# Patient Record
Sex: MALE | Race: WHITE | Employment: OTHER | URBAN - METROPOLITAN AREA
[De-identification: names, ages, dates, MRNs, and addresses within clinical notes are randomized per-mention and may not be internally consistent; named-entity substitution may affect disease eponyms.]

---

## 2017-03-23 PROBLEM — I47.29 NSVT (NONSUSTAINED VENTRICULAR TACHYCARDIA): Status: ACTIVE | Noted: 2017-03-23

## 2017-03-23 PROBLEM — Z86.718 HX OF DEEP VENOUS THROMBOSIS: Status: ACTIVE | Noted: 2017-03-23

## 2017-06-21 ENCOUNTER — TELEPHONE (OUTPATIENT)
Dept: INTERNAL MEDICINE | Age: 67
End: 2017-06-21

## 2021-04-10 ENCOUNTER — HOSPITAL ENCOUNTER (INPATIENT)
Age: 71
LOS: 9 days | Discharge: HOME | DRG: 190 | End: 2021-04-19
Attending: EMERGENCY MEDICINE | Admitting: HOSPITALIST
Payer: OTHER GOVERNMENT

## 2021-04-10 ENCOUNTER — APPOINTMENT (OUTPATIENT)
Dept: CT IMAGING | Age: 71
DRG: 190 | End: 2021-04-10
Payer: OTHER GOVERNMENT

## 2021-04-10 ENCOUNTER — APPOINTMENT (OUTPATIENT)
Dept: GENERAL RADIOLOGY | Age: 71
DRG: 190 | End: 2021-04-10
Payer: OTHER GOVERNMENT

## 2021-04-10 DIAGNOSIS — R06.02 SHORTNESS OF BREATH: Primary | ICD-10-CM

## 2021-04-10 PROBLEM — Z86.711 HISTORY OF PULMONARY EMBOLUS (PE): Status: ACTIVE | Noted: 2021-04-10

## 2021-04-10 PROBLEM — I50.9 DECOMPENSATED HEART FAILURE (HCC): Status: ACTIVE | Noted: 2021-04-10

## 2021-04-10 PROBLEM — J44.1 COPD EXACERBATION (HCC): Status: ACTIVE | Noted: 2021-04-10

## 2021-04-10 PROBLEM — R79.1 SUBTHERAPEUTIC INTERNATIONAL NORMALIZED RATIO (INR): Status: ACTIVE | Noted: 2021-04-10

## 2021-04-10 LAB
A/G RATIO: 1.1 (ref 1.1–2.2)
ALBUMIN SERPL-MCNC: 4 G/DL (ref 3.4–5)
ALP BLD-CCNC: 97 U/L (ref 40–129)
ALT SERPL-CCNC: 13 U/L (ref 10–40)
ANION GAP SERPL CALCULATED.3IONS-SCNC: 8 MMOL/L (ref 3–16)
APTT: 38.7 SEC (ref 24.2–36.2)
AST SERPL-CCNC: 13 U/L (ref 15–37)
BASOPHILS ABSOLUTE: 0.1 K/UL (ref 0–0.2)
BASOPHILS RELATIVE PERCENT: 0.7 %
BILIRUB SERPL-MCNC: 0.7 MG/DL (ref 0–1)
BUN BLDV-MCNC: 15 MG/DL (ref 7–20)
CALCIUM SERPL-MCNC: 9.7 MG/DL (ref 8.3–10.6)
CHLORIDE BLD-SCNC: 100 MMOL/L (ref 99–110)
CO2: 30 MMOL/L (ref 21–32)
CREAT SERPL-MCNC: 1.2 MG/DL (ref 0.8–1.3)
D DIMER: <200 NG/ML DDU (ref 0–229)
EKG ATRIAL RATE: 94 BPM
EKG DIAGNOSIS: NORMAL
EKG P AXIS: 85 DEGREES
EKG P-R INTERVAL: 130 MS
EKG Q-T INTERVAL: 408 MS
EKG QRS DURATION: 76 MS
EKG QTC CALCULATION (BAZETT): 510 MS
EKG R AXIS: 80 DEGREES
EKG T AXIS: 78 DEGREES
EKG VENTRICULAR RATE: 94 BPM
EOSINOPHILS ABSOLUTE: 0.4 K/UL (ref 0–0.6)
EOSINOPHILS RELATIVE PERCENT: 3.6 %
GFR AFRICAN AMERICAN: >60
GFR NON-AFRICAN AMERICAN: 60
GLOBULIN: 3.5 G/DL
GLUCOSE BLD-MCNC: 116 MG/DL (ref 70–99)
HCT VFR BLD CALC: 45.8 % (ref 40.5–52.5)
HEMOGLOBIN: 15.5 G/DL (ref 13.5–17.5)
INR BLD: 1.41 (ref 0.86–1.14)
LYMPHOCYTES ABSOLUTE: 1.4 K/UL (ref 1–5.1)
LYMPHOCYTES RELATIVE PERCENT: 14.1 %
MCH RBC QN AUTO: 32.9 PG (ref 26–34)
MCHC RBC AUTO-ENTMCNC: 33.8 G/DL (ref 31–36)
MCV RBC AUTO: 97.2 FL (ref 80–100)
MONOCYTES ABSOLUTE: 0.7 K/UL (ref 0–1.3)
MONOCYTES RELATIVE PERCENT: 7.4 %
NEUTROPHILS ABSOLUTE: 7.3 K/UL (ref 1.7–7.7)
NEUTROPHILS RELATIVE PERCENT: 74.2 %
PDW BLD-RTO: 13.2 % (ref 12.4–15.4)
PLATELET # BLD: 221 K/UL (ref 135–450)
PMV BLD AUTO: 8.8 FL (ref 5–10.5)
POTASSIUM REFLEX MAGNESIUM: 4.4 MMOL/L (ref 3.5–5.1)
PRO-BNP: 1062 PG/ML (ref 0–124)
PROTHROMBIN TIME: 16.4 SEC (ref 10–13.2)
RBC # BLD: 4.71 M/UL (ref 4.2–5.9)
SODIUM BLD-SCNC: 138 MMOL/L (ref 136–145)
TOTAL PROTEIN: 7.5 G/DL (ref 6.4–8.2)
TROPONIN: 0.02 NG/ML
TROPONIN: <0.01 NG/ML
WBC # BLD: 9.9 K/UL (ref 4–11)

## 2021-04-10 PROCEDURE — 96375 TX/PRO/DX INJ NEW DRUG ADDON: CPT

## 2021-04-10 PROCEDURE — 94761 N-INVAS EAR/PLS OXIMETRY MLT: CPT

## 2021-04-10 PROCEDURE — 85610 PROTHROMBIN TIME: CPT

## 2021-04-10 PROCEDURE — 6370000000 HC RX 637 (ALT 250 FOR IP): Performed by: HOSPITALIST

## 2021-04-10 PROCEDURE — 84443 ASSAY THYROID STIM HORMONE: CPT

## 2021-04-10 PROCEDURE — 94640 AIRWAY INHALATION TREATMENT: CPT

## 2021-04-10 PROCEDURE — 85025 COMPLETE CBC W/AUTO DIFF WBC: CPT

## 2021-04-10 PROCEDURE — 1200000000 HC SEMI PRIVATE

## 2021-04-10 PROCEDURE — 85379 FIBRIN DEGRADATION QUANT: CPT

## 2021-04-10 PROCEDURE — 96374 THER/PROPH/DIAG INJ IV PUSH: CPT

## 2021-04-10 PROCEDURE — 93005 ELECTROCARDIOGRAM TRACING: CPT | Performed by: EMERGENCY MEDICINE

## 2021-04-10 PROCEDURE — 94150 VITAL CAPACITY TEST: CPT

## 2021-04-10 PROCEDURE — 2700000000 HC OXYGEN THERAPY PER DAY

## 2021-04-10 PROCEDURE — 6370000000 HC RX 637 (ALT 250 FOR IP): Performed by: STUDENT IN AN ORGANIZED HEALTH CARE EDUCATION/TRAINING PROGRAM

## 2021-04-10 PROCEDURE — 85730 THROMBOPLASTIN TIME PARTIAL: CPT

## 2021-04-10 PROCEDURE — 84484 ASSAY OF TROPONIN QUANT: CPT

## 2021-04-10 PROCEDURE — 6360000002 HC RX W HCPCS: Performed by: STUDENT IN AN ORGANIZED HEALTH CARE EDUCATION/TRAINING PROGRAM

## 2021-04-10 PROCEDURE — 71260 CT THORAX DX C+: CPT

## 2021-04-10 PROCEDURE — 6360000004 HC RX CONTRAST MEDICATION: Performed by: HOSPITALIST

## 2021-04-10 PROCEDURE — 6360000002 HC RX W HCPCS: Performed by: HOSPITALIST

## 2021-04-10 PROCEDURE — 36415 COLL VENOUS BLD VENIPUNCTURE: CPT

## 2021-04-10 PROCEDURE — 93010 ELECTROCARDIOGRAM REPORT: CPT | Performed by: INTERNAL MEDICINE

## 2021-04-10 PROCEDURE — 71045 X-RAY EXAM CHEST 1 VIEW: CPT

## 2021-04-10 PROCEDURE — 2580000003 HC RX 258: Performed by: HOSPITALIST

## 2021-04-10 PROCEDURE — 99285 EMERGENCY DEPT VISIT HI MDM: CPT

## 2021-04-10 PROCEDURE — 84439 ASSAY OF FREE THYROXINE: CPT

## 2021-04-10 PROCEDURE — 80053 COMPREHEN METABOLIC PANEL: CPT

## 2021-04-10 PROCEDURE — 83880 ASSAY OF NATRIURETIC PEPTIDE: CPT

## 2021-04-10 RX ORDER — IPRATROPIUM BROMIDE AND ALBUTEROL SULFATE 2.5; .5 MG/3ML; MG/3ML
1 SOLUTION RESPIRATORY (INHALATION)
Status: DISCONTINUED | OUTPATIENT
Start: 2021-04-10 | End: 2021-04-10

## 2021-04-10 RX ORDER — MAGNESIUM SULFATE IN WATER 40 MG/ML
2000 INJECTION, SOLUTION INTRAVENOUS PRN
Status: DISCONTINUED | OUTPATIENT
Start: 2021-04-10 | End: 2021-04-19 | Stop reason: HOSPADM

## 2021-04-10 RX ORDER — POTASSIUM CHLORIDE 7.45 MG/ML
10 INJECTION INTRAVENOUS PRN
Status: DISCONTINUED | OUTPATIENT
Start: 2021-04-10 | End: 2021-04-19 | Stop reason: HOSPADM

## 2021-04-10 RX ORDER — METHYLPREDNISOLONE SODIUM SUCCINATE 125 MG/2ML
60 INJECTION, POWDER, LYOPHILIZED, FOR SOLUTION INTRAMUSCULAR; INTRAVENOUS DAILY
Status: DISCONTINUED | OUTPATIENT
Start: 2021-04-10 | End: 2021-04-10

## 2021-04-10 RX ORDER — FUROSEMIDE 10 MG/ML
40 INJECTION INTRAMUSCULAR; INTRAVENOUS ONCE
Status: COMPLETED | OUTPATIENT
Start: 2021-04-10 | End: 2021-04-10

## 2021-04-10 RX ORDER — FUROSEMIDE 10 MG/ML
40 INJECTION INTRAMUSCULAR; INTRAVENOUS 2 TIMES DAILY
Status: DISCONTINUED | OUTPATIENT
Start: 2021-04-11 | End: 2021-04-10

## 2021-04-10 RX ORDER — ATORVASTATIN CALCIUM 80 MG/1
80 TABLET, FILM COATED ORAL NIGHTLY
Status: DISCONTINUED | OUTPATIENT
Start: 2021-04-10 | End: 2021-04-19 | Stop reason: HOSPADM

## 2021-04-10 RX ORDER — ONDANSETRON 2 MG/ML
4 INJECTION INTRAMUSCULAR; INTRAVENOUS EVERY 6 HOURS PRN
Status: DISCONTINUED | OUTPATIENT
Start: 2021-04-10 | End: 2021-04-10

## 2021-04-10 RX ORDER — METHYLPREDNISOLONE SODIUM SUCCINATE 125 MG/2ML
60 INJECTION, POWDER, LYOPHILIZED, FOR SOLUTION INTRAMUSCULAR; INTRAVENOUS EVERY 12 HOURS
Status: DISCONTINUED | OUTPATIENT
Start: 2021-04-10 | End: 2021-04-16

## 2021-04-10 RX ORDER — POTASSIUM CHLORIDE 20 MEQ/1
40 TABLET, EXTENDED RELEASE ORAL PRN
Status: DISCONTINUED | OUTPATIENT
Start: 2021-04-10 | End: 2021-04-19 | Stop reason: HOSPADM

## 2021-04-10 RX ORDER — ACETAMINOPHEN 325 MG/1
650 TABLET ORAL EVERY 6 HOURS PRN
Status: DISCONTINUED | OUTPATIENT
Start: 2021-04-10 | End: 2021-04-19 | Stop reason: HOSPADM

## 2021-04-10 RX ORDER — ATORVASTATIN CALCIUM 80 MG/1
80 TABLET, FILM COATED ORAL NIGHTLY
COMMUNITY

## 2021-04-10 RX ORDER — ACETAMINOPHEN 650 MG/1
650 SUPPOSITORY RECTAL EVERY 6 HOURS PRN
Status: DISCONTINUED | OUTPATIENT
Start: 2021-04-10 | End: 2021-04-19 | Stop reason: HOSPADM

## 2021-04-10 RX ORDER — IPRATROPIUM BROMIDE AND ALBUTEROL SULFATE 2.5; .5 MG/3ML; MG/3ML
1 SOLUTION RESPIRATORY (INHALATION) EVERY 4 HOURS
Status: DISCONTINUED | OUTPATIENT
Start: 2021-04-10 | End: 2021-04-10

## 2021-04-10 RX ORDER — PROMETHAZINE HYDROCHLORIDE 25 MG/1
12.5 TABLET ORAL EVERY 6 HOURS PRN
Status: DISCONTINUED | OUTPATIENT
Start: 2021-04-10 | End: 2021-04-19 | Stop reason: HOSPADM

## 2021-04-10 RX ORDER — IPRATROPIUM BROMIDE AND ALBUTEROL SULFATE 2.5; .5 MG/3ML; MG/3ML
1 SOLUTION RESPIRATORY (INHALATION) EVERY 4 HOURS PRN
Status: DISCONTINUED | OUTPATIENT
Start: 2021-04-10 | End: 2021-04-19 | Stop reason: HOSPADM

## 2021-04-10 RX ORDER — FUROSEMIDE 10 MG/ML
20 INJECTION INTRAMUSCULAR; INTRAVENOUS 2 TIMES DAILY
Status: DISCONTINUED | OUTPATIENT
Start: 2021-04-11 | End: 2021-04-11

## 2021-04-10 RX ORDER — SODIUM CHLORIDE 0.9 % (FLUSH) 0.9 %
10 SYRINGE (ML) INJECTION PRN
Status: DISCONTINUED | OUTPATIENT
Start: 2021-04-10 | End: 2021-04-19 | Stop reason: HOSPADM

## 2021-04-10 RX ORDER — ASPIRIN 325 MG
325 TABLET ORAL ONCE
Status: COMPLETED | OUTPATIENT
Start: 2021-04-10 | End: 2021-04-10

## 2021-04-10 RX ORDER — SODIUM CHLORIDE 0.9 % (FLUSH) 0.9 %
10 SYRINGE (ML) INJECTION EVERY 12 HOURS SCHEDULED
Status: DISCONTINUED | OUTPATIENT
Start: 2021-04-10 | End: 2021-04-19 | Stop reason: HOSPADM

## 2021-04-10 RX ORDER — PANTOPRAZOLE SODIUM 40 MG/1
40 TABLET, DELAYED RELEASE ORAL
Status: DISCONTINUED | OUTPATIENT
Start: 2021-04-11 | End: 2021-04-19 | Stop reason: HOSPADM

## 2021-04-10 RX ORDER — SENNA PLUS 8.6 MG/1
1 TABLET ORAL DAILY PRN
Status: DISCONTINUED | OUTPATIENT
Start: 2021-04-10 | End: 2021-04-19 | Stop reason: HOSPADM

## 2021-04-10 RX ORDER — LOSARTAN POTASSIUM 25 MG/1
25 TABLET ORAL DAILY
Status: DISCONTINUED | OUTPATIENT
Start: 2021-04-10 | End: 2021-04-12

## 2021-04-10 RX ORDER — IPRATROPIUM BROMIDE AND ALBUTEROL SULFATE 2.5; .5 MG/3ML; MG/3ML
1 SOLUTION RESPIRATORY (INHALATION) EVERY 4 HOURS
Status: DISCONTINUED | OUTPATIENT
Start: 2021-04-11 | End: 2021-04-13

## 2021-04-10 RX ORDER — SODIUM CHLORIDE 9 MG/ML
25 INJECTION, SOLUTION INTRAVENOUS PRN
Status: DISCONTINUED | OUTPATIENT
Start: 2021-04-10 | End: 2021-04-19 | Stop reason: HOSPADM

## 2021-04-10 RX ADMIN — ATORVASTATIN CALCIUM 80 MG: 80 TABLET, FILM COATED ORAL at 22:14

## 2021-04-10 RX ADMIN — IPRATROPIUM BROMIDE AND ALBUTEROL SULFATE 1 AMPULE: .5; 3 SOLUTION RESPIRATORY (INHALATION) at 14:51

## 2021-04-10 RX ADMIN — LOSARTAN POTASSIUM 25 MG: 25 TABLET, FILM COATED ORAL at 19:03

## 2021-04-10 RX ADMIN — IPRATROPIUM BROMIDE AND ALBUTEROL SULFATE 1 AMPULE: .5; 3 SOLUTION RESPIRATORY (INHALATION) at 19:52

## 2021-04-10 RX ADMIN — NITROGLYCERIN 1 INCH: 20 OINTMENT TOPICAL at 22:14

## 2021-04-10 RX ADMIN — METOPROLOL TARTRATE 12.5 MG: 25 TABLET, FILM COATED ORAL at 22:15

## 2021-04-10 RX ADMIN — FUROSEMIDE 40 MG: 10 INJECTION, SOLUTION INTRAMUSCULAR; INTRAVENOUS at 17:07

## 2021-04-10 RX ADMIN — IPRATROPIUM BROMIDE AND ALBUTEROL SULFATE 1 AMPULE: .5; 2.5 SOLUTION RESPIRATORY (INHALATION) at 23:45

## 2021-04-10 RX ADMIN — METHYLPREDNISOLONE SODIUM SUCCINATE 60 MG: 125 INJECTION, POWDER, FOR SOLUTION INTRAMUSCULAR; INTRAVENOUS at 22:12

## 2021-04-10 RX ADMIN — METHYLPREDNISOLONE SODIUM SUCCINATE 60 MG: 125 INJECTION, POWDER, FOR SOLUTION INTRAMUSCULAR; INTRAVENOUS at 15:20

## 2021-04-10 RX ADMIN — ENOXAPARIN SODIUM 90 MG: 100 INJECTION SUBCUTANEOUS at 19:03

## 2021-04-10 RX ADMIN — Medication 10 ML: at 22:14

## 2021-04-10 RX ADMIN — IOPAMIDOL 75 ML: 755 INJECTION, SOLUTION INTRAVENOUS at 17:55

## 2021-04-10 RX ADMIN — ASPIRIN 325 MG: 325 TABLET, FILM COATED ORAL at 15:20

## 2021-04-10 NOTE — ED NOTES
Bed: 14  Expected date:   Expected time:   Means of arrival:   Comments:  [address], RN  04/10/21 1369

## 2021-04-10 NOTE — ED PROVIDER NOTES
Emergency Department Provider Note     Location: [facility]  ED  4/10/2021    [facility] All diagnostic, treatment, and disposition decisions were made by myself in conjunction with the resident. Briefly, this is a 72 y.o. male here for shortness of breath. Patient reports that he got his second dose of Covid vaccine on Sunday. He reports that on Monday he woke up feeling very short of breath. Initially he thought it would just get better and he used his albuterol inhaler more often. Unfortunately symptoms progressed and he felt worse especially on exertion and at nighttime trying to sleep. Today it got so bad he called EMS. Patient reports that he has a history of coronary artery disease but does not have a stent. Reports history of atrial fibrillation and is on anticoagulation. ED Triage Vitals [04/10/21 1358]   BP Temp Temp Source Pulse Resp SpO2 Height Weight   (!) 156/88 98.1 °F (36.7 °C) Oral 96 20 94 % 6' 5\" (1.956 m) 198 lb (89.8 kg)        Patient resting comfortably in no acute distress. Heart is regular rate and rhythm. Lungs with diffuse wheezes throughout with increased work of breathing and crackles at the bases. Abdomen is soft, nondistended, and nontender. No peripheral edema noted. Patient seen and examined. Vital signs stable and within normal limits. Physical exam as documented above. EKG shows concerning ST depressions in leads II, 3, aVF, V4, V5, V6. Depressions in V4, V5, V6 appear new compared to prior EKG dated March 23, 2017. Lab work-up notable for elevated troponin of 0.02, BNP elevated at 1062. Patient given aspirin, DuoNeb treatment, steroids, and Lasix. On reevaluation patient is still symptomatic with wheezes and crackles. Will plan to admit for abnormal EKG, elevated troponin, heart failure exacerbation and COPD exacerbation.     EKG  The Ekg interpreted by me in the absence of a cardiologist shows. EKG shows concerning ST depressions in leads II, 3, aVF, V4, V5, V6. Depressions in V4, V5, V6 appear new compared to prior EKG dated March 23, 2017      Xr Chest Portable    Result Date: 4/10/2021  EXAMINATION: ONE XRAY VIEW OF THE CHEST 4/10/2021 2:05 pm COMPARISON: 03/23/2017 HISTORY: ORDERING SYSTEM PROVIDED HISTORY: shortness of breath TECHNOLOGIST PROVIDED HISTORY: Reason for exam:->shortness of breath Reason for Exam: SOB Acuity: Acute Type of Exam: Initial FINDINGS: There is biapical and bibasilar scarring. The lungs are hyperexpanded. The cardiac silhouette is within normal limits. There is no pneumothorax or pleural effusion. 1.  No acute abnormality.          Results for orders placed or performed during the hospital encounter of 04/10/21   CBC auto differential   Result Value Ref Range    WBC 9.9 4.0 - 11.0 K/uL    RBC 4.71 4.20 - 5.90 M/uL    Hemoglobin 15.5 13.5 - 17.5 g/dL    Hematocrit 45.8 40.5 - 52.5 %    MCV 97.2 80.0 - 100.0 fL    MCH 32.9 26.0 - 34.0 pg    MCHC 33.8 31.0 - 36.0 g/dL    RDW 13.2 12.4 - 15.4 %    Platelets 640 597 - 678 K/uL    MPV 8.8 5.0 - 10.5 fL    Neutrophils % 74.2 %    Lymphocytes % 14.1 %    Monocytes % 7.4 %    Eosinophils % 3.6 %    Basophils % 0.7 %    Neutrophils Absolute 7.3 1.7 - 7.7 K/uL    Lymphocytes Absolute 1.4 1.0 - 5.1 K/uL    Monocytes Absolute 0.7 0.0 - 1.3 K/uL    Eosinophils Absolute 0.4 0.0 - 0.6 K/uL    Basophils Absolute 0.1 0.0 - 0.2 K/uL   Comprehensive Metabolic Panel w/ Reflex to MG   Result Value Ref Range    Sodium 138 136 - 145 mmol/L    Potassium reflex Magnesium 4.4 3.5 - 5.1 mmol/L    Chloride 100 99 - 110 mmol/L    CO2 30 21 - 32 mmol/L    Anion Gap 8 3 - 16    Glucose 116 (H) 70 - 99 mg/dL    BUN 15 7 - 20 mg/dL    CREATININE 1.2 0.8 - 1.3 mg/dL    GFR Non-African American 60 (A) >60    GFR African American >60 >60    Calcium 9.7 8.3 - 10.6 mg/dL    Total Protein 7.5 6.4 - 8.2 g/dL    Albumin 4.0 3.4 - 5.0 g/dL Albumin/Globulin Ratio 1.1 1.1 - 2.2    Total Bilirubin 0.7 0.0 - 1.0 mg/dL    Alkaline Phosphatase 97 40 - 129 U/L    ALT 13 10 - 40 U/L    AST 13 (L) 15 - 37 U/L    Globulin 3.5 g/dL   Troponin   Result Value Ref Range    Troponin 0.02 (H) <0.01 ng/mL   Brain Natriuretic Peptide   Result Value Ref Range    Pro-BNP 1,062 (H) 0 - 124 pg/mL   EKG 12 Lead   Result Value Ref Range    Ventricular Rate 94 BPM    Atrial Rate 94 BPM    P-R Interval 130 ms    QRS Duration 76 ms    Q-T Interval 408 ms    QTc Calculation (Bazett) 510 ms    P Axis 85 degrees    R Axis 80 degrees    T Axis 78 degrees    Diagnosis       Sinus rhythm with occasional Premature ventricular complexes and Fusion complexesST & T wave abnormality, consider inferior ischemiaProlonged QTAbnormal ECGWhen compared with ECG of 23-MAR-2017 12:58,Significant changes have occurred       For further details of [facility] emergency department encounter, please see Dr. Kristie Padilla's documentation. Total critical care time is 0 minutes, which excludes separately billable procedures and updating family. Time spent is specifically for management of the presenting complaint and symptoms initially, direct bedside care, reevaluation, review of records, and consultation. There was a high probability of clinically significant life-threatening deterioration in the patient's condition, which required my urgent intervention. This chart was generated in part by using Dragon Dictation system and may contain errors related to that system including errors in grammar, punctuation, and spelling, as well as words and phrases that may be inappropriate. If there are any questions or concerns please feel free to contact the dictating provider for clarification.           Khari Hernandez MD  04/10/21 1252

## 2021-04-10 NOTE — ED PROVIDER NOTES
Pt seen and evaluated under supervision from Dr. Patti Carr of Breath (pt to ED with c/o SOB since monday. pt got second dose of mederna vaccine on sunday. hx of COPD. no oxygen use at home. given albuterol treatment by EMS. reports some relief. EMS found pt 89% on RA at home)      [address] [employer] [employer]  Ernestina Norris is a 72 y.o. male with a history of A. fib, COPD, CAD,  who presents to the ED complaining of worsening shortness of breath. Patient states that he has been having shortness of breath for the last week. He reports worsening shortness of breath,wheezing and productive cough. He states that he has been using his rescue inhaler inhaler more than usual.  Reports that he did stop smoking 2 weeks ago. He is compliant with COPD medications. He received his second Covid shot on Sunday. Denies any chest pain, fever, chills, or night sweats. He is a patient of the [place] and states he had a stress test 6 months ago with no abnormal results. No other complaints, modifying factors or associated symptoms. I have reviewed the following from the nursing documentation.     Past Medical History:   Diagnosis Date    Abdominal Pain     Anxiety State     BPH (benign prostatic hyperplasia) 10/3/2013    Cancer ([facility] [address] 2014    prostate    Chest pain     Colon polyp 3/27/2012    DVT, lower extremity (HCC)     Left Leg    Hernia 1/24/2014    HYPERTENSION     PE (pulmonary embolism) 2/7/2014    Pt denies    Prostatitis, Hx of     SBO (small bowel obstruction) ([facility] [address] 1/24/2014     Past Surgical History:   Procedure Laterality Date    HERNIA REPAIR      INGUINAL HERNIA REPAIR Right 1/23/14    incarcerated right inguinal hernia repair    OTHER SURGICAL HISTORY  1/16/2014    ROBOTIC ASSISTED LAPAROSCOPIC PROSTATECTOMY    PROSTATE BIOPSY      TONSILLECTOMY       Family History   Problem Relation Age of Onset    Cancer Mother     Cancer Father     Cancer Sister    Gagan Martin Refill    predniSONE (DELTASONE) 20 MG tablet Take 2 tabs (40 mg) daily for 5 days then 1.5 tabs (30 mg) daily for 3 days then 1 tab (20 mg) daily for 3 days then 0.5 tabs (10 mg ) daily for 3 days then STOP 21 tablet 0    albuterol sulfate HFA (VENTOLIN HFA) 108 (90 BASE) MCG/ACT inhaler Inhale 2 puffs into the lungs every 6 hours as needed for Wheezing 1 Inhaler 3    warfarin (COUMADIN) 5 MG tablet Take 2 tablets by mouth daily. 100 tablet 3    Compression Stockings MISC by Does not apply route. 1 each 0    Compression Stockings MISC by Does not apply route. 2 each 0    sildenafil (VIAGRA) 100 MG tablet Take one tablet by mouth one hour prior to sexual activity. 18 tablet 3     No Known Allergies    REVIEW OF SYSTEMS  10 systems reviewed, pertinent positives per HPI otherwise noted to be negative. PHYSICAL EXAM  BP (!) 156/88   Pulse 96   Temp 98.1 °F (36.7 °C) (Oral)   Resp 20   Ht 6' 5\" (1.956 m)   Wt 198 lb (89.8 kg)   SpO2 94%   BMI 23.48 kg/m²    Physical Exam  Constitutional:       Appearance: He is well-developed. HENT:      Head: Normocephalic and atraumatic. Eyes:      Pupils: Pupils are equal, round, and reactive to light. Cardiovascular:      Rate and Rhythm: Normal rate and regular rhythm. Pulmonary:      Effort: Pulmonary effort is normal.      Breath sounds: Wheezing and rhonchi present. Chest:      Chest wall: Tenderness present. Neurological:      General: No focal deficit present. Mental Status: He is alert and oriented to person, place, and time. Psychiatric:         Mood and Affect: Mood normal.         Behavior: Behavior normal.       GENERAL APPEARANCE: Awake and alert. Cooperative. No acute distress. HEAD: Normocephalic. Atraumatic. EYES: PERRL. EOM's grossly intact. ENT: Mucous membranes are moist.   NECK: Supple, trachea midline. HEART: RRR. Normal S1S2, no rubs, gallops, or murmurs noted  LUNGS: Respirations unlabored. CTAB. Good air exchange. No wheezes, rales, or rhonchi. Speaking comfortably in full sentences. ABDOMEN: Soft. Non-distended. Non-tender. No guarding or rebound. Normal bowel sounds. EXTREMITIES: No peripheral edema. MAEE. No acute deformities. SKIN: Warm and dry. No acute rashes. NEUROLOGICAL: Alert and oriented X 3. CN II-XII intact. No gross facial drooping. Strength 5/5, sensation intact. Normal coordination. No pronator drift. Gait normal.   PSYCHIATRIC: Normal mood and affect. LABS  I have reviewed all labs for this visit. Results for orders placed or performed during the hospital encounter of 04/10/21   EKG 12 Lead   Result Value Ref Range    Ventricular Rate 94 BPM    Atrial Rate 94 BPM    P-R Interval 130 ms    QRS Duration 76 ms    Q-T Interval 408 ms    QTc Calculation (Bazett) 510 ms    P Axis 85 degrees    R Axis 80 degrees    T Axis 78 degrees    Diagnosis       Sinus rhythm with occasional Premature ventricular complexes and Fusion complexesST & T wave abnormality, consider inferior ischemiaProlonged QTAbnormal ECGWhen compared with ECG of 23-MAR-2017 12:58,Significant changes have occurred         RADIOLOGY  X-RAYS:  I have reviewed radiologic plain film image(s). ALL OTHER NON-PLAIN FILM IMAGES SUCH AS CT, ULTRASOUND AND MRI HAVE BEEN READ BY THE RADIOLOGIST. CT CHEST PULMONARY EMBOLISM W CONTRAST   Final Result   1. No evidence of pulmonary embolic disease. 2. No acute pulmonary findings. Pulmonary emphysema. 3. Stable mass in the thoracic inlet on the right posterior to the thyroid   gland. 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         XR CHEST PORTABLE   Final Result   1. No acute abnormality. NM Cardiac Stress Test Nuclear Imaging    (Results Pending)   CT CARDIAC CALCIUM SCORING    (Results Pending)              Rechecks: Physical assessment performed.   Patient resting comfortably in bed states breathing has improved with ravinder frey. Interpreted by Resident physician    Rhythm: normal sinus   Rate: normal  Axis: normal  Ectopy: premature ventricular contractions (unifocal)  Conduction: normal  ST Segments: depression in  v4, v5 and v6  T Waves: normal  Q Waves: nonspecific    Clinical Impression: non-specific EKG      ED COURSE/MDM      HEART SCORE:    History: +1 for moderate suspicion  EKG: +2 for significant ST depression   Age: +4 for age >65 years  Risk factors (includes HLD, HTN, DM, tobacco use, obesity, and +FHx): +2 for known CAD or 3+ risk factors  Initial troponin: +1 for troponin 1-3x normal limit    Heart score: 7. This falls under the following category: Score of 7-10, which indicates a high risk of major adverse cardiac event, and supports admission with cardiology consultation, early aggressive treatment, and consideration of catheterization without preceding non-invasive testing    Patient seen and evaluated. Old records reviewed. Labs and imaging reviewed and results discussed with patient. Patient had an elevated troponin of 0.02 and a BNP of 1062. Patient was given DuoNeb and Solu-Medrol in the ED with good symptomatic relief of his wheezing. I spoke with Dr. Isa Lester. We thoroughly discussed the history, physical exam, laboratory and imaging studies, as well as, emergency department course. Based upon that discussion, we've decided to admit Yogesh Galindo for further observation and evaluation of Chinyere Heal chest pain. As I have deemed necessary from their history, physical, and studies, I have considered and evaluated Yogesh Galindo for the following diagnoses:  PERICARDIAL TAMPONADE, PNEUMOTHORAX, PULMONARY EMBOLISM, and THORACIC DISSECTION. New Prescriptions    No medications on file       CLINICAL IMPRESSION  1. Shortness of breath        Blood pressure (!) 156/88, pulse 96, temperature 98.1 °F (36.7 °C), temperature source Oral, resp.  rate 20, height 6' 5\" (1.956 m), weight 198 lb (89.8 kg), SpO2 94 %. DISPOSITION  Raj Aase was admitted in stable condition.       Radha Irving DO  Resident  04/12/21 6437

## 2021-04-11 PROBLEM — I48.0 PAF (PAROXYSMAL ATRIAL FIBRILLATION) (HCC): Status: ACTIVE | Noted: 2021-04-11

## 2021-04-11 LAB
ANION GAP SERPL CALCULATED.3IONS-SCNC: 8 MMOL/L (ref 3–16)
BASOPHILS ABSOLUTE: 0.1 K/UL (ref 0–0.2)
BASOPHILS RELATIVE PERCENT: 0.6 %
BUN BLDV-MCNC: 25 MG/DL (ref 7–20)
CALCIUM SERPL-MCNC: 9.5 MG/DL (ref 8.3–10.6)
CHLORIDE BLD-SCNC: 99 MMOL/L (ref 99–110)
CHOLESTEROL, TOTAL: 150 MG/DL (ref 0–199)
CO2: 28 MMOL/L (ref 21–32)
CREAT SERPL-MCNC: 1.7 MG/DL (ref 0.8–1.3)
EKG ATRIAL RATE: 75 BPM
EKG DIAGNOSIS: NORMAL
EKG P AXIS: 83 DEGREES
EKG P-R INTERVAL: 128 MS
EKG Q-T INTERVAL: 436 MS
EKG QRS DURATION: 86 MS
EKG QTC CALCULATION (BAZETT): 486 MS
EKG R AXIS: 73 DEGREES
EKG T AXIS: 78 DEGREES
EKG VENTRICULAR RATE: 75 BPM
EOSINOPHILS ABSOLUTE: 0 K/UL (ref 0–0.6)
EOSINOPHILS RELATIVE PERCENT: 0 %
GFR AFRICAN AMERICAN: 48
GFR NON-AFRICAN AMERICAN: 40
GLUCOSE BLD-MCNC: 140 MG/DL (ref 70–99)
HCT VFR BLD CALC: 42.9 % (ref 40.5–52.5)
HDLC SERPL-MCNC: 50 MG/DL (ref 40–60)
HEMOGLOBIN: 14.3 G/DL (ref 13.5–17.5)
LDL CHOLESTEROL CALCULATED: 87 MG/DL
LYMPHOCYTES ABSOLUTE: 1 K/UL (ref 1–5.1)
LYMPHOCYTES RELATIVE PERCENT: 7.4 %
MAGNESIUM: 2 MG/DL (ref 1.8–2.4)
MCH RBC QN AUTO: 32.3 PG (ref 26–34)
MCHC RBC AUTO-ENTMCNC: 33.3 G/DL (ref 31–36)
MCV RBC AUTO: 96.9 FL (ref 80–100)
MONOCYTES ABSOLUTE: 0.4 K/UL (ref 0–1.3)
MONOCYTES RELATIVE PERCENT: 2.9 %
NEUTROPHILS ABSOLUTE: 11.7 K/UL (ref 1.7–7.7)
NEUTROPHILS RELATIVE PERCENT: 89.1 %
PDW BLD-RTO: 13.5 % (ref 12.4–15.4)
PLATELET # BLD: 229 K/UL (ref 135–450)
PMV BLD AUTO: 8.8 FL (ref 5–10.5)
POTASSIUM SERPL-SCNC: 4.4 MMOL/L (ref 3.5–5.1)
RBC # BLD: 4.43 M/UL (ref 4.2–5.9)
SODIUM BLD-SCNC: 135 MMOL/L (ref 136–145)
T4 FREE: 1.5 NG/DL (ref 0.9–1.8)
TRIGL SERPL-MCNC: 65 MG/DL (ref 0–150)
TSH SERPL DL<=0.05 MIU/L-ACNC: 1.24 UIU/ML (ref 0.27–4.2)
VLDLC SERPL CALC-MCNC: 13 MG/DL
WBC # BLD: 13.1 K/UL (ref 4–11)

## 2021-04-11 PROCEDURE — 1200000000 HC SEMI PRIVATE

## 2021-04-11 PROCEDURE — 6360000002 HC RX W HCPCS: Performed by: HOSPITALIST

## 2021-04-11 PROCEDURE — 97535 SELF CARE MNGMENT TRAINING: CPT

## 2021-04-11 PROCEDURE — 97166 OT EVAL MOD COMPLEX 45 MIN: CPT

## 2021-04-11 PROCEDURE — 83735 ASSAY OF MAGNESIUM: CPT

## 2021-04-11 PROCEDURE — 85025 COMPLETE CBC W/AUTO DIFF WBC: CPT

## 2021-04-11 PROCEDURE — 6370000000 HC RX 637 (ALT 250 FOR IP): Performed by: HOSPITALIST

## 2021-04-11 PROCEDURE — 36415 COLL VENOUS BLD VENIPUNCTURE: CPT

## 2021-04-11 PROCEDURE — 80048 BASIC METABOLIC PNL TOTAL CA: CPT

## 2021-04-11 PROCEDURE — 2580000003 HC RX 258: Performed by: HOSPITALIST

## 2021-04-11 PROCEDURE — 6370000000 HC RX 637 (ALT 250 FOR IP): Performed by: INTERNAL MEDICINE

## 2021-04-11 PROCEDURE — 97162 PT EVAL MOD COMPLEX 30 MIN: CPT

## 2021-04-11 PROCEDURE — 93005 ELECTROCARDIOGRAM TRACING: CPT | Performed by: HOSPITALIST

## 2021-04-11 PROCEDURE — 93010 ELECTROCARDIOGRAM REPORT: CPT | Performed by: INTERNAL MEDICINE

## 2021-04-11 PROCEDURE — 2700000000 HC OXYGEN THERAPY PER DAY

## 2021-04-11 PROCEDURE — 97530 THERAPEUTIC ACTIVITIES: CPT

## 2021-04-11 PROCEDURE — 99222 1ST HOSP IP/OBS MODERATE 55: CPT | Performed by: INTERNAL MEDICINE

## 2021-04-11 PROCEDURE — 94761 N-INVAS EAR/PLS OXIMETRY MLT: CPT

## 2021-04-11 PROCEDURE — 80061 LIPID PANEL: CPT

## 2021-04-11 PROCEDURE — 94640 AIRWAY INHALATION TREATMENT: CPT

## 2021-04-11 PROCEDURE — 97110 THERAPEUTIC EXERCISES: CPT

## 2021-04-11 RX ORDER — ASPIRIN 81 MG/1
81 TABLET ORAL DAILY
Status: DISCONTINUED | OUTPATIENT
Start: 2021-04-11 | End: 2021-04-19 | Stop reason: HOSPADM

## 2021-04-11 RX ADMIN — LOSARTAN POTASSIUM 25 MG: 25 TABLET, FILM COATED ORAL at 08:51

## 2021-04-11 RX ADMIN — METOPROLOL TARTRATE 12.5 MG: 25 TABLET, FILM COATED ORAL at 08:51

## 2021-04-11 RX ADMIN — METHYLPREDNISOLONE SODIUM SUCCINATE 60 MG: 125 INJECTION, POWDER, FOR SOLUTION INTRAMUSCULAR; INTRAVENOUS at 10:07

## 2021-04-11 RX ADMIN — IPRATROPIUM BROMIDE AND ALBUTEROL SULFATE 1 AMPULE: .5; 2.5 SOLUTION RESPIRATORY (INHALATION) at 08:16

## 2021-04-11 RX ADMIN — NITROGLYCERIN 1 INCH: 20 OINTMENT TOPICAL at 06:14

## 2021-04-11 RX ADMIN — FUROSEMIDE 20 MG: 10 INJECTION, SOLUTION INTRAMUSCULAR; INTRAVENOUS at 01:53

## 2021-04-11 RX ADMIN — RIVAROXABAN 20 MG: 20 TABLET, FILM COATED ORAL at 16:58

## 2021-04-11 RX ADMIN — ASPIRIN 81 MG: 81 TABLET, COATED ORAL at 08:51

## 2021-04-11 RX ADMIN — IPRATROPIUM BROMIDE AND ALBUTEROL SULFATE 1 AMPULE: .5; 2.5 SOLUTION RESPIRATORY (INHALATION) at 11:51

## 2021-04-11 RX ADMIN — ATORVASTATIN CALCIUM 80 MG: 80 TABLET, FILM COATED ORAL at 20:44

## 2021-04-11 RX ADMIN — METOPROLOL TARTRATE 12.5 MG: 25 TABLET, FILM COATED ORAL at 20:45

## 2021-04-11 RX ADMIN — PANTOPRAZOLE SODIUM 40 MG: 40 TABLET, DELAYED RELEASE ORAL at 08:51

## 2021-04-11 RX ADMIN — Medication 10 ML: at 10:07

## 2021-04-11 RX ADMIN — Medication 10 ML: at 20:45

## 2021-04-11 RX ADMIN — IPRATROPIUM BROMIDE AND ALBUTEROL SULFATE 1 AMPULE: .5; 2.5 SOLUTION RESPIRATORY (INHALATION) at 16:24

## 2021-04-11 RX ADMIN — IPRATROPIUM BROMIDE AND ALBUTEROL SULFATE 1 AMPULE: .5; 2.5 SOLUTION RESPIRATORY (INHALATION) at 19:35

## 2021-04-11 RX ADMIN — METHYLPREDNISOLONE SODIUM SUCCINATE 60 MG: 125 INJECTION, POWDER, FOR SOLUTION INTRAMUSCULAR; INTRAVENOUS at 20:44

## 2021-04-11 RX ADMIN — IPRATROPIUM BROMIDE AND ALBUTEROL SULFATE 1 AMPULE: .5; 2.5 SOLUTION RESPIRATORY (INHALATION) at 03:44

## 2021-04-11 ASSESSMENT — PAIN SCALES - GENERAL: PAINLEVEL_OUTOF10: 0

## 2021-04-11 NOTE — PROGRESS NOTES
Physical Therapy    Facility/Department: [facility] A2 CARD TELEMETRY  Initial Assessment    NAME: Raj Aase  : 1950  MRN: [id number]    Date of Service: 2021    Discharge Recommendations:  24 hour supervision or assist, Home with Home health PT   PT Equipment Recommendations  Equipment Needed: No    Assessment   Body structures, Functions, Activity limitations: Decreased functional mobility ; Decreased endurance;Decreased strength  Assessment: Pt is a 73 y/o male presenting with SOB associated with exacerbation of COPD and CHF. Pt is currently functioning below baseline level of I for all activities, requiring supervision for bed mobility and SBA-CGA for t/f and gait up to 10'. Pt is limited by decreased endurance associated with SOB and low BP and will benefit from PT tx to continue to progress strength and mobility as tolerated. Barriers to tx include decreased endurance, activity tolerance, and low BP. Recommending home d/c with initial 24 hour assist with [facility] therapy. Treatment Diagnosis: Decreased activity tolerance associated with SOB  Prognosis: Good  Decision Making: Medium Complexity  PT Education: Goals;PT Role;Energy Conservation;Home Exercise Program;General Safety  Patient Education: Pt was educated regarding PT role in tx, as well as monitoring for dizziness/SOB when changing positions. Pt voiced understanding and will continue to benefit from skilled education. Barriers to Learning: none  REQUIRES PT FOLLOW UP: Yes  Activity Tolerance  Activity Tolerance: Patient Tolerated treatment well;Patient limited by endurance  Activity Tolerance: Pt limited by unsteadiness associated with low BP and hunger. Patient Diagnosis(es): The encounter diagnosis was Shortness of breath.      has a past medical history of Abdominal Pain, Anxiety State, BPH (benign prostatic hyperplasia), Cancer ([address] Chest pain, Colon polyp, DVT, lower extremity ([facility] [address] Hernia, HYPERTENSION, PE (pulmonary embolism), Prostatitis, Hx of, and SBO (small bowel obstruction) ([facility] [address] has a past surgical history that includes hernia repair; Prostate biopsy; Tonsillectomy; other surgical history (1/16/2014); and Inguinal hernia repair (Right, 1/23/14). Restrictions  Restrictions/Precautions  Restrictions/Precautions: General Precautions  Position Activity Restriction  Other position/activity restrictions: up with assist, tele, 3LO2  Vision/Hearing  Vision: Impaired  Vision Exceptions: Wears glasses for reading  Hearing: Within functional limits     Subjective  General  Chart Reviewed: Yes  Patient assessed for rehabilitation services?: Yes  Additional Pertinent Hx: H/o COPD and CHF  Response To Previous Treatment: Not applicable  Family / Caregiver Present: No  Referring Practitioner: Dr. Terrence Hernandez  Referral Date : 04/11/21  Diagnosis: COPD exacerbation  Follows Commands: Within Functional Limits  Subjective  Subjective: Pt presents in supine for PT eval, denies any pain and was pleasant and agreeable to tx. Pt reports feeling very hungry at this time.   Pain Screening  Patient Currently in Pain: Denies  Vital Signs  BP: (!) 82/49  BP Location: Left Arm  Patient Position: Semi fowlers  Level of Consciousness: Alert (0)  Patient Currently in Pain: Denies  Oxygen Therapy  SpO2: 94 %  Pulse Oximeter Device Mode: Intermittent  Pulse Oximeter Device Location: Finger  O2 Device: Nasal cannula(3L)       Orientation  Orientation  Overall Orientation Status: Within Normal Limits  Social/Functional History  Social/Functional History  Lives With: Alone  Type of Home: Apartment  Home Layout: (3rd level)  Home Access: Stairs to enter with rails  Entrance Stairs - Number of Steps: 21 step to apartment, 3 step to enter building  Entrance Stairs - Rails: Both  Bathroom Shower/Tub: Tub/Shower unit  Bathroom Toilet: Standard  Bathroom Accessibility: Walker accessible  Receives Help From: Family(Daughter and son- PRN)  ADL Assistance: Independent  Homemaking Assistance: Independent  Ambulation Assistance: Independent  Transfer Assistance: Independent  Active : Yes  Mode of Transportation: Car  Occupation: Retired  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[employer], material management  Leisure & Hobbies: Reading    Objective     Observation/Palpation  Posture: Fair    AROM RLE (degrees)  RLE AROM: WNL  AROM LLE (degrees)  LLE AROM : WNL  AROM RUE (degrees)  RUE AROM : WNL  AROM LUE (degrees)  LUE AROM : WNL  Strength RLE  Strength RLE: Exception  R Hip Flexion: 5/5  R Hip ABduction: 5/5  R Hip ADduction: 5/5  R Knee Flexion: 4/5  R Knee Extension: 5/5  R Ankle Dorsiflexion: 5/5  R Ankle Plantar flexion: 5/5  Strength LLE  Strength LLE: Exception  L Hip Flexion: 5/5  L Hip ABduction: 5/5  L Hip ADduction: 5/5  L Knee Flexion: 4/5  L Knee Extension: 5/5  L Ankle Dorsiflexion: 5/5  L Ankle Plantar Flexion: 5/5        Bed mobility  Rolling to Right: Modified independent  Supine to Sit: Supervision  Sit to Supine: Unable to assess(Pt seated in recliner at end of eval)  Comment: Pt grossly Melisa for rolling to R with HOB elevated and use of BR. Supervision for supine to sit d/t low BP     Transfers  Sit to Stand: Stand by assistance;Contact guard assistance  Stand to sit: Contact guard assistance;Stand by assistance  Comment: Pt grossly SBA-CGA for sit <> stand d/t unsteadiness in standing associated with low BP. Completed 4x, no AD used     Ambulation  Ambulation?: Yes  Ambulation 1  Surface: level tile  Device: No Device  Assistance: Stand by assistance;Contact guard assistance  Quality of Gait: Unsteady on feet, decreased hip flexion, ipsilateral trunk sway with stepping  Gait Deviations: Increased HERBERT; Slow Yadi  Distance: 10'  Comments: Pt limited by low BP and decreased strength/activity tolerance.   Stairs/Curb  Stairs?: No     Balance  Sitting - Static: Good  Sitting - Dynamic: Good  Standing - Static: Fair  Standing - Dynamic: Fair Comments: Pt required CGA during standing balance d/t unsteadiness in order to check for [place]. Exercises  Hip Flexion: BLE x15  Hip Abduction: BLE x15  Knee Long Arc Quad: BLE x15  Ankle Pumps: BLE x15   Comments: Therapist cued pt for technique and provided feedback on performance during demonstration    Plan   Plan  Times per week: 3-5x/week  Times per day: Daily  Current Treatment Recommendations: Balance Training, Stair training, Functional Mobility Training, Home Exercise Program, Patient/Caregiver Education & Training, Strengthening, Endurance Training  Plan Comment: Progress functional mobility as tolerated  Safety Devices  Type of devices: All fall risk precautions in place, Call light within reach, Left in chair, Chair alarm in place, Nurse notified, Gait belt, Patient at risk for falls    AM-PAC Score  AM-PAC Inpatient Mobility Raw Score : 20 (04/11/21 1016)  AM-PAC Inpatient T-Scale Score : 47.67 (04/11/21 1016)  Mobility Inpatient CMS 0-100% Score: 35.83 (04/11/21 1016)  Mobility Inpatient CMS G-Code Modifier : Radha Aleman (04/11/21 1016)          Goals  Short term goals  Time Frame for Short term goals: 1 week (4/18) (unless otherwise specified)  Short term goal 1: Pt will perform bed mobility independently without SOB  Short term goal 2: Pt will perform t/f independently  Short term goal 3: Pt will ambulate 150' independently without SOB  Short term goal 4: Pt will climb 24 steps with SBA-supervision without exacerbation of SOB  Short term goal 5: 4/15 Pt will be independent in home exercise program  Patient Goals   Patient goals : \"To get better at breathing\"       Therapy Time   Individual Concurrent Group Co-treatment   Time In 0905         Time Out 0938         Minutes 33         Timed Code Treatment Minutes: 23 Minutes(10 min for eval)      If pt is unable to be seen after this session, please let this note serve as discharge summary. Please see case management note for discharge disposition.   Thank

## 2021-04-11 NOTE — H&P
orthopnea  Gastrointestinal: Negative for N/V/D; no hematemesis, hematochezia, or melena; no anorexia  Genitourinary: Negative for dysuria, frequency, hesitancy, and urgency; no incontinence  Hematologic/Lymphatic: Negative for bleeding tendency, excessive bruising, and enlarged LN  Musculoskeletal: Positive for myalgias and arthalgias; able to ambulate without difficulty  Neurologic: Negative for LOC, seizure activity, paresthesias, dysarthria, vertigo, and gait disturbance  Skin: Negative for itching,rash  Psychiatric: Positive for depression,anxiety  Endocrine: Negative for polyuria/polydipsia/polyphagia; no heat/cold intolerance    Past Medical History:   has a past medical history of Abdominal Pain, Anxiety State, BPH (benign prostatic hyperplasia), Cancer ([facility] [address] Chest pain, Colon polyp, DVT, lower extremity ([facility] [address] Hernia, HYPERTENSION, PE (pulmonary embolism), Prostatitis, Hx of, and SBO (small bowel obstruction) ([facility] [address] Past Surgical History:   has a past surgical history that includes hernia repair; Prostate biopsy; Tonsillectomy; other surgical history (1/16/2014); and Inguinal hernia repair (Right, 1/23/14). Medications:  No current facility-administered medications on file prior to encounter. Current Outpatient Medications on File Prior to Encounter   Medication Sig Dispense Refill    albuterol sulfate (PROAIR RESPICLICK) 027 (90 Base) MCG/ACT aerosol powder inhalation Inhale 2 puffs into the lungs every 4 hours as needed for Wheezing or Shortness of Breath      rivaroxaban (XARELTO) 20 MG TABS tablet Take 20 mg by mouth Daily with supper      metoprolol tartrate (LOPRESSOR) 25 MG tablet Take 12.5 mg by mouth 2 times daily Take 12.5 mg BID      atorvastatin (LIPITOR) 80 MG tablet Take 80 mg by mouth nightly      Compression Stockings MISC by Does not apply route. 1 each 0    Compression Stockings MISC by Does not apply route.  2 each 0       Allergies:  No Known Allergies     Social History:   reports that he quit smoking about 4 years ago. He smoked 0.00 packs per day for 44.00 years. He has never used smokeless tobacco. He reports current alcohol use. He reports that he does not use drugs. Family History:  family history includes Cancer in his brother, brother, father, mother, and sister.      Physical Exam:  BP 96/61   Pulse 85   Temp 98.2 °F (36.8 °C) (Oral)   Resp 18   Ht 6' 5\" (1.956 m)   Wt 198 lb 4.8 oz (89.9 kg)   SpO2 91%   BMI 23.51 kg/m²     General appearance: Pleasant elderly male resting comfortably in bed NAD  Eyes: Sclera clear without conjunctival injection; PERRLA; EOMI  ENT: Mucous membranes moist without thrush; normal dentition  Neck: Supple without meningismus; no goiter; no carotid bruit bilaterally  Cardiovascular: Regular rhythm with occasional ectopy; normal S1-S2 with no murmurs; no peripheral edema; no JVD  Respiratory: No tachypnea; prominent inspiratory/expiratory wheezing without rhonchi or rales  Gastrointestinal: Abdomen soft, non-tender, not distended; bowel sounds normal x4 quadrants; no masses/organomegaly appreciated  Musculoskeletal: FROM spine and extremities x4; no gross deformity  Neurology: A&O x3; cranial nerves 2-12 grossly intact; motor 5/5  BUE/BLE; occasional stuttering   psychiatry: Well-groomed with good eye contact; appropriate affect; no visual/auditory hallucination  Skin: Warm, dry, normal turgor; multiple seborrheic keratoses present on posterior chest  PV: 1/4 radial and dorsalis pedis bilaterally; brisk capillary refill    Labs:  CBC:   Lab Results   Component Value Date    WBC 9.9 04/10/2021    RBC 4.71 04/10/2021    HGB 15.5 04/10/2021    HCT 45.8 04/10/2021    MCV 97.2 04/10/2021    MCH 32.9 04/10/2021    MCHC 33.8 04/10/2021    RDW 13.2 04/10/2021     04/10/2021    MPV 8.8 04/10/2021     BMP:    Lab Results   Component Value Date     04/10/2021    K 4.4 04/10/2021     04/10/2021    CO2 30 04/10/2021    BUN 15 04/10/2021    CREATININE 1.2 04/10/2021    CALCIUM 9.7 04/10/2021    GFRAA >60 04/10/2021    GFRAA >60 07/09/2012    LABGLOM 60 04/10/2021    GLUCOSE 116 04/10/2021     CT CHEST PULMONARY EMBOLISM W CONTRAST   Final Result   1. No evidence of pulmonary embolic disease. 2. No acute pulmonary findings. Pulmonary emphysema. 3. Stable mass in the thoracic inlet on the right posterior to the thyroid   gland. 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         XR CHEST PORTABLE   Final Result   1. No acute abnormality. EKG: Ventricular Rate 94 BPM QTc Calculation (Bazett) 510 ms   Atrial Rate 94 BPM P Axis 85 degrees   P-R Interval 130 ms R Axis 80 degrees   QRS Duration 76 ms T Axis 78 degrees   Q-T Interval 408 ms Diagnosis Sinus rhythm with occasional Premature ventricular complexesST & T wave abnormality in leads II/III/aVF as well as V3-6; QTC prolongation       I visualized CXR images and EKG strips personally and agree with documented interpretation    Discussed case  with ED provider    Problem List  Active Problems:    COPD exacerbation (HCC)    Essential hypertension    Prostate cancer (HCC)    Hx of deep venous thrombosis    History of pulmonary embolus (PE)    PAF (paroxysmal atrial fibrillation) (HCC)  Resolved Problems:    * No resolved hospital problems.  *        Assessment/Plan:     EKG ischemia with HEART score 7  -Admit to telemetry floor with serial cardiac enzymes to be obtained overnight  -ASA administered in ED and to be continued at 81 mg PO QAM  -High dose statin therapy initiated overnight; obtain FLP in a.m.  -Nitropaste applied to chest wall (BP permitting)  -ECHO scheduled for a.m  -Lovenox 1 mg/kilogram administered overnight to ensure therapeutic anticoagulation due to concerns for ACS  -Consultation placed to cardiologist for further recommendations    COPD exacerbation with hypoxic respiratory failure  -Continuous pulse oximetry monitoring initiated with PRN supplemental O2   -Continue home maintenance MDIs/nebulizer treatment  -Encourage aggressive pulmonary toilet including incentive spirometry every 4H while awake  -DuoNebs scheduled Q4H while awake  -IV Solu-Medrol scheduled Q12H; POC glucose checks with PRN insulin coverage  -Patient counseled on necessity of smoking cessation; nicotine replacement patch provided    PAF/history DVT  -Patient on chronic anticoagulation with Xarelto which will be resumed in the evening of 04/11/2021    DVT prophylaxis-Lovenox 1 mg/kg dosed overnight due to concerns for possible ACS  Code status-full code  Diet-cardiac RAISA/carb control (receiving IV Solu-Medrol) now, then n.p.o. after midnight  IV access-PIV established in ED      Admit as inpatient. I anticipate hospitalization spanning less more than two midnights for investigation and treatment of the above medically necessary diagnoses. Please note that some part of this chart was generated using Dragon dictation software. Although every effort was made to ensure the accuracy of this automated transcription, some errors in transcription may have occurred inadvertently. If you may need any clarification, please do not hesitate to contact me through [facility].        Rubi Salcido MD    4/11/2021 6:46 AM

## 2021-04-11 NOTE — PROGRESS NOTES
[facility]    [address] [address]    Phone:  [phone]    Fax:  [phone]                                       Hai Hyman   MRN: [id number]    Department:  [facility]   Date of Visit:  5/25/2017           Patient Information     Date Of Birth          2012        Your diagnoses for this visit were:     Upper respiratory tract infection, unspecified type     Atypical chest pain        You were seen by Hiram Sarabia PA-C.        Discharge Instructions       Please continue to monitor this and if his symptoms are not improving follow up with his regular doctor.    24 Hour Appointment Hotline       To make an appointment at any [facility], call [phone] ([phone]). If you don't have a family doctor or clinic, we will help you find one. [facility] clinics are conveniently located to serve the needs of you and your family.             Review of your medicines      Our records show that you are taking the medicines listed below. If these are incorrect, please call your family doctor or clinic.        Dose / Directions Last dose taken    * TYLENOL CHILDRENS 160 MG/5ML suspension   Dose:  15 mg/kg   Generic drug:  acetaminophen        Take 15 mg/kg by mouth every 6 hours as needed for fever or mild pain   Refills:  0        * PEDIACARE CHILDREN 160 MG/5ML suspension   Dose:  15 mg/kg   Generic drug:  acetaminophen        Take 15 mg/kg by mouth every 6 hours as needed for fever or mild pain   Refills:  0        * Notice:  This list has 2 medication(s) that are the same as other medications prescribed for you. Read the directions carefully, and ask your doctor or other care provider to review them with you.            Procedures and tests performed during your visit     Beta strep group A r/o culture    Rapid strep group A screen POCT      Orders Needing Specimen Collection     None      Pending Results     No  4 Eyes Skin Assessment     The patient is being assess for  Admission    I agree that 2 RN's have performed a thorough Head to Toe Skin Assessment on the patient. ALL assessment sites listed below have been assessed. Areas assessed by both nurses:   [x]   Head, Face, and Ears   [x]   Shoulders, Back, and Chest  [x]   Arms, Elbows, and Hands   [x]   Coccyx, Sacrum, and Ischum  [x]   Legs, Feet, and Heels        Does the Patient have Skin Breakdown?   No         Geremias Prevention initiated:  No   Wound Care Orders initiated:  No      [facility] nurse consulted for Pressure Injury (Stage 3,4, Unstageable, DTI, NWPT, and Complex wounds):  No      Nurse 1 eSignature: Electronically signed by Eh Leon RN on 4/11/21 at 4:02 AM EDT    **SHARE this note so that the co-signing nurse is able to place an eSignature**    Nurse 2 eSignature: Electronically signed by Carol Rodriguez RN on 4/11/21 at 5:29 AM EDT orders found from 5/23/2017 to 5/26/2017.            Pending Culture Results     No orders found from 5/23/2017 to 5/26/2017.            Pending Results Instructions     If you had any lab results that were not finalized at the time of your Discharge, you can call the ED Lab Result RN at [phone]. You will be contacted by this team for any positive Lab results or changes in treatment. The nurses are available 7 days a week from 10A to 6:30P.  You can leave a message 24 hours per day and they will return your call.        Test Results From Your Hospital Stay        5/25/2017  6:39 PM      Component Results     Component Value Ref Range & Units Status    Rapid Strep A Screen NEGATIVE neg Final    Internal QC OK Yes  Final                Thank you for choosing [facility]       Thank you for choosing [facility] for your care. Our goal is always to provide you with excellent care. Hearing back from our patients is one way we can continue to improve our services. Please take a few minutes to complete the written survey that you may receive in the mail after you visit with us. Thank you!        [employer] Information     [employer] lets you send messages to your doctor, view your test results, renew your prescriptions, schedule appointments and more. To sign up, go to [email or web address], contact your [facility] clinic or call [phone] during business hours.            Care EveryWhere ID     This is your Care EveryWhere ID. This could be used by other organizations to access your [facility] medical records  [id number]        After Visit Summary       This is your record. Keep this with you and show to your community pharmacist(s) and doctor(s) at your next visit.

## 2021-04-11 NOTE — PROGRESS NOTES
Occupational Therapy   Occupational Therapy Initial Assessment and Treatment Note  Date: 2021   Patient Name: Santos Carlson  MRN: [id number]     : 1950    Date of Service: 2021    Discharge Recommendations:  24 hour supervision or assist, Home with Home health OT(initial  SPV to ensure safety upon return home)  OT Equipment Recommendations  Equipment Needed: No    Assessment   Performance deficits / Impairments: Decreased functional mobility ; Decreased endurance;Decreased ADL status; Decreased balance  Assessment: OT eval complete. Pt presents with the above deficits impacting independence with ADLs, t/fs and mobility. Pt requiring CGA increasing to SBA t/fs and functional mobility x10 ft in room w/o AD. Pt demo'ing slight unsteadiness reporting significant fatigue from \"being starving\", pt NPO. Pt SBA LB ADLs this date. Pt completed BUE/BLE therex seated in chair with intermittent rest breaks d/t decreased activity tolerance and increased SOB, O2 92-93% throughout on 3LO2. Cont POC  Prognosis: Good  Decision Making: Medium Complexity  OT Education: OT Role;Plan of Care;ADL Adaptive Strategies;Transfer Training;Energy Conservation  REQUIRES OT FOLLOW UP: Yes  Activity Tolerance  Activity Tolerance: Treatment limited secondary to medical complications (free text); Patient limited by fatigue  Activity Tolerance: 82/49 throughout session with minimal flucuations upon positional changes, BP increasing to 92/54 upon standing, no dizziness reported, nursing aware. Pt reporting fatigue d/t NPO status  Safety Devices  Safety Devices in place: Yes  Type of devices: All fall risk precautions in place;Call light within reach; Chair alarm in place;Gait belt;Nurse notified; Left in chair  Restraints  Initially in place: No           Patient Diagnosis(es): The encounter diagnosis was Shortness of breath.      has a past medical history of Abdominal Pain, Anxiety State, BPH (benign prostatic hyperplasia), Cancer [facility]), Chest pain, Colon polyp, DVT, lower extremity ([facility] [address] Hernia, HYPERTENSION, PE (pulmonary embolism), Prostatitis, Hx of, and SBO (small bowel obstruction) ([facility] [address] has a past surgical history that includes hernia repair; Prostate biopsy; Tonsillectomy; other surgical history (1/16/2014); and Inguinal hernia repair (Right, 1/23/14).            Restrictions  Restrictions/Precautions  Restrictions/Precautions: General Precautions  Position Activity Restriction  Other position/activity restrictions: up with assist, tele, 3LO2    Subjective   General  Chart Reviewed: Yes  Patient assessed for rehabilitation services?: Yes  Family / Caregiver Present: No  Diagnosis: decomensated heart failure  Subjective  Subjective: Pt semi-supine in bed upon arrival and agreeable to session  General Comment  Comments: RN approval prior to session  Patient Currently in Pain: Denies  Vital Signs  BP: (!) 82/49  BP Location: Left Arm  Patient Position: Semi fowlers  Level of Consciousness: Alert (0)  Patient Currently in Pain: Denies  Oxygen Therapy  SpO2: 94 %  Pulse Oximeter Device Mode: Intermittent  Pulse Oximeter Device Location: Finger  O2 Device: Nasal cannula(3L)  Social/Functional History  Social/Functional History  Lives With: Alone  Type of Home: Apartment  Home Layout: (3rd level)  Home Access: Stairs to enter with rails  Entrance Stairs - Number of Steps: 21 step to apartment, 3 step to enter building  Entrance Stairs - Rails: Both  Bathroom Shower/Tub: Tub/Shower unit  Bathroom Toilet: Standard  Bathroom Accessibility: Walker accessible  Receives Help From: Family(Daughter and son- PRN)  ADL Assistance: Independent  Homemaking Assistance: Independent  Ambulation Assistance: Independent  Transfer Assistance: Independent  Active : Yes  Mode of Transportation: Car  Occupation: Retired  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[address], material management  Leisure & Hobbies: Reading       Objective Orientation  Overall Orientation Status: Within Functional Limits  Observation/Palpation  Posture: Fair  Balance  Sitting Balance: Independent  Standing Balance: Contact guard assistance(CGA increasing to SBA)  Standing Balance  Time: x2 mins x4  Activity: t/f, mobility, static standing  Comment: CGA increasing to SBA no AD  Functional Mobility  Activity: Other  Assist Level: Contact guard assistance  ADL  Feeding: Independent  LE Dressing: Stand by assistance(pt able to don socks/shoes EOB w/o assist, SBA standing portions to adjust pants)  Tone RUE  RUE Tone: Normotonic  Tone LUE  LUE Tone: Normotonic  Coordination  Movements Are Fluid And Coordinated: Yes     Bed mobility  Supine to Sit: Stand by assistance  Sit to Supine: Unable to assess  Comment: seated in chair EOS  Transfers  Sit to stand: Stand by assistance  Stand to sit: Stand by assistance  Transfer Comments: no AD     Cognition  Overall Cognitive Status: WFL        Sensation  Overall Sensation Status: WFL        LUE AROM (degrees)  LUE AROM : WFL  RUE AROM (degrees)  RUE AROM : WFL  LUE Strength  Gross LUE Strength: WFL  RUE Strength  Gross RUE Strength: WFL                   Plan   Plan  Times per week: 3-5x/wk  Current Treatment Recommendations: Balance Training, Functional Mobility Training, Endurance Training, Self-Care / ADL      AM-[facility] Score        [facility] Inpatient Daily Activity Raw Score: 21 (04/11/21 1009)  AM-[facility] Inpatient ADL T-Scale Score : 44.27 (04/11/21 1009)  ADL Inpatient CMS 0-100% Score: 32.79 (04/11/21 1009)  ADL Inpatient CMS G-Code Modifier : Velvet Gabriella (04/11/21 1009)    Goals  Short term goals  Time Frame for Short term goals: 1-2 weeks  Short term goal 1: Pt will complete ADL t/fs with distant SPV by 4/17. Short term goal 2: Pt will complete toileting with SPV. Short term goal 3: Pt will complete UB/LB dressing with SPV. Short term goal 4: Pt will complete x3 sets of x15 reps BUE therex w/o SOB to increase endurance.   Patient Goals Patient goals : Return home       Therapy Time   Individual Concurrent Group Co-treatment   Time In 0905         Time Out 0938         Minutes 33         Timed Code Treatment Minutes: 23 Minutes(10 min eval)       Rey Gonzales, OTR/L

## 2021-04-11 NOTE — PROGRESS NOTES
Hospitalist Progress Note      PCP: Aldo Santos MD    Date of Admission: 4/10/2021    Chief Complaint: SOB     Hospital Course: H & P reviewed. Pt admitted with COPD exacerbation, elevated troponin     Subjective:      Pt reports SOB improving. Denied any chest pain, fever or chills       Medications:  Reviewed    Infusion Medications    sodium chloride       Scheduled Medications    aspirin  81 mg Oral Daily    methylPREDNISolone  60 mg Intravenous Q12H    sodium chloride flush  10 mL Intravenous 2 times per day    nitroglycerin  1 inch Topical 3 times per day    pantoprazole  40 mg Oral QAM AC    losartan  25 mg Oral Daily    furosemide  20 mg Intravenous BID    atorvastatin  80 mg Oral Nightly    metoprolol tartrate  12.5 mg Oral BID    rivaroxaban  20 mg Oral Dinner    ipratropium-albuterol  1 ampule Inhalation Q4H     PRN Meds: perflutren lipid microspheres, sodium chloride flush, sodium chloride, senna, acetaminophen **OR** acetaminophen, potassium chloride **OR** potassium alternative oral replacement **OR** potassium chloride, magnesium sulfate, promethazine **OR** [DISCONTINUED] ondansetron, ipratropium-albuterol      Intake/Output Summary (Last 24 hours) at 4/11/2021 0806  Last data filed at 4/11/2021 0545  Gross per 24 hour   Intake 240 ml   Output 1250 ml   Net -1010 ml       Physical Exam Performed:    BP (!) 94/59   Pulse 75   Temp 98.4 °F (36.9 °C) (Oral)   Resp 17   Ht 6' 5\" (1.956 m)   Wt 198 lb 4.8 oz (89.9 kg)   SpO2 92%   BMI 23.51 kg/m²     General appearance: No apparent distress, appears stated age and cooperative. HEENT: Pupils equal, round,. Conjunctivae/corneas clear. Neck: Supple, with full range of motion. No jugular venous distention. Trachea midline. Respiratory:  Normal respiratory effort. Decreased air entry bilat with diffuse exp wheezing   Cardiovascular: Regular rate and rhythm with normal S1/S2 without murmurs, rubs or gallops.   Abdomen: Soft, non-tender, non-distended with normal bowel sounds. Musculoskeletal: No clubbing, cyanosis or edema bilaterally. Skin: warm and dry   Neurologic:  Alert speech clear with no overt facial droop  Psychiatric: Alert and oriented, thought content appropriate, normal insight         Labs:   Recent Labs     04/10/21  1405   WBC 9.9   HGB 15.5   HCT 45.8        Recent Labs     04/10/21  1405      K 4.4      CO2 30   BUN 15   CREATININE 1.2   CALCIUM 9.7     Recent Labs     04/10/21  1405   AST 13*   ALT 13   BILITOT 0.7   ALKPHOS 97     Recent Labs     04/10/21  1405   INR 1.41*     Recent Labs     04/10/21  1405 04/10/21  2128   TROPONINI 0.02* <0.01       Urinalysis:      Lab Results   Component Value Date    NITRU Negative 04/07/2014    WBCUA None seen 04/07/2014    BACTERIA 2+ 04/07/2014    RBCUA 3-5 04/07/2014    BLOODU TRACE-LYSED 04/07/2014    SPECGRAV 1.025 04/07/2014    GLUCOSEU Negative 04/07/2014    GLUCOSEU NEGATIVE 03/27/2012       Radiology:  CT CHEST PULMONARY EMBOLISM W CONTRAST   Final Result   1. No evidence of pulmonary embolic disease. 2. No acute pulmonary findings. Pulmonary emphysema. 3. Stable mass in the thoracic inlet on the right posterior to the thyroid   gland. 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         XR CHEST PORTABLE   Final Result   1. No acute abnormality. Assessment/Plan:    Active Hospital Problems    Diagnosis    PAF (paroxysmal atrial fibrillation) ([facility]) [I48.0]    COPD exacerbation ([facility]) [J44.1]    History of pulmonary embolus (PE) [Z86.711]    Hx of deep venous thrombosis [Z86.718]    Prostate cancer ([facility] [address] [C61]    Essential hypertension [I10]     COPD exacerbation: continue steroids, bronchodilators. SOB : likely due to above. CXR non acute , CTA chest neg for acute PE.  Pro BNP elevated but no  pulm edema noted on CT, not vol overloaded on exam. Will d/c IV lasix. S/p recent COVID vaccine on 4/4/2021. Supportive care for COPD as stated above     Elevated trop with abnormal EKG: concerning for ACS. Trop downtrended. Report recent stress test at [facility] about 6 months ago which was abnormal.  Pt denied any chest pain. Cardio consulted. Continue aspirin, statin, xarelto, BB, ARB. Obtain stress test results from [facility]      Hx of A fib : rate controlled.  Continue xarelto, BB       DVT Prophylaxis: xarelto  Diet: Diet NPO Time Specified  Code Status: Full Code    PT/OT Eval Status: not indicated at this time     Dispo - pending cardio eval and clinical course     Josesito Grigsby MD

## 2021-04-11 NOTE — PROGRESS NOTES
RESPIRATORY THERAPY ASSESSMENT    Name:  Hope Thomas  Medical Record Number:  [id number]  Age: 72 y.o. Gender: male  : 1950  Today's Date:  4/10/2021  Room:  [address] [place] [address]    Assessment     Is the patient being admitted for a COPD or Asthma exacerbation? Yes   (If yes the patient will be seen every 4 hours for the first 24 hours and then reassessed)    Patient Admission Diagnosis      Allergies  No Known Allergies    Minimum Predicted Vital Capacity:     1335          Actual Vital Capacity:      1250              Pulmonary History:COPD  Home Oxygen Therapy:  room air  Home Respiratory Therapy:Albuterol PRN  Current Respiratory Therapy:  HHN Duoneb Q4 and PRN  Treatment Type: HHN, IS  Medications: Albuterol/Ipratropium    Respiratory Severity Index(RSI)   Patients with orders for inhalation medications, oxygen, or any therapeutic treatment modality will be placed on Respiratory Protocol. They will be assessed with the first treatment and at least every 72 hours thereafter. The following severity scale will be used to determine frequency of treatment intervention.     Smoking History: Mild Exacerbation = 3    Social History  Social History     Tobacco Use    Smoking status: Former Smoker     Packs/day: 0.00     Years: 44.00     Pack years: 0.00     Quit date: 3/17/2017     Years since quittin.0    Smokeless tobacco: Never Used   Substance Use Topics    Alcohol use: Yes     Comment: rarely    Drug use: No       Recent Surgical History: None = 0  Past Surgical History  Past Surgical History:   Procedure Laterality Date    HERNIA REPAIR      INGUINAL HERNIA REPAIR Right 14    incarcerated right inguinal hernia repair    OTHER SURGICAL HISTORY  2014    ROBOTIC ASSISTED LAPAROSCOPIC PROSTATECTOMY    PROSTATE BIOPSY      TONSILLECTOMY         Level of Consciousness: Alert, Oriented, and Cooperative = 0    Level of Activity: Walking with assistance = 1    Respiratory Pattern: Dyspnea with exertion;Irregular pattern;or RR less than 6 = 2    Breath Sounds: Absent bilaterally and/or with wheezes = 3    Sputum   ,  ,    Cough: Strong, spontaneous, non-productive = 0    Vital Signs   /87   Pulse 100   Temp 97.9 °F (36.6 °C) (Oral)   Resp 18   Ht 6' 5\" (1.956 m)   Wt 201 lb 1 oz (91.2 kg)   SpO2 91%   BMI 23.84 kg/m²   SPO2 (COPD values may differ): Greater than or equal to 92% on room air = 0    Peak Flow (asthma only): not applicable = 0    RSI: 6-73 = TID (three times daily) and Q4hr PRN for dyspnea        Plan       Goals: medication delivery, mobilize retained secretions, volume expansion and improve oxygenation    Patient/caregiver was educated on the proper method of use for Respiratory Care Devices:  Yes      Level of patient/caregiver understanding able to:   ? Verbalize understanding   ? Demonstrate understanding       ? Teach back        ? Needs reinforcement       ? No available caregiver               ? Other:     Response to education:  Good     Is patient being placed on Home Treatment Regimen? No     Does the patient have everything they need prior to discharge? NA     Comments: Patient admits with shortness of breath. Plan of Care: HHN Duoneb Q4 and PRN    Electronically signed by Josiah Blanco RCP on 4/10/2021 at 9:50 PM    Respiratory Protocol Guidelines     1. Assessment and treatment by Respiratory Therapy will be initiated for medication and therapeutic interventions upon initiation of aerosolized medication. 2. Physician will be contacted for respiratory rate (RR) greater than 35 breaths per minute. Therapy will be held for heart rate (HR) greater than 140 beats per minute, pending direction from physician. 3. Bronchodilators will be administered via Metered Dose Inhaler (MDI) with spacer when the following criteria are met:  a.  Alert and cooperative     b. HR < 140 bpm  c. RR < 30 bpm                d. Can demonstrate a 2-3 second inspiratory hold  4. Bronchodilators will be administered via Hand Held Nebulizer MAICOL [facility]) to patients when ANY of the following criteria are met  a. Incognizant or uncooperative          b. Patients treated with HHN at Home        c. Unable to demonstrate proper use of MDI with spacer     d. RR > 30 bpm   5. Bronchodilators will be delivered via Metered Dose Inhaler (MDI), HHN, Aerogen to intubated patients on mechanical ventilation. 6. Inhalation medication orders will be delivered and/or substituted as outlined below. Aerosolized Medications Ordering and Administration Guidelines:    1. All Medications will be ordered by a physician, and their frequency and/or modality will be adjusted as defined by the patients Respiratory Severity Index (RSI) score. 2. If the patient does not have documented COPD, consider discontinuing anticholinergics when RSI is less than 9.  3. If the bronchospasm worsens (increased RSI), then the bronchodilator frequency can be increased to a maximum of every 4 hours. If greater than every 4 hours is required, the physician will be contacted. 4. If the bronchospasm improves, the frequency of the bronchodilator can be decreased, based on the patient's RSI, but not less than home treatment regimen frequency. 5. Bronchodilator(s) will be discontinued if patient has a RSI less than 9 and has received no scheduled or as needed treatment for 72  Hrs. Patients Ordered on a Mucolytic Agent:    1. Must always be administered with a bronchodilator. 2. Discontinue if patient experiences worsened bronchospasm, or secretions have lessened to the point that the patient is able to clear them with a cough. Anti-inflammatory and Combination Medications:    1. If the patient lacks prior history of lung disease, is not using inhaled anti-inflammatory medication at home, and lacks wheezing by examination or by history for at least 24 hours, contact physician for possible discontinuation.

## 2021-04-11 NOTE — PLAN OF CARE
Problem: OXYGENATION/RESPIRATORY FUNCTION  Goal: Patient will achieve/maintain normal respiratory rate/effort  Description: Respiratory rate and effort will be within normal limits for the patient  Outcome: Ongoing     Patient's EF (Ejection Fraction) is greater than 40%    Heart Failure Medications:  Diuretics[de-identified] Furosemide    (One of the following REQUIRED for EF <40%/SYSTOLIC FAILURE but MAY be used in EF% >40%/DIASTOLIC FAILURE)        ACE[de-identified] None        ARB[de-identified] None         ARNI[de-identified] None    (Beta Blockers)  NON- Evidenced Based Beta Blocker (for EF% >40%/DIASTOLIC FAILURE): Metoprolol TARTrate- Lopressor    Evidenced Based Beta Blocker::(REQUIRED for EF% <40%/SYSTOLIC FAILURE) None  . .................................................................................................................................................. Patient's weights and intake/output reviewed: Yes    Patient's Last Weight: 201 lbs obtained by standing scale. (Admission weight)      Intake/Output Summary (Last 24 hours) at 4/10/2021 2243  Last data filed at 4/10/2021 1829  Gross per 24 hour   Intake --   Output 450 ml   Net -450 ml       Comorbidities Reviewed Yes    Patient has a past medical history of Abdominal Pain, Anxiety State, BPH (benign prostatic hyperplasia), Cancer ([address] Chest pain, Colon polyp, DVT, lower extremity ([address] Hernia, HYPERTENSION, PE (pulmonary embolism), Prostatitis, Hx of, and SBO (small bowel obstruction) ([address] >>For CHF and Comorbidity documentation on Education Time and Topics, please see Education Tab    Progressive Mobility Assessment:  What is this patient's Current Level of Mobility?: Ambulatory-Up Ad Misty  How was this patient Mobilized today?: Edge of Bed, Up to Chair,  Up to Toilet/Shower, and Up in Room                 With Whom? Self                 Level of Difficulty/Assistance: Independent     Pt resting in bed at this time on room air. Pt with complaints of shortness of breath.  Pt without lower extremity edema.      Patient and/or Family's stated Goal of Care this Admission: reduce shortness of breath, increase activity tolerance, better understand heart failure and disease management, and be more comfortable prior to discharge        :

## 2021-04-11 NOTE — CONSULTS
[facility]  [phone]      Attending Physician: Ra Fernando MD  Reason for Consultation/Chief Complaint: Shortness of breath    Subjective   History of Present Illness:  Juan C Marshall is a 72 y.o. patient who presented to the hospital with complaints of shortness of breath and sweats, patient was not feeling well after he received his second dose of Covid vaccine few days back. He denied chest pain. He presented to the emergency room yesterday and was admitted to the hospital with concerns for COPD exacerbation as well as due to elevated troponin level of 0.02, subsequent troponin level was negative. proBNP level was 1062. Chronically, patient says he gets his usual medical care through the [facility] and has a history of COPD, does not use oxygen. Additionally, he states he does see a cardiologist at the [facility], he has had a history of A. fib as well as DVT, he says on chronic anticoagulation with Xarelto and has been compliant with all of his medications. He says he thinks he had a stress test around 6 months ago which was unremarkable as far as he knows. He has been a smoker but has quit recently. Past Medical History:   has a past medical history of Abdominal Pain, Anxiety State, BPH (benign prostatic hyperplasia), Cancer ([address] Chest pain, Colon polyp, DVT, lower extremity ([address] Hernia, HYPERTENSION, PE (pulmonary embolism), Prostatitis, Hx of, and SBO (small bowel obstruction) ([address] Surgical History:   has a past surgical history that includes hernia repair; Prostate biopsy; Tonsillectomy; other surgical history (1/16/2014); and Inguinal hernia repair (Right, 1/23/14). Social History:   reports that he quit smoking about 4 years ago. He smoked 0.00 packs per day for 44.00 years. He has never used smokeless tobacco. He reports current alcohol use. He reports that he does not use drugs.      Family History:  family history includes Cancer in his brother, brother, father, mother, and sister. Home Medications:  Were reviewed and are listed in nursing record and/or below  Prior to Admission medications    Medication Sig Start Date End Date Taking? Authorizing Provider   albuterol sulfate (PROAIR RESPICLICK) 484 (90 Base) MCG/ACT aerosol powder inhalation Inhale 2 puffs into the lungs every 4 hours as needed for Wheezing or Shortness of Breath   Yes Historical Provider, MD   rivaroxaban (XARELTO) 20 MG TABS tablet Take 20 mg by mouth Daily with supper   Yes Historical Provider, MD   metoprolol tartrate (LOPRESSOR) 25 MG tablet Take 12.5 mg by mouth 2 times daily Take 12.5 mg BID   Yes Historical Provider, MD   atorvastatin (LIPITOR) 80 MG tablet Take 80 mg by mouth nightly   Yes Historical Provider, MD   Compression Stockings MISC by Does not apply route. 10/13/14   Katheen Lanes, MD   Compression Stockings MISC by Does not apply route.  2/11/14   Katheen Lanes, MD        CURRENT Medications:  aspirin EC tablet 81 mg, Daily  methylPREDNISolone sodium (SOLU-MEDROL) injection 60 mg, Q12H  perflutren lipid microspheres (DEFINITY) injection 1.65 mg, ONCE PRN  sodium chloride flush 0.9 % injection 10 mL, 2 times per day  sodium chloride flush 0.9 % injection 10 mL, PRN  0.9 % sodium chloride infusion, PRN  senna (SENOKOT) tablet 8.6 mg, Daily PRN  acetaminophen (TYLENOL) tablet 650 mg, Q6H PRN    Or  acetaminophen (TYLENOL) suppository 650 mg, Q6H PRN  potassium chloride (KLOR-CON M) extended release tablet 40 mEq, PRN    Or  potassium bicarb-citric acid (EFFER-K) effervescent tablet 40 mEq, PRN    Or  potassium chloride 10 mEq/100 mL IVPB (Peripheral Line), PRN  magnesium sulfate 2000 mg in 50 mL IVPB premix, PRN  nitroglycerin (NITRO-BID) 2 % ointment 1 inch, 3 times per day  promethazine (PHENERGAN) tablet 12.5 mg, Q6H PRN  pantoprazole (PROTONIX) tablet 40 mg, QAM AC  losartan (COZAAR) tablet 25 mg, Daily  atorvastatin (LIPITOR) tablet 80 mg, Nightly  metoprolol tartrate (LOPRESSOR) tablet 12.5 mg, BID  rivaroxaban (XARELTO) tablet 20 mg, Dinner  ipratropium-albuterol (DUONEB) nebulizer solution 1 ampule, Q4H  ipratropium-albuterol (DUONEB) nebulizer solution 1 ampule, Q4H PRN        Allergies:  Patient has no known allergies. Review of Systems:   A 14 point review of symptoms completed. Pertinent positives identified in the HPI, all other review of symptoms negative as below.       Objective   PHYSICAL EXAM:    Vitals:    04/11/21 0910   BP: (!) 82/49   Pulse:    Resp:    Temp:    SpO2: 94%    Weight: 198 lb 4.8 oz (89.9 kg)         General Appearance:  Alert, cooperative, no distress, appears stated age, sitting in chair   Head:  Normocephalic, without obvious abnormality, atraumatic   Eyes:  PERRL, conjunctiva/corneas clear   Nose: Nares normal, no drainage or sinus tenderness   Throat: Lips, mucosa, and tongue normal   Neck: Supple, symmetrical, trachea midline, no adenopathy, thyroid: not enlarged, symmetric, no tenderness/mass/nodules, no carotid bruit or JVD   Lungs:    Fine bilateral wheezing, respirations unlabored   Chest Wall:  No deformity or tenderness   Heart:  Regular rate and rhythm, S1, S2 normal, distant heart sounds   Abdomen:   Soft, non-tender, bowel sounds active all four quadrants,  no masses, no organomegaly   Extremities: Extremities normal, atraumatic, no cyanosis or edema   Pulses: 2+ and symmetric   Skin: Skin color, texture, turgor normal, no rashes or lesions   Pysch: Normal mood and affect   Neurologic: Normal gross motor and sensory exam.         Labs   CBC:   Lab Results   Component Value Date    WBC 13.1 04/11/2021    RBC 4.43 04/11/2021    HGB 14.3 04/11/2021    HCT 42.9 04/11/2021    MCV 96.9 04/11/2021    RDW 13.5 04/11/2021     04/11/2021     CMP:  Lab Results   Component Value Date     04/11/2021    K 4.4 04/11/2021    K 4.4 04/10/2021    CL 99 04/11/2021    CO2 28 04/11/2021    BUN 25 04/11/2021    CREATININE 1.7 04/11/2021    GFRAA 48 04/11/2021    GFRAA >60 07/09/2012    AGRATIO 1.1 04/10/2021    LABGLOM 40 04/11/2021    GLUCOSE 140 04/11/2021    PROT 7.5 04/10/2021    PROT 7.1 07/09/2012    CALCIUM 9.5 04/11/2021    BILITOT 0.7 04/10/2021    ALKPHOS 97 04/10/2021    AST 13 04/10/2021    ALT 13 04/10/2021     PT/INR:  No results found for: PTINR  HgBA1c:No results found for: LABA1C  Lab Results   Component Value Date    TROPONINI <0.01 04/10/2021         Cardiac Data     Last EKG: Normal sinus rhythm, diffuse ST depression, PVCs    Echo:    2017     Left ventricular systolic function is normal with an ejection fraction of   55-60%. Septal bounce noted. Grade I diastolic dysfunction with normal filling pressure. Systolic pulmonary artery pressure (SPAP) is normal and estimated at 36 mmHg   (RA pressure 3 mmHg). Compared to previous study from 4-7-2014 ejection fraction remains   unchanged. Stress Test:    Cath:    Studies:     Ct chest           Impression   1. No evidence of pulmonary embolic disease. 2. No acute pulmonary findings.  Pulmonary emphysema. 3. Stable mass in the thoracic inlet on the right posterior to the thyroid   gland.  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             I have reviewed labs and imaging/xray/diagnostic testing in this note.     Assessment and Plan          Patient Active Problem List   Diagnosis    Anxiety state    Essential hypertension    Colon polyp    Vitamin D deficiency    [facility]    Lower leg DVT (deep venous thromboembolism), acute (HCC)    Pulmonary embolism (HCC)    Post-phlebitic syndrome    Depression    NSVT (nonsustained ventricular tachycardia) (HCC)    Hx of deep venous thrombosis    COPD exacerbation (HCC)    History of pulmonary embolus (PE)    PAF (paroxysmal atrial fibrillation) (HCC)       Shortness of breath, initially elevated troponin, subsequent values negative, will plan on evaluate this further with echocardiogram and Lexiscan nuclear stress test, CT calcium score, and if noninvasive work-up shows high risk features, consider cardiac catheterization, this was discussed with patient and he understands the risk/benefit/ultrasound/outcomes of this plan and is agreeable to proceeding in this manner. Continue aspirin daily. Hypertension, continue losartan and metoprolol    Hypercholesteremia, continue statin    History of DVT and A. fib, continue Xarelto    COPD, as per primary service    Thank you for allowing us to participate in the care of Winston Back. Please call me with any questions [phone].     Alix Bucio MD, [facility]   Interventional Cardiologist  [facility]  [phone] [facility]  [phone] [facility]  4/11/2021 10:47 AM

## 2021-04-12 ENCOUNTER — APPOINTMENT (OUTPATIENT)
Dept: CT IMAGING | Age: 71
DRG: 190 | End: 2021-04-12
Payer: OTHER GOVERNMENT

## 2021-04-12 LAB
ANION GAP SERPL CALCULATED.3IONS-SCNC: 9 MMOL/L (ref 3–16)
BUN BLDV-MCNC: 48 MG/DL (ref 7–20)
CALCIUM SERPL-MCNC: 9.2 MG/DL (ref 8.3–10.6)
CHLORIDE BLD-SCNC: 96 MMOL/L (ref 99–110)
CO2: 29 MMOL/L (ref 21–32)
CREAT SERPL-MCNC: 2.6 MG/DL (ref 0.8–1.3)
EKG ATRIAL RATE: 82 BPM
EKG DIAGNOSIS: NORMAL
EKG P AXIS: 78 DEGREES
EKG P-R INTERVAL: 132 MS
EKG Q-T INTERVAL: 394 MS
EKG QRS DURATION: 92 MS
EKG QTC CALCULATION (BAZETT): 460 MS
EKG R AXIS: 69 DEGREES
EKG T AXIS: 61 DEGREES
EKG VENTRICULAR RATE: 82 BPM
GFR AFRICAN AMERICAN: 30
GFR NON-AFRICAN AMERICAN: 25
GLUCOSE BLD-MCNC: 149 MG/DL (ref 70–99)
HCT VFR BLD CALC: 40.4 % (ref 40.5–52.5)
HEMOGLOBIN: 13.3 G/DL (ref 13.5–17.5)
LV EF: 58 %
LVEF MODALITY: NORMAL
MAGNESIUM: 2.1 MG/DL (ref 1.8–2.4)
MCH RBC QN AUTO: 32.1 PG (ref 26–34)
MCHC RBC AUTO-ENTMCNC: 33 G/DL (ref 31–36)
MCV RBC AUTO: 97.1 FL (ref 80–100)
PDW BLD-RTO: 13.8 % (ref 12.4–15.4)
PLATELET # BLD: 230 K/UL (ref 135–450)
PMV BLD AUTO: 9.6 FL (ref 5–10.5)
POTASSIUM SERPL-SCNC: 4.5 MMOL/L (ref 3.5–5.1)
RBC # BLD: 4.16 M/UL (ref 4.2–5.9)
SODIUM BLD-SCNC: 134 MMOL/L (ref 136–145)
WBC # BLD: 15.8 K/UL (ref 4–11)

## 2021-04-12 PROCEDURE — 6360000002 HC RX W HCPCS: Performed by: HOSPITALIST

## 2021-04-12 PROCEDURE — 93005 ELECTROCARDIOGRAM TRACING: CPT | Performed by: NURSE PRACTITIONER

## 2021-04-12 PROCEDURE — 97110 THERAPEUTIC EXERCISES: CPT

## 2021-04-12 PROCEDURE — 2700000000 HC OXYGEN THERAPY PER DAY

## 2021-04-12 PROCEDURE — 36415 COLL VENOUS BLD VENIPUNCTURE: CPT

## 2021-04-12 PROCEDURE — 85027 COMPLETE CBC AUTOMATED: CPT

## 2021-04-12 PROCEDURE — 97116 GAIT TRAINING THERAPY: CPT

## 2021-04-12 PROCEDURE — 97535 SELF CARE MNGMENT TRAINING: CPT

## 2021-04-12 PROCEDURE — 93306 TTE W/DOPPLER COMPLETE: CPT

## 2021-04-12 PROCEDURE — 94761 N-INVAS EAR/PLS OXIMETRY MLT: CPT

## 2021-04-12 PROCEDURE — 99233 SBSQ HOSP IP/OBS HIGH 50: CPT | Performed by: NURSE PRACTITIONER

## 2021-04-12 PROCEDURE — 6370000000 HC RX 637 (ALT 250 FOR IP): Performed by: HOSPITALIST

## 2021-04-12 PROCEDURE — 83735 ASSAY OF MAGNESIUM: CPT

## 2021-04-12 PROCEDURE — 80048 BASIC METABOLIC PNL TOTAL CA: CPT

## 2021-04-12 PROCEDURE — 75571 CT HRT W/O DYE W/CA TEST: CPT

## 2021-04-12 PROCEDURE — 94640 AIRWAY INHALATION TREATMENT: CPT

## 2021-04-12 PROCEDURE — 93010 ELECTROCARDIOGRAM REPORT: CPT | Performed by: INTERNAL MEDICINE

## 2021-04-12 PROCEDURE — 1200000000 HC SEMI PRIVATE

## 2021-04-12 PROCEDURE — 2580000003 HC RX 258: Performed by: HOSPITALIST

## 2021-04-12 PROCEDURE — 2580000003 HC RX 258: Performed by: NURSE PRACTITIONER

## 2021-04-12 PROCEDURE — 6370000000 HC RX 637 (ALT 250 FOR IP): Performed by: INTERNAL MEDICINE

## 2021-04-12 RX ORDER — SODIUM CHLORIDE 9 MG/ML
INJECTION, SOLUTION INTRAVENOUS CONTINUOUS
Status: ACTIVE | OUTPATIENT
Start: 2021-04-12 | End: 2021-04-12

## 2021-04-12 RX ADMIN — IPRATROPIUM BROMIDE AND ALBUTEROL SULFATE 1 AMPULE: .5; 2.5 SOLUTION RESPIRATORY (INHALATION) at 03:47

## 2021-04-12 RX ADMIN — IPRATROPIUM BROMIDE AND ALBUTEROL SULFATE 1 AMPULE: .5; 2.5 SOLUTION RESPIRATORY (INHALATION) at 11:55

## 2021-04-12 RX ADMIN — IPRATROPIUM BROMIDE AND ALBUTEROL SULFATE 1 AMPULE: .5; 2.5 SOLUTION RESPIRATORY (INHALATION) at 07:25

## 2021-04-12 RX ADMIN — IPRATROPIUM BROMIDE AND ALBUTEROL SULFATE 1 AMPULE: .5; 2.5 SOLUTION RESPIRATORY (INHALATION) at 00:04

## 2021-04-12 RX ADMIN — Medication 10 ML: at 20:47

## 2021-04-12 RX ADMIN — METHYLPREDNISOLONE SODIUM SUCCINATE 60 MG: 125 INJECTION, POWDER, FOR SOLUTION INTRAMUSCULAR; INTRAVENOUS at 08:27

## 2021-04-12 RX ADMIN — IPRATROPIUM BROMIDE AND ALBUTEROL SULFATE 1 AMPULE: .5; 2.5 SOLUTION RESPIRATORY (INHALATION) at 16:32

## 2021-04-12 RX ADMIN — Medication 10 ML: at 08:28

## 2021-04-12 RX ADMIN — NITROGLYCERIN 1 INCH: 20 OINTMENT TOPICAL at 05:00

## 2021-04-12 RX ADMIN — IPRATROPIUM BROMIDE AND ALBUTEROL SULFATE 1 AMPULE: .5; 2.5 SOLUTION RESPIRATORY (INHALATION) at 23:39

## 2021-04-12 RX ADMIN — METHYLPREDNISOLONE SODIUM SUCCINATE 60 MG: 125 INJECTION, POWDER, FOR SOLUTION INTRAMUSCULAR; INTRAVENOUS at 20:47

## 2021-04-12 RX ADMIN — ASPIRIN 81 MG: 81 TABLET, COATED ORAL at 08:28

## 2021-04-12 RX ADMIN — METOPROLOL TARTRATE 12.5 MG: 25 TABLET, FILM COATED ORAL at 20:46

## 2021-04-12 RX ADMIN — ATORVASTATIN CALCIUM 80 MG: 80 TABLET, FILM COATED ORAL at 20:45

## 2021-04-12 RX ADMIN — SODIUM CHLORIDE: 9 INJECTION, SOLUTION INTRAVENOUS at 11:10

## 2021-04-12 RX ADMIN — RIVAROXABAN 20 MG: 20 TABLET, FILM COATED ORAL at 17:28

## 2021-04-12 RX ADMIN — IPRATROPIUM BROMIDE AND ALBUTEROL SULFATE 1 AMPULE: .5; 2.5 SOLUTION RESPIRATORY (INHALATION) at 19:15

## 2021-04-12 ASSESSMENT — PAIN SCALES - GENERAL
PAINLEVEL_OUTOF10: 0

## 2021-04-12 NOTE — PROGRESS NOTES
Pt c/o CP rated 5/10. Pain resolved 5 minutes after placing nitro paste on his chest, 12 lead EKG did not show any significant changes from prior EKG.

## 2021-04-12 NOTE — CONSULTS
Thanks for consulting [facility] for the care of Juan C Marshall. Full consult will follow  Please call with questions at-  24 Hrs Answering service [phone]  Perfect serve, or cell phone 7 am - 867 [facility], MD   [facility] nephrology  [facility]. [facility]

## 2021-04-12 NOTE — PROGRESS NOTES
[facility]   Daily Progress Note    Admit Date:  4/10/2021  HPI:    Chief Complaint   Patient presents with    Shortness of Breath     pt to ED with c/o SOB since monday. pt got second dose of mederna vaccine on sunday. hx of COPD. no oxygen use at home. given albuterol treatment by EMS. reports some relief. EMS found pt 89% on RA at home        Interval history: Mitchell Valencia is being followed for shortness of breath. Hx of COPD, Afib on xarelto, prior tobacco abuse     Subjective:  Mr. Cy Duckworth had some stabbing chest pain this am.   He continues with wheezing today.    Hx of urinary incontinence    Objective:   BP (!) 95/59   Pulse 88   Temp 98.7 °F (37.1 °C) (Oral)   Resp 18   Ht 6' 5\" (1.956 m)   Wt 199 lb 8 oz (90.5 kg)   SpO2 92%   BMI 23.66 kg/m²       Intake/Output Summary (Last 24 hours) at 4/12/2021 0913  Last data filed at 4/12/2021 0751  Gross per 24 hour   Intake 360 ml   Output 300 ml   Net 60 ml       NYHA: IV    Physical Exam:  General:  Awake, alert, NAD  Skin:  Warm and dry  Neck:  JVD<8  Chest:  Wheezing to auscultation, no rhonchi/rales  Telemetry: NSR Rate 80's  Cardiovascular:  RRR S1S2, no m/r/g   Abdomen:  Soft, nontender, +bowel sounds  Extremities:  Trace bilateral lower extremity edema    Medications:    aspirin  81 mg Oral Daily    methylPREDNISolone  60 mg Intravenous Q12H    sodium chloride flush  10 mL Intravenous 2 times per day    nitroglycerin  1 inch Topical 3 times per day    pantoprazole  40 mg Oral QAM AC    losartan  25 mg Oral Daily    atorvastatin  80 mg Oral Nightly    metoprolol tartrate  12.5 mg Oral BID    rivaroxaban  20 mg Oral Dinner    ipratropium-albuterol  1 ampule Inhalation Q4H      sodium chloride       Lab Data:  CBC:   Recent Labs     04/10/21  1405 04/11/21  0900   WBC 9.9 13.1*   HGB 15.5 14.3    229     BMP:    Recent Labs     04/10/21  1405 04/11/21  0900 04/12/21  0603    135* 134*   K 4.4 4.4 4.5   CO2 30 28 29   BUN 15 25* 48*   CREATININE 1.2 1.7* 2.6*     INR:    Recent Labs     04/10/21  1405   INR 1.41*     BNP:    Recent Labs     04/10/21  1405   PROBNP 1,062*     Lab Results   Component Value Date    LVEF 58 03/23/2017     Testing:  Echo:  2017   Left ventricular systolic function is normal with an ejection fraction of   55-60%. Septal bounce noted. Grade I diastolic dysfunction with normal filling pressure. Systolic pulmonary artery pressure (SPAP) is normal and estimated at 36 mmHg   (RA pressure 3 mmHg). Compared to previous study from 4-7-2014 ejection fraction remains   unchanged. Active Problems:    Essential hypertension    Prostate cancer (HCC)    Hx of deep venous thrombosis    COPD exacerbation (HCC)    History of pulmonary embolus (PE)    PAF (paroxysmal atrial fibrillation) (HCC)  Resolved Problems:    * No resolved hospital problems.  *    Assessment:  Shortness of breath- likely multifactorial, suspect COPD exacerbation   Elevated troponin- mild  Afib- chronic   HTN  HLD  Hx of DVT  COPD  QUITA  Hx of prostate cancer/ urinary incontinence     Plan:  D/C losartan due to QUITA and consult nephrology   D/C nitro paste due to hypotension  Hold metoprolol due to hypotension  Echo pending  Okay for resting part of stress test today- will let him eat today; plan for stress portion tomorrow if wheezing improved  Add IVFs in the meantime and then will defer to nephrology     Discussed with [address], CNP, 4/12/2021, 9:52 AM

## 2021-04-12 NOTE — CONSULTS
[facility] NEPHROLOGY    [facility]              [phone]                      He is admitted with shortness of breath. He is found to have COPD exacerbation and also possible non-STEMI which he is being treated. His course is also complicated by hypotensive episodes. we are following for QUITA and related issues. Interval History and plan:      Blood Pressure is low  Creatinine coming up  Stress test is being done  With 300 mL urine-he also has incontinence and not all urine is accounted for  Plan:  Agree with holding metoprolol, losartan and Nitropaste  Agree with IV fluids  Higher blood pressure will help renal recovery fast       UA-ordered            Assessment :     Acute Kidney Injury  QUITA likely due to -dynamic changes-hypotension, but he also got contrast on the day of admission, also has been on diuretic which has been  held now  Cr on consultation 2.6  Baseline Cr-1.1 on 3/17  UA- pending  Renal Imaging:-CT 2014-no mention of renal abnormality,  No indication to do at this time due to QUITA admitting due to shift and blood pressure and contrast   -History of prostate cancer status post prostatectomy, also has urinary incontinence  Echo: pending      Hypertension   BP: ()/(57-61)  Pulse:  [86-88]   BP goal inpatient [phone] systolic inpatient  Blood pressure low on consult  Atrial fibrillation    COPD exacerbation  Elevated troponin  [address]  [facility] Nephrology would like to thank Sim Fried MD   for opportunity to serve this patient      Please call with questions at-   24 Hrs Answering service [phone] or  7 am- 5 pm via Perfect serve or cell phone  Dr.Sudhir Keshav Vazquez          CC/reason for consult :     QUITA     HPI :     Ernestina Norris is a 72 y.o. male presented to   the hospital on 4/10/2021 with shortness of breath for several weeks. If any has shortness of breath for some time which got worse after COVID-19 vaccine.   He is known to have COPD but not on oxygen dependence. He also has a history of smoking up until several weeks before this hospitalization. He came to the emergency room where an EKG was done which showed possible ST segment depression. His troponin was slightly up BNP was up at 1000. He has had chest x-ray done which showed no acute disease. He had a CT PE done which was negative for thromboembolism. Seen by cardiology and plan for possible angiogram this hospitalization  In the meantime we are consulted for QUITA and related issues    ROS:     Seen with- RN     positives in bold   Constitutional:  fever, chills, weakness, weight change, fatigue  Skin:  rash, pruritus, hair loss, bruising, dry skin, petechiae  Head, Face, Neck   headaches, swelling,  cervical adenopathy  Respiratory: shortness of breath, cough, or wheezing  Cardiovascular: chest pain, palpitations, dizzy, edema  Gastrointestinal: nausea, vomiting, diarrhea, constipation,belly pain    Yellow skin, blood in stool  Musculoskeletal:  back pain, muscle weakness, gait problems,       joint pain or swelling. Genitourinary:  dysuria, poor urine flow, flank pain, blood in urine  Neurologic:  vertigo, TIA'S, syncope, seizures, focal weakness  Psychosocial:  insomnia, anxiety, or depression.   Additional positive findings: SOB and wheeze much better than on admission                   All other remaining systems are negative or unable to obtain        PMH/PSH/SH/Family History:     Past Medical History:   Diagnosis Date    Abdominal Pain     Anxiety State     BPH (benign prostatic hyperplasia) 10/3/2013    Cancer ([facility] [address] 2014    prostate    Chest pain     Colon polyp 3/27/2012    DVT, lower extremity ([facility] [address]     Left Leg    Hernia 1/24/2014    HYPERTENSION     PE (pulmonary embolism) 2/7/2014    Pt denies    Prostatitis, Hx of     SBO (small bowel obstruction) ([facility] [address] 1/24/2014       Past Surgical History:   Procedure Laterality Date    HERNIA REPAIR      INGUINAL HERNIA REPAIR Right 1/23/14    incarcerated right inguinal hernia repair    OTHER SURGICAL HISTORY  1/16/2014    ROBOTIC ASSISTED LAPAROSCOPIC PROSTATECTOMY    PROSTATE BIOPSY      TONSILLECTOMY          reports that he quit smoking about 4 years ago. He smoked 0.00 packs per day for 44.00 years. He has never used smokeless tobacco. He reports current alcohol use. He reports that he does not use drugs. family history includes Cancer in his brother, brother, father, mother, and sister.          Medication:     Current Facility-Administered Medications: 0.9 % sodium chloride infusion, , Intravenous, Continuous  aspirin EC tablet 81 mg, 81 mg, Oral, Daily  regadenoson (LEXISCAN) injection 0.4 mg, 0.4 mg, Intravenous, ONCE PRN  methylPREDNISolone sodium (SOLU-MEDROL) injection 60 mg, 60 mg, Intravenous, Q12H  perflutren lipid microspheres (DEFINITY) injection 1.65 mg, 1.5 mL, Intravenous, ONCE PRN  sodium chloride flush 0.9 % injection 10 mL, 10 mL, Intravenous, 2 times per day  sodium chloride flush 0.9 % injection 10 mL, 10 mL, Intravenous, PRN  0.9 % sodium chloride infusion, 25 mL, Intravenous, PRN  senna (SENOKOT) tablet 8.6 mg, 1 tablet, Oral, Daily PRN  acetaminophen (TYLENOL) tablet 650 mg, 650 mg, Oral, Q6H PRN **OR** acetaminophen (TYLENOL) suppository 650 mg, 650 mg, Rectal, Q6H PRN  potassium chloride (KLOR-CON M) extended release tablet 40 mEq, 40 mEq, Oral, PRN **OR** potassium bicarb-citric acid (EFFER-K) effervescent tablet 40 mEq, 40 mEq, Oral, PRN **OR** potassium chloride 10 mEq/100 mL IVPB (Peripheral Line), 10 mEq, Intravenous, PRN  magnesium sulfate 2000 mg in 50 mL IVPB premix, 2,000 mg, Intravenous, PRN  promethazine (PHENERGAN) tablet 12.5 mg, 12.5 mg, Oral, Q6H PRN **OR** [DISCONTINUED] ondansetron (ZOFRAN) injection 4 mg, 4 mg, Intravenous, Q6H PRN  pantoprazole (PROTONIX) tablet 40 mg, 40 mg, Oral, QAM AC  atorvastatin (LIPITOR) tablet 80 mg, 80 mg, Oral, Nightly  metoprolol tartrate (LOPRESSOR) tablet 12.5 mg, 12.5 mg, Oral, BID  rivaroxaban (XARELTO) tablet 20 mg, 20 mg, Oral, Dinner  ipratropium-albuterol (DUONEB) nebulizer solution 1 ampule, 1 ampule, Inhalation, Q4H  ipratropium-albuterol (DUONEB) nebulizer solution 1 ampule, 1 ampule, Inhalation, Q4H PRN       Vitals :     Vitals:    04/12/21 0817   BP: (!) 95/59   Pulse:    Resp:    Temp:    SpO2:        I & O :       Intake/Output Summary (Last 24 hours) at 4/12/2021 1128  Last data filed at 4/12/2021 0751  Gross per 24 hour   Intake 360 ml   Output 300 ml   Net 60 ml        Physical Examination :     General appearance: Anxious- no, distressed- no, in good spirits- yes  HEENT: Lips- normal, teeth- ok , oral mucosa- moist  Neck : Mass- no, appears symmetrical, JVD- not visible  Respiratory: Respiratory effort-comfortable on oxygen, wheeze-yes, crackles -no  Cardiovascular:  Ausculation- No M/R/G, Edema no  Abdomen: visible mass- no, distention- no, scar- no, tenderness- no                            hepatosplenomegaly-  no  Musculoskeletal:  clubbing no,cyanosis- no , digital ischemia- no                           muscle strength- grossly normal , tone - grossly normal  Skin: rashes- no , ulcers- no, induration- no, tightening - no  Psychiatric:  Judgement and insight- normal           AAO X 3  Additional finding: -      LABS:     Recent Labs     04/10/21  1405 04/11/21  0900 04/12/21  0603   WBC 9.9 13.1* 15.8*   HGB 15.5 14.3 13.3*   HCT 45.8 42.9 40.4*    229 230     Recent Labs     04/10/21  1405 04/11/21  0900 04/12/21  0603    135* 134*   K 4.4 4.4 4.5    99 96*   CO2 30 28 29   BUN 15 25* 48*   CREATININE 1.2 1.7* 2.6*   GLUCOSE 116* 140* 149*   MG  --  2.00 2.10

## 2021-04-12 NOTE — PROGRESS NOTES
Physical Therapy  Facility/Department: [facility] A2 CARD TELEMETRY  Daily Treatment Note  NAME: Hope Thomas  : 1950  MRN: [id number]    Date of Service: 2021    Discharge Recommendations:  24 hour supervision or assist, Home with Home health PT        Assessment   Body structures, Functions, Activity limitations: Decreased functional mobility ; Decreased endurance;Decreased strength;Decreased balance  Assessment: Pt is requiring supervision for transfers and CGA/min A for gait up to 80'. Pt has been limited by decreased endurance associated with SOB and low BP and will benefit from PT tx to continue to progress strength and mobility as tolerated. Barriers to tx include decreased endurance, activity tolerance, and low BP. Recommending home d/c with initial 24 hour assist with [facility] therapy. Treatment Diagnosis: Decreased activity tolerance associated with SOB  Prognosis: Good  Decision Making: Medium Complexity  Patient Education: Pt was educated regarding PT role in tx, as well as monitoring for dizziness/SOB when changing positions. Pt voiced understanding and will continue to benefit from skilled education. Barriers to Learning: none  REQUIRES PT FOLLOW UP: Yes  Activity Tolerance  Activity Tolerance: Patient Tolerated treatment well;Patient limited by endurance  Activity Tolerance: 141/80 BP  93% on RA  102 HR     Patient Diagnosis(es): The encounter diagnosis was Shortness of breath. has a past medical history of Abdominal Pain, Anxiety State, BPH (benign prostatic hyperplasia), Cancer ([address] Chest pain, Colon polyp, DVT, lower extremity ([address] Hernia, HYPERTENSION, PE (pulmonary embolism), Prostatitis, Hx of, and SBO (small bowel obstruction) ([address] has a past surgical history that includes hernia repair; Prostate biopsy; Tonsillectomy; other surgical history (2014); and Inguinal hernia repair (Right, 14).     Restrictions  Restrictions/Precautions  Restrictions/Precautions: General Precautions, Fall Risk  Position Activity Restriction  Other position/activity restrictions: up with assist, tele, 3LO2  Subjective   General  Chart Reviewed: Yes  Additional Pertinent Hx: H/o COPD and CHF  Response To Previous Treatment: Patient with no complaints from previous session. Family / Caregiver Present: No  Referring Practitioner: Dr. José Miguel Hartley  Subjective  Subjective: Pt presents in supine and agreeable to tx.   General Comment  Comments: Kelly LAMBERT cleared          Orientation  Orientation  Overall Orientation Status: Within Normal Limits  Cognition      Objective   Bed mobility  Supine to Sit: Modified independent  Transfers  Sit to Stand: Supervision  Stand to sit: Supervision  Ambulation  Ambulation?: Yes  Ambulation 1  Surface: level tile  Device: No Device  Assistance: Contact guard assistance;Minimal assistance  Quality of Gait: Unsteady on feet, narrow HERBERT, one LOB min A to correct  Gait Deviations: Slow Yadi  Distance: 80' x 2  Comments: Pt limited by fatigue and decreased stability w/ increased distances  Stairs/Curb  Stairs?: No     Balance  Sitting - Static: Good  Sitting - Dynamic: Good  Standing - Static: Fair  Standing - Dynamic: Fair  Exercises  Hip Flexion: BLE x15  Knee Long Arc Quad: BLE x15  Ankle Pumps: BLE x15             AM-PAC Score  AM-PAC Inpatient Mobility Raw Score : 20 (04/12/21 1709)  AM-PAC Inpatient T-Scale Score : 47.67 (04/12/21 1709)  Mobility Inpatient CMS 0-100% Score: 35.83 (04/12/21 1709)  Mobility Inpatient CMS G-Code Modifier : CJ (04/12/21 1709)          Goals  Short term goals  Time Frame for Short term goals: 1 week (4/18) (unless otherwise specified)  Short term goal 1: Pt will perform bed mobility independently without SOB -4/12 met  Short term goal 2: Pt will perform t/f independently -4/12 superv  Short term goal 3: Pt will ambulate 150' independently without SOB -4/12 80' NAD cga/min  Short term goal 4: Pt will climb 24 steps with SBA-supervision without exacerbation of SOB  -4/12  Short term goal 5: 4/15 Pt will be independent in home exercise program  -4/12  initiated  Patient Goals   Patient goals : \"To get better at breathing\"    Plan    Plan  Times per week: 3-5x/week  Times per day: Daily  Current Treatment Recommendations: Balance Training, Stair training, Functional Mobility Training, Home Exercise Program, Patient/Caregiver Education & Training, Strengthening, Endurance Training, Safety Education & Training, Transfer Training, Gait Training  Plan Comment: Progress functional mobility as tolerated  Safety Devices  Type of devices:  All fall risk precautions in place, Call light within reach, Nurse notified, Gait belt, Patient at risk for falls, Bed alarm in place, Left in bed     Therapy Time   Individual Concurrent Group Co-treatment   Time In 1640         Time Out 1708         Minutes 28         Timed Code Treatment Minutes: [address]ousif [address]12

## 2021-04-12 NOTE — CONSULTS
Comprehensive Nutrition Assessment    Type and Reason for Visit:  Initial, Positive Nutrition Screen(MST=2: weight loss, decreased appetite)    Nutrition Recommendations/Plan:   1. Continue cardiac, 2 gm sodium diet - monitor need for liberalization if PO intakes decrease   2. Add Ensure TID - monitor acceptance and need for alternative ONS   3. Encourage PO and protein intakes  4. Monitor nutrition adequacy, pertinent labs, bowel habits, wt changes, and clinical progress    Nutrition Assessment:  Pt admitted with SOB 2/2 COPD exacerbation. Hx of CAD, A fib and COPD. Pt nutritionally compromised AEB decreased PO intakes PTA and weight loss. Pt reports good appetite, but c/o weakness. Pt becoming tired while chewing foods, frequently falling asleep during meals and not finishing. Pt reports occasionally consuming ONS at home, will add. Pt reports consuming greater than 50% of lunch this afternoon. Encouraged high protein/high calorie foods at home for increased energy. No noted diagnosis of CHF, will defer CHF diet education at this time. Will continue to monitor. Malnutrition Assessment:  Malnutrition Status: At risk for malnutrition (Comment)    Context:  Acute Illness     Findings of the 6 clinical characteristics of malnutrition:  Energy Intake:  1 - 75% or less of estimated energy requirements for 7 or more days    Estimated Daily Nutrient Needs:  Energy (kcal):  3487-0692 kcal; Weight Used for Energy Requirements:  Current(90 kg)     Protein (g):   g; Weight Used for Protein Requirements:  Current(1.0-1.2 g/kg)        Fluid (ml/day):   ; Method Used for Fluid Requirements:  1 ml/kcal      Nutrition Related Findings:  Trace BLE edema. C/o weakness. Wounds:  None       Current Nutrition Therapies:    DIET CARDIAC; Low Sodium (2 GM);  No Caffeine  Diet NPO, After Midnight    Anthropometric Measures:  · Height: 6' 5\" (195.6 cm)  · Current Body Weight: 199 lb (90.3 kg)   · Usual Body Weight: (215-220 lb per pt)     · Ideal Body Weight: 208 lbs; % Ideal Body Weight 95.7 %   · BMI: 23.6  · BMI Categories: Normal Weight (BMI 22.0 to 24.9) age over 72       Nutrition Diagnosis:   · Inadequate oral intake related to inadequate protein-energy intake as evidenced by poor intake prior to admission, weight loss      Nutrition Interventions:   Food and/or Nutrient Delivery:  Continue Current Diet, Start Oral Nutrition Supplement  Nutrition Education/Counseling:  Education initiated(High protein/high calorie)   Coordination of Nutrition Care:  Continue to monitor while inpatient    Goals:  Pt will consume greater than 50% of meals and ONS this admission w/o further weight loss       Nutrition Monitoring and Evaluation:   Behavioral-Environmental Outcomes:  None Identified   Food/Nutrient Intake Outcomes:  Food and Nutrient Intake, Supplement Intake  Physical Signs/Symptoms Outcomes:  Nutrition Focused Physical Findings, Weight     Discharge Planning:    Continue current diet, Continue Oral Nutrition Supplement     Electronically signed by Gena Diana MS, RD, LD on 4/12/21 at 3:24 PM EDT    Contact: [phone]

## 2021-04-12 NOTE — PROGRESS NOTES
Occupational Therapy  Facility/Department: [facility] A2 CARD TELEMETRY  Daily Treatment Note  NAME: Christiana Rodriguez  : 1950  MRN: [id number]    Date of Service: 2021    Discharge Recommendations:  24 hour supervision or assist, Home with Home health OT     Assessment   Performance deficits / Impairments: Decreased functional mobility ; Decreased endurance;Decreased ADL status; Decreased balance  Assessment: Pt performed bathoom mobility and functional transfers at SBA level without device. No LOB occurred during ADLs in standing. He does demo fatigue and decreased endurance with activity. Recommend HHOT at d/c. Cont skilled OT in acute care. Prognosis: Good  OT Education: OT Role;Plan of Care;ADL Adaptive Strategies;Transfer Training;Energy Conservation;Home Exercise Program  Patient Education: disease specific ed: role of OT, energy conservation, UE exer program-pt receptive to ed  REQUIRES OT FOLLOW UP: Yes  Activity Tolerance  Activity Tolerance: Patient Tolerated treatment well  Activity Tolerance: BP 95/59, HR 88, O2 sats 92%  Safety Devices  Safety Devices in place: Yes  Type of devices: Gait belt;Call light within reach;Nurse notified; Left in bed       Patient Diagnosis(es): The encounter diagnosis was Shortness of breath. has a past medical history of Abdominal Pain, Anxiety State, BPH (benign prostatic hyperplasia), Cancer ([address] Chest pain, Colon polyp, DVT, lower extremity ([address] Hernia, HYPERTENSION, PE (pulmonary embolism), Prostatitis, Hx of, and SBO (small bowel obstruction) ([address] has a past surgical history that includes hernia repair; Prostate biopsy; Tonsillectomy; other surgical history (2014); and Inguinal hernia repair (Right, 14).     Restrictions  Restrictions/Precautions  Restrictions/Precautions: General Precautions, Fall Risk  Position Activity Restriction  Other position/activity restrictions: up with assist, tele, 3LO2  Subjective   General  Chart Reviewed: Yes  Patient assessed for rehabilitation services?: Yes  Family / Caregiver Present: No  Referring Practitioner: Romulo Crenshaw  Diagnosis: decompensated heart failure  Subjective  Subjective: Pt agreeable to OT  General Comment  Comments: RN approved therapy  Pain Assessment  Pain Level: 0  Vital Signs  BP: (!) 95/59  Patient Currently in Pain: Denies   Orientation  Orientation  Overall Orientation Status: Within Normal Limits  Objective    ADL  Feeding: Independent  Grooming: Supervision(to stand at sink to brush teeth, comb hair and wash hands)  LE Dressing: Stand by assistance(change pad)  Toileting: Stand by assistance     Balance  Sitting Balance: Independent  Standing Balance: Stand by assistance(no device)  Standing Balance  Time: x5 min for ADL activity  Functional Mobility  Functional - Mobility Device: No device  Activity: To/from bathroom  Assist Level: Stand by assistance  Bed mobility  Supine to Sit: Modified independent(HOB elevated)  Sit to Supine: Modified independent  Transfers  Stand Pivot Transfers: Stand by assistance(no device)  Sit to stand: Stand by assistance  Stand to sit: Stand by assistance      Cognition  Overall Cognitive Status: WFL         Type of ROM/Therapeutic Exercise  Type of ROM/Therapeutic Exercise: AROM  Comment: seated  Exercises  Shoulder Flexion: 10x  Horizontal ABduction: 10x  Horizontal ADduction: 10x  Elbow Flexion: 10x  Elbow Extension: 10x  Grasp/Release: 10x          Plan   Plan  Times per week: 3-5x/wk  Current Treatment Recommendations: Balance Training, Functional Mobility Training, Endurance Training, Self-Care / ADL, Safety Education & Training  AM-PAC Score   AM-[facility] Inpatient Daily Activity Raw Score: 21 (04/12/21 1106)  AM-PAC Inpatient ADL T-Scale Score : 44.27 (04/12/21 1106)  ADL Inpatient CMS 0-100% Score: 32.79 (04/12/21 1106)  ADL Inpatient CMS G-Code Modifier : Rafael Burris (04/12/21 1106)  Goals  Short term goals  Time Frame for Short term goals: 1-2 weeks (4/18)  Short term goal 1: Pt will complete ADL t/fs with distant SPV by 4/17.-ongoing, SBA without device 4/12  Short term goal 2: Pt will complete toileting with SPV. -ongoing, SBA LE ADL tasks 4/12  Short term goal 3: Pt will complete UB/LB dressing with SPV. -ongoing, SBA 4/12  Short term goal 4: Pt will complete x3 sets of x15 reps BUE therex w/o SOB to increase endurance.-ongoing, 10 x each 4/12, limited by endurance  Patient Goals   Patient goals : Return home     Therapy Time   Individual Concurrent Group Co-treatment   Time In 0802         Time Out 0827         Minutes 25         Timed Code Treatment Minutes: 25 Minutes    If pt is discharged prior to next OT session, this note will serve as the discharge summary.   Phoebe Hardin OT

## 2021-04-12 NOTE — PLAN OF CARE
Problem: Nutrition  Goal: Optimal nutrition therapy  Outcome: Ongoing  Note: Nutrition Problem #1: Inadequate oral intake  Intervention: Food and/or Nutrient Delivery: Continue Current Diet, Start Oral Nutrition Supplement  Nutritional Goals: Pt will consume greater than 50% of meals and ONS this admission w/o further weight loss

## 2021-04-13 ENCOUNTER — APPOINTMENT (OUTPATIENT)
Dept: NUCLEAR MEDICINE | Age: 71
DRG: 190 | End: 2021-04-13
Payer: OTHER GOVERNMENT

## 2021-04-13 LAB
ANION GAP SERPL CALCULATED.3IONS-SCNC: 7 MMOL/L (ref 3–16)
BUN BLDV-MCNC: 57 MG/DL (ref 7–20)
CALCIUM SERPL-MCNC: 9.4 MG/DL (ref 8.3–10.6)
CHLORIDE BLD-SCNC: 99 MMOL/L (ref 99–110)
CO2: 30 MMOL/L (ref 21–32)
CREAT SERPL-MCNC: 2.4 MG/DL (ref 0.8–1.3)
GFR AFRICAN AMERICAN: 33
GFR NON-AFRICAN AMERICAN: 27
GLUCOSE BLD-MCNC: 142 MG/DL (ref 70–99)
MAGNESIUM: 2.2 MG/DL (ref 1.8–2.4)
POTASSIUM SERPL-SCNC: 4.9 MMOL/L (ref 3.5–5.1)
PRO-BNP: 255 PG/ML (ref 0–124)
SODIUM BLD-SCNC: 136 MMOL/L (ref 136–145)

## 2021-04-13 PROCEDURE — 94640 AIRWAY INHALATION TREATMENT: CPT

## 2021-04-13 PROCEDURE — 78452 HT MUSCLE IMAGE SPECT MULT: CPT

## 2021-04-13 PROCEDURE — 2580000003 HC RX 258: Performed by: HOSPITALIST

## 2021-04-13 PROCEDURE — 94150 VITAL CAPACITY TEST: CPT

## 2021-04-13 PROCEDURE — 97110 THERAPEUTIC EXERCISES: CPT

## 2021-04-13 PROCEDURE — 83880 ASSAY OF NATRIURETIC PEPTIDE: CPT

## 2021-04-13 PROCEDURE — 1200000000 HC SEMI PRIVATE

## 2021-04-13 PROCEDURE — 6360000002 HC RX W HCPCS: Performed by: HOSPITALIST

## 2021-04-13 PROCEDURE — 94761 N-INVAS EAR/PLS OXIMETRY MLT: CPT

## 2021-04-13 PROCEDURE — 80048 BASIC METABOLIC PNL TOTAL CA: CPT

## 2021-04-13 PROCEDURE — 6370000000 HC RX 637 (ALT 250 FOR IP): Performed by: INTERNAL MEDICINE

## 2021-04-13 PROCEDURE — 83735 ASSAY OF MAGNESIUM: CPT

## 2021-04-13 PROCEDURE — 3430000000 HC RX DIAGNOSTIC RADIOPHARMACEUTICAL: Performed by: INTERNAL MEDICINE

## 2021-04-13 PROCEDURE — 2700000000 HC OXYGEN THERAPY PER DAY

## 2021-04-13 PROCEDURE — 97116 GAIT TRAINING THERAPY: CPT

## 2021-04-13 PROCEDURE — 36415 COLL VENOUS BLD VENIPUNCTURE: CPT

## 2021-04-13 PROCEDURE — 94669 MECHANICAL CHEST WALL OSCILL: CPT

## 2021-04-13 PROCEDURE — A9502 TC99M TETROFOSMIN: HCPCS | Performed by: INTERNAL MEDICINE

## 2021-04-13 PROCEDURE — 6370000000 HC RX 637 (ALT 250 FOR IP): Performed by: HOSPITALIST

## 2021-04-13 PROCEDURE — 99232 SBSQ HOSP IP/OBS MODERATE 35: CPT | Performed by: NURSE PRACTITIONER

## 2021-04-13 RX ORDER — IPRATROPIUM BROMIDE AND ALBUTEROL SULFATE 2.5; .5 MG/3ML; MG/3ML
1 SOLUTION RESPIRATORY (INHALATION)
Status: DISCONTINUED | OUTPATIENT
Start: 2021-04-13 | End: 2021-04-19 | Stop reason: HOSPADM

## 2021-04-13 RX ADMIN — IPRATROPIUM BROMIDE AND ALBUTEROL SULFATE 1 AMPULE: .5; 2.5 SOLUTION RESPIRATORY (INHALATION) at 08:51

## 2021-04-13 RX ADMIN — ASPIRIN 81 MG: 81 TABLET, COATED ORAL at 09:30

## 2021-04-13 RX ADMIN — METHYLPREDNISOLONE SODIUM SUCCINATE 60 MG: 125 INJECTION, POWDER, FOR SOLUTION INTRAMUSCULAR; INTRAVENOUS at 09:30

## 2021-04-13 RX ADMIN — TETROFOSMIN 9.9 MILLICURIE: 1.38 INJECTION, POWDER, LYOPHILIZED, FOR SOLUTION INTRAVENOUS at 07:27

## 2021-04-13 RX ADMIN — IPRATROPIUM BROMIDE AND ALBUTEROL SULFATE 1 AMPULE: .5; 2.5 SOLUTION RESPIRATORY (INHALATION) at 12:48

## 2021-04-13 RX ADMIN — Medication 10 ML: at 21:31

## 2021-04-13 RX ADMIN — IPRATROPIUM BROMIDE AND ALBUTEROL SULFATE 1 AMPULE: .5; 3 SOLUTION RESPIRATORY (INHALATION) at 15:42

## 2021-04-13 RX ADMIN — PANTOPRAZOLE SODIUM 40 MG: 40 TABLET, DELAYED RELEASE ORAL at 09:30

## 2021-04-13 RX ADMIN — IPRATROPIUM BROMIDE AND ALBUTEROL SULFATE 1 AMPULE: .5; 3 SOLUTION RESPIRATORY (INHALATION) at 19:33

## 2021-04-13 RX ADMIN — ATORVASTATIN CALCIUM 80 MG: 80 TABLET, FILM COATED ORAL at 21:31

## 2021-04-13 RX ADMIN — RIVAROXABAN 20 MG: 20 TABLET, FILM COATED ORAL at 17:46

## 2021-04-13 RX ADMIN — Medication 10 ML: at 09:30

## 2021-04-13 RX ADMIN — METHYLPREDNISOLONE SODIUM SUCCINATE 60 MG: 125 INJECTION, POWDER, FOR SOLUTION INTRAMUSCULAR; INTRAVENOUS at 21:31

## 2021-04-13 RX ADMIN — METOPROLOL TARTRATE 12.5 MG: 25 TABLET, FILM COATED ORAL at 09:30

## 2021-04-13 ASSESSMENT — PAIN SCALES - GENERAL
PAINLEVEL_OUTOF10: 0
PAINLEVEL_OUTOF10: 0

## 2021-04-13 NOTE — PROGRESS NOTES
droop  Psychiatric: Alert and oriented, thought content appropriate, normal insight      Labs:   Recent Labs     04/10/21  1405 04/11/21  0900 04/12/21  0603   WBC 9.9 13.1* 15.8*   HGB 15.5 14.3 13.3*   HCT 45.8 42.9 40.4*    229 230     Recent Labs     04/11/21  0900 04/12/21  0603 04/13/21  0544   * 134* 136   K 4.4 4.5 4.9   CL 99 96* 99   CO2 28 29 30   BUN 25* 48* 57*   CREATININE 1.7* 2.6* 2.4*   CALCIUM 9.5 9.2 9.4     Recent Labs     04/10/21  1405   AST 13*   ALT 13   BILITOT 0.7   ALKPHOS 97     Recent Labs     04/10/21  1405   INR 1.41*     Recent Labs     04/10/21  1405 04/10/21  2128   TROPONINI 0.02* <0.01       Urinalysis:      Lab Results   Component Value Date    NITRU Negative 04/07/2014    WBCUA None seen 04/07/2014    BACTERIA 2+ 04/07/2014    RBCUA 3-5 04/07/2014    BLOODU TRACE-LYSED 04/07/2014    SPECGRAV 1.025 04/07/2014    GLUCOSEU Negative 04/07/2014    GLUCOSEU NEGATIVE 03/27/2012       Radiology:  CT CARDIAC CALCIUM SCORING   Final Result   Total Agatston calcium score of 207 indicates moderate plaque burden as   described below. Patient in the 40th percentile for age. No incidental clinically important extracardiac CT findings. Calcium Score Interpretation      0  No identifiable atherosclerotic plaque. Very low cardiovascular disease   risk. Less than 5% chance of presence of coronary artery disease. A   negative examination. 1-10 Minimal plaque burden. Significant coronary artery disease very   unlikely.  Mild plaque burden. Likely mild or minimal coronary stenosis. 101-400 Moderate plaque burden. Moderate non-obstructive coronary artery   disease highly likely. Over 400 Extensive plaque burden. High likelihood of at least one   significant coronary artery stenosis (>50% diameter). CALCIUM SCORING OVERVIEW:      Coronary calcium is a marker for plaque in a blood vessel or atherosclerosis   (hardening of the arteries).   The presence and amount of calcium detected in   the coronary artery by the CT scan estimates the presence and amount of   atherosclerotic plaque. These calcium deposits can appear years before the   development of heart disease symptoms such as chest pain and shortness of   breath. A calcium score is computed for each of the coronary arteries based upon the   volume and density of the calcium deposits. This can be referred to as your   calcified plaque burden. It does not correspond directly to the percentage   of narrowing in the artery, but does correlate with the severity of the   overall coronary atherosclerotic burden. This score is then used to determine the calcium percentile which compares   your calcified plaque burden to that of other asymptomatic men and women of   the same age. The calcium score, in combination with the percentile, enables   your physician to determine your risk of developing symptomatic coronary   artery disease, and to measure the progression of disease as well as the   effectiveness of treatment. A score of zero indicates that there is no calcified plaque burden. This   implies that there is no significant coronary artery narrowing and very low   likelihood of a cardiac event over at least the next 3 years. It does not   absolutely rule out the presence of soft, noncalcified plaque or totally   eliminate the possibility of a cardiac event. A score greater than zero indicates at least some coronary artery disease. As the score increases, so does the likelihood of a significant coronary   narrowing and the likelihood of a coronary event over the next 3 years. Similarly, the likelihood of a coronary event increases with increasing   calcium percentiles. CT CHEST PULMONARY EMBOLISM W CONTRAST   Final Result   1. No evidence of pulmonary embolic disease. 2. No acute pulmonary findings. Pulmonary emphysema.    3. Stable mass in the thoracic inlet on the right posterior to the thyroid   gland. 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         XR CHEST PORTABLE   Final Result   1. No acute abnormality. NM Cardiac Stress Test Nuclear Imaging    (Results Pending)     Assessment/Plan:    Active Hospital Problems    Diagnosis    PAF (paroxysmal atrial fibrillation) (HCC) [I48.0]    COPD exacerbation (HCC) [J44.1]    History of pulmonary embolus (PE) [Z86.711]    Hx of deep venous thrombosis [Z86.718]    Prostate cancer ([facility] [address] [C61]    Essential hypertension [I10]     COPD exacerbation: Continue steroids, bronchodilators, Acapella. SOB: Likely due to above. CXR non acute, CTA chest neg for acute PE. Pro BNP elevated but no pulm edema noted on CT, not volume overloaded on exam. Diuretics held. S/p recent COVID vaccine on 4/4/2021. Supportive care for COPD as stated above     Elevated trop with abnormal EKG: concerning for ACS. Trop downtrended. Report recent stress test at [place] about 6 months ago which was abnormal.  Pt denied any chest pain. Cardio consulted. Continue aspirin, statin, xarelto, BB. Plan to complete 2nd part of stress test tomorrow. QUITA: Nephrology consulted. Hold Losartan. BP regimen adjusted. IVF hydration. Improving. Hx of A fib : rate controlled.  Continue xarelto, BB       DVT Prophylaxis: xarelto  Diet: Diet NPO Time Specified  DIET GENERAL; No Caffeine  Code Status: Full Code    PT/OT Eval Status: not indicated at this time     Dispo - 2 days    Angie Briscoe MD

## 2021-04-13 NOTE — PROGRESS NOTES
Patient arrived to stress lab. Upon assessment patient wheezing. Respiratory came to department to administer breathing treatment. Spoke with Tammie Gandhi NP and stated to hold off on stress test today. Nuclear resting images completed. Will attempt stress portion tomorrow. Spoke with Sveta Trevizo and updated on plan of care.

## 2021-04-13 NOTE — PLAN OF CARE
Problem: Falls - Risk of:  Goal: Will remain free from falls  Description: Will remain free from falls  Outcome: Ongoing  Note: Pt will remain free from falls throughout hospital stay. Fall precautions in place, bed alarm on, bed in lowest position with wheels locked and side rails 2/4 up. Room door open and hourly rounding completed. Will continue to monitor throughout shift. Problem: Pain:  Goal: Pain level will decrease  Description: Pain level will decrease  Outcome: Ongoing  Note: Pt will be satisfied with pain control. Pt uses numeric pain rating scale with reassessments after pain med administration. Patient denies pain at this time. Will continue to monitor progression throughout shift. Problem: Activity Intolerance:  Goal: Ability to tolerate increased activity will improve  Description: Ability to tolerate increased activity will improve  Outcome: Ongoing  Note: Patient will have decreased shortness of breath and oxygen needs at time of discharge. Patient is on IV steroids and breathing treatments. On 4 L nasal cannula oxygen at this time, will wean as tolerated.

## 2021-04-13 NOTE — PROGRESS NOTES
Physical Therapy  Facility/Department: [facility] A2 CARD TELEMETRY  Daily Treatment Note  NAME: Nina Anand  : 1950  MRN: [id number]    Date of Service: 2021    Discharge Recommendations:  24 hour supervision or assist, Home with Home health PT   PT Equipment Recommendations  Equipment Needed: Yes  Other: evaluate need for a rw for ambulation. If pt is unable to be seen after this session, please let this note serve as discharge summary. Please see case management note for discharge disposition. Thank you. Assessment   Body structures, Functions, Activity limitations: Decreased functional mobility ; Decreased endurance;Decreased strength;Decreased balance  Assessment: Pt is requiring supervision for transfers and SBA for gait when using a rw. Pt has been limited by decreased endurance associated with SOB and low BP and will benefit from PT tx to continue to progress strength and mobility and for stair training. Recommending home d/c with initial [facility] hour [facility]. Pt may benefit from a rw. Treatment Diagnosis: Decreased activity tolerance associated with SOB  Prognosis: Good  Decision Making: Medium Complexity  Barriers to Learning: none  REQUIRES PT FOLLOW UP: Yes  Activity Tolerance  Activity Tolerance: Patient Tolerated treatment well;Patient limited by endurance  Activity Tolerance: /70  67 HR  93% on RA     Patient Diagnosis(es): The encounter diagnosis was Shortness of breath. has a past medical history of Abdominal Pain, Anxiety State, BPH (benign prostatic hyperplasia), Cancer ([address] Chest pain, Colon polyp, DVT, lower extremity ([address] Hernia, HYPERTENSION, PE (pulmonary embolism), Prostatitis, Hx of, and SBO (small bowel obstruction) ([address] has a past surgical history that includes hernia repair; Prostate biopsy;  Tonsillectomy; other surgical history (2014); and Inguinal hernia repair (Right, 1/23/14). Restrictions  Restrictions/Precautions  Restrictions/Precautions: General Precautions, Fall Risk  Position Activity Restriction  Other position/activity restrictions: up with assist, tele, 3LO2  Subjective   General  Chart Reviewed: Yes  Additional Pertinent Hx: H/o COPD and CHF  Response To Previous Treatment: Patient reporting fatigue but able to participate. Family / Caregiver Present: No  Referring Practitioner: Dr. Ilda Shone  Subjective  Subjective: Pt presents in supine and agreeable to tx.   General Comment  Comments: Joe Schwarz RN cleared  Pain Screening  Patient Currently in Pain: Denies  Vital Signs  Patient Currently in Pain: Denies       Orientation  Orientation  Overall Orientation Status: Within Normal Limits  Cognition      Objective   Bed mobility  Supine to Sit: Modified independent  Transfers  Sit to Stand: Supervision  Stand to sit: Supervision  Ambulation  Ambulation?: Yes  Ambulation 1  Surface: level tile  Device: Rolling Walker  Assistance: Stand by assistance  Quality of Gait: steady with use of rw vs NAD w/ no LOB, narrow HERBERT  Distance: 150'  Comments: Pt limited by fatigue  Stairs/Curb  Stairs?: No     Balance  Sitting - Static: Good  Sitting - Dynamic: Good  Standing - Static: Fair;+  Standing - Dynamic: Fair;+(w/ walker)  Exercises  Hip Flexion: BLE x15  Hip Abduction: BLE x15  Knee Long Arc Quad: BLE x15             AM-PAC Score  AM-PAC Inpatient Mobility Raw Score : 20 (04/12/21 1709)  AM-PAC Inpatient T-Scale Score : 47.67 (04/12/21 1709)  Mobility Inpatient CMS 0-100% Score: 35.83 (04/12/21 1709)  Mobility Inpatient CMS G-Code Modifier : CJ (04/12/21 1709)          Goals  Short term goals  Time Frame for Short term goals: 1 week (4/18) (unless otherwise specified)  Short term goal 1: Pt will perform bed mobility independently without SOB -4/13 mod indep  Short term goal 2: Pt will perform t/f independently -4/13 superv  Short term goal 3: Pt will ambulate 150' independently without SOB -4/12 80' NAD cga/min  -4/13 rw 150' sba  Short term goal 4: Pt will climb 24 steps with SBA-supervision without exacerbation of SOB  -4/13 NT  Short term goal 5: 4/15 Pt will be independent in home exercise program  -4/13  initiated  Patient Goals   Patient goals : \"To get better at breathing\"    Plan    Plan  Times per week: 3-5x/week  Times per day: Daily  Current Treatment Recommendations: Balance Training, Stair training, Functional Mobility Training, Home Exercise Program, Patient/Caregiver Education & Training, Strengthening, Endurance Training, Safety Education & Training, Transfer Training, Gait Training  Plan Comment: Progress functional mobility as tolerated  Safety Devices  Type of devices:  All fall risk precautions in place, Call light within reach, Nurse notified, Gait belt, Patient at risk for falls, Chair alarm in place, Left in chair     Therapy Time   Individual Concurrent Group Co-treatment   Time In 1300         Time Out 1325         Minutes 25         Timed Code Treatment Minutes: 1440 [facility]

## 2021-04-13 NOTE — PROGRESS NOTES
21  0900 21  0603 21  0544   * 134* 136   K 4.4 4.5 4.9   CO2 28 29 30   BUN 25* 48* 57*   CREATININE 1.7* 2.6* 2.4*     INR:    Recent Labs     04/10/21  1405   INR 1.41*     BNP:    Recent Labs     04/10/21  1405 21  0544   PROBNP 1,062* 255*     Lab Results   Component Value Date    LVEF 58 2017     Testing:  Echo:  2017   Left ventricular systolic function is normal with an ejection fraction of   55-60%. Septal bounce noted. Grade I diastolic dysfunction with normal filling pressure. Systolic pulmonary artery pressure (SPAP) is normal and estimated at 36 mmHg   (RA pressure 3 mmHg). Compared to previous study from 2014 ejection fraction remains   unchanged. Echo 21  Summary   Technically difficult examination. Normal left ventricular systolic function with ejection fraction of 55-60%. No regional wall motion abnormalites are seen. Moderate RV enlargement. Normal left ventricular size with mild concentric left ventricular   hypertrophy. Grade I diastolic dysfunction with normal filling pressure. Compared to previous study from 3- no changes noted in left   ventricular function. CT calcium score 21  FINDINGS:   There is some limitation due to respiratory motion.       LEFT MAIN: Zero (0).     RIGHT CORONARY ARTERY: 70       LEFT ANTERIOR DESCENDIN       CIRCUMFLEX: 8       TOTAL AGATSTON CALCIUM SCORE: 207       EXTRACARDIAC STRUCTURES: No evidence of mediastinal or hilar lymphadenopathy.    No pericardial effusion.  No cardiomegaly.  No evidence of acute process in   the lungs.  There is diffuse centrilobular emphysema, upper lungs not   entirely included.  No noncalcified nodules are noted in the lungs.  Limited   images of the upper abdomen are grossly unremarkable.  Visualized osseous   structures demonstrate age related degenerative changes without acute   abnormality.  Each of these findings are better seen on recent complete CT   chest, 04/10/2021. Active Problems:    Essential hypertension    Prostate cancer (HCC)    Hx of deep venous thrombosis    COPD exacerbation (HCC)    History of pulmonary embolus (PE)    PAF (paroxysmal atrial fibrillation) (HCC)  Resolved Problems:    * No resolved hospital problems.  *    Assessment:  Shortness of breath- likely multifactorial, suspect COPD exacerbation   Elevated troponin- mild  Afib- chronic   HTN  HLD  Hx of DVT  COPD  QUITA  Hx of prostate cancer/ urinary incontinence     Plan:  Off of losartan due to QUITA- appreciate nephrology assistance   D/C nitro paste due to hypotension  D/c metoprolol for now   Continue aspirin and statin   Stress testing pending     Discussed with nursing    Qamar Cardoso CNP, 4/13/2021, 10:46 AM

## 2021-04-13 NOTE — CARE COORDINATION
CASE MANAGEMENT INITIAL ASSESSMENT      Reviewed chart and completed assessment at bedside with patient    Explained Case Management role/services. yes    Health Care Decision Maker :   Primary Decision Maker: Gui Vasques - Child - [phone]    Secondary Decision Maker: Simeon Ledbetter Child - [phone]        Can this person be reached and be able to respond quickly, such as within a few minutes or hours? Yes    Admit date/status: inpatient 4/10/21  Diagnosis: decompensated heart failure     Is this a Readmission?:  No      Insurance: [address] [place] [address] required for SNF: Yes       3 night stay required: No    Living arrangements, Adls, care needs, prior to admission: lives alone in 3rd apt. 21 steps to enter. No elevator. Active     Transportation: private/family     Durable Medical Equipment at home: none    Services in the home and/or outpatient, prior to admission: none    PT/OT recs: home w/24/7 assist and [address] Notification (HEN): n/a    Barriers to discharge: none    Plan/comments: pt denies wanting/needing home care. States he can stay with his daughter temporarily if absolutely needed. Pt is open to home oxygen if needed. No preference for agency.       José Luis Malik RN

## 2021-04-13 NOTE — PROGRESS NOTES
am- 5 pm via Perfect serve or cell phone  Dr.Sudhir Tabby Feliz          CC/reason for consult :     QUITA     HPI :     Jj Henry is a 72 y.o. male presented to   the hospital on 4/10/2021 with shortness of breath for several weeks. If any has shortness of breath for some time which got worse after COVID-19 vaccine. He is known to have COPD but not on oxygen dependence. He also has a history of smoking up until several weeks before this hospitalization. He came to the emergency room where an EKG was done which showed possible ST segment depression. His troponin was slightly up BNP was up at 1000. He has had chest x-ray done which showed no acute disease. He had a CT PE done which was negative for thromboembolism. Seen by cardiology and plan for possible angiogram this hospitalization  In the meantime we are consulted for QUITA and related issues    ROS:     Seen with- RN     positives in bold   Constitutional:  fever, chills, weakness, weight change, fatigue  Skin:  rash, pruritus, hair loss, bruising, dry skin, petechiae  Head, Face, Neck   headaches, swelling,  cervical adenopathy  Respiratory: shortness of breath, cough, or wheezing  Cardiovascular: chest pain, palpitations, dizzy, edema  Gastrointestinal: nausea, vomiting, diarrhea, constipation,belly pain    Yellow skin, blood in stool  Musculoskeletal:  back pain, muscle weakness, gait problems,       joint pain or swelling. Genitourinary:  dysuria, poor urine flow, flank pain, blood in urine  Neurologic:  vertigo, TIA'S, syncope, seizures, focal weakness  Psychosocial:  insomnia, anxiety, or depression.   Additional positive findings: SOB and wheeze much better than on admission                   All other remaining systems are negative or unable to obtain        PMH/PSH/SH/Family History:     Past Medical History:   Diagnosis Date    Abdominal Pain     Anxiety State     BPH (benign prostatic hyperplasia) 10/3/2013    Cancer [facility] [address] 2014 prostate    Chest pain     Colon polyp 3/27/2012    DVT, lower extremity ([address]     Left Leg    Hernia 1/24/2014    HYPERTENSION     PE (pulmonary embolism) 2/7/2014    Pt denies    Prostatitis, Hx of     SBO (small bowel obstruction) ([facility] [address] 1/24/2014       Past Surgical History:   Procedure Laterality Date    HERNIA REPAIR      INGUINAL HERNIA REPAIR Right 1/23/14    incarcerated right inguinal hernia repair    OTHER SURGICAL HISTORY  1/16/2014    ROBOTIC ASSISTED LAPAROSCOPIC PROSTATECTOMY    PROSTATE BIOPSY      TONSILLECTOMY          reports that he quit smoking about 4 years ago. He smoked 0.00 packs per day for 44.00 years. He has never used smokeless tobacco. He reports current alcohol use. He reports that he does not use drugs. family history includes Cancer in his brother, brother, father, mother, and sister.          Medication:     Current Facility-Administered Medications: aspirin EC tablet 81 mg, 81 mg, Oral, Daily  regadenoson (LEXISCAN) injection 0.4 mg, 0.4 mg, Intravenous, ONCE PRN  methylPREDNISolone sodium (SOLU-MEDROL) injection 60 mg, 60 mg, Intravenous, Q12H  perflutren lipid microspheres (DEFINITY) injection 1.65 mg, 1.5 mL, Intravenous, ONCE PRN  sodium chloride flush 0.9 % injection 10 mL, 10 mL, Intravenous, 2 times per day  sodium chloride flush 0.9 % injection 10 mL, 10 mL, Intravenous, PRN  0.9 % sodium chloride infusion, 25 mL, Intravenous, PRN  senna (SENOKOT) tablet 8.6 mg, 1 tablet, Oral, Daily PRN  acetaminophen (TYLENOL) tablet 650 mg, 650 mg, Oral, Q6H PRN **OR** acetaminophen (TYLENOL) suppository 650 mg, 650 mg, Rectal, Q6H PRN  potassium chloride (KLOR-CON M) extended release tablet 40 mEq, 40 mEq, Oral, PRN **OR** potassium bicarb-citric acid (EFFER-K) effervescent tablet 40 mEq, 40 mEq, Oral, PRN **OR** potassium chloride 10 mEq/100 mL IVPB (Peripheral Line), 10 mEq, Intravenous, PRN  magnesium sulfate 2000 mg in 50 mL IVPB premix, 2,000 mg, Intravenous, PRN  promethazine (PHENERGAN) tablet 12.5 mg, 12.5 mg, Oral, Q6H PRN **OR** [DISCONTINUED] ondansetron (ZOFRAN) injection 4 mg, 4 mg, Intravenous, Q6H PRN  pantoprazole (PROTONIX) tablet 40 mg, 40 mg, Oral, QAM AC  atorvastatin (LIPITOR) tablet 80 mg, 80 mg, Oral, Nightly  metoprolol tartrate (LOPRESSOR) tablet 12.5 mg, 12.5 mg, Oral, BID  rivaroxaban (XARELTO) tablet 20 mg, 20 mg, Oral, Dinner  ipratropium-albuterol (DUONEB) nebulizer solution 1 ampule, 1 ampule, Inhalation, Q4H  ipratropium-albuterol (DUONEB) nebulizer solution 1 ampule, 1 ampule, Inhalation, Q4H PRN       Vitals :     Vitals:    04/13/21 0925   BP:    Pulse:    Resp:    Temp:    SpO2: 93%       I & O :       Intake/Output Summary (Last 24 hours) at 4/13/2021 1014  Last data filed at 4/13/2021 0416  Gross per 24 hour   Intake 1080 ml   Output 575 ml   Net 505 ml        Physical Examination :     General appearance: Anxious- no, distressed- no, in good spirits- yes  HEENT: Lips- normal, teeth- ok , oral mucosa- moist  Neck : Mass- no, appears symmetrical, JVD- not visible  Respiratory: Respiratory effort-comfortable on oxygen, wheeze-yes, crackles -no  Cardiovascular:  Ausculation- No M/R/G, Edema no  Abdomen: visible mass- no, distention- no, scar- no, tenderness- no                            hepatosplenomegaly-  no  Musculoskeletal:  clubbing no,cyanosis- no , digital ischemia- no                           muscle strength- grossly normal , tone - grossly normal  Skin: rashes- no , ulcers- no, induration- no, tightening - no  Psychiatric:  Judgement and insight- normal           AAO X 3  Additional finding: -      LABS:     Recent Labs     04/10/21  1405 04/11/21  0900 04/12/21  0603   WBC 9.9 13.1* 15.8*   HGB 15.5 14.3 13.3*   HCT 45.8 42.9 40.4*    229 230     Recent Labs     04/11/21  0900 04/12/21  0603 04/13/21  0544   * 134* 136   K 4.4 4.5 4.9   CL 99 96* 99   CO2 28 29 30   BUN 25* 48* 57*   CREATININE 1.7* 2.6* 2.4* GLUCOSE 140* 149* 142*   MG 2.00 2.10 2.20

## 2021-04-13 NOTE — PLAN OF CARE
Problem: OXYGENATION/RESPIRATORY FUNCTION  Goal: Patient will maintain patent airway  4/13/2021 0955 by Elizabeth Garza RN  Outcome: Ongoing  Note:   Patient's EF (Ejection Fraction) is greater than 40%    Heart Failure Medications:  Diuretics[de-identified] None    (One of the following REQUIRED for EF <40%/SYSTOLIC FAILURE but MAY be used in EF% >40%/DIASTOLIC FAILURE)        ACE[de-identified] None        ARB[de-identified] None         ARNI[de-identified] None    (Beta Blockers)  NON- Evidenced Based Beta Blocker (for EF% >40%/DIASTOLIC FAILURE): Metoprolol TARTrate- Lopressor    Evidenced Based Beta Blocker::(REQUIRED for EF% <40%/SYSTOLIC FAILURE) None  . .................................................................................................................................................. Patient's weights and intake/output reviewed: Yes    Patient's Last Weight: 200 lbs obtained by standing scale. Difference of 1 lbs more than last documented weight. Intake/Output Summary (Last 24 hours) at 4/13/2021 0956  Last data filed at 4/13/2021 0416  Gross per 24 hour   Intake 1080 ml   Output 575 ml   Net 505 ml       Comorbidities Reviewed Yes    Patient has a past medical history of Abdominal Pain, Anxiety State, BPH (benign prostatic hyperplasia), Cancer ([address] Chest pain, Colon polyp, DVT, lower extremity ([address] Hernia, HYPERTENSION, PE (pulmonary embolism), Prostatitis, Hx of, and SBO (small bowel obstruction) ([address] >>For CHF and Comorbidity documentation on Education Time and Topics, please see Education Tab    Progressive Mobility Assessment:  What is this patient's Current Level of Mobility?: Ambulatory- with Assistance  How was this patient Mobilized today?: Edge of Bed and Up in Room                 With Whom? Nurse                 Level of Difficulty/Assistance: 1x Assist     Pt resting in bed at this time on  4 L O2. Pt with complaints of shortness of breath. Pt without lower extremity edema.      Patient and/or Family's stated Goal of Care this Admission: reduce shortness of breath, increase activity tolerance, better understand heart failure and disease management, and be more comfortable prior to discharge    . Pt denies shortness of breath. Pt without lower extremity edema.      Patient and/or Family's stated Goal of Care this Admission: reduce shortness of breath, increase activity tolerance, be more comfortable, and reduce lower extremity edema prior to discharge        :

## 2021-04-13 NOTE — PROGRESS NOTES
Neurologic:  Alert speech clear with no overt facial droop  Psychiatric: Alert and oriented, thought content appropriate, normal insight      Labs:   Recent Labs     04/10/21  1405 04/11/21  0900 04/12/21  0603   WBC 9.9 13.1* 15.8*   HGB 15.5 14.3 13.3*   HCT 45.8 42.9 40.4*    229 230     Recent Labs     04/10/21  1405 04/11/21  0900 04/12/21  0603    135* 134*   K 4.4 4.4 4.5    99 96*   CO2 30 28 29   BUN 15 25* 48*   CREATININE 1.2 1.7* 2.6*   CALCIUM 9.7 9.5 9.2     Recent Labs     04/10/21  1405   AST 13*   ALT 13   BILITOT 0.7   ALKPHOS 97     Recent Labs     04/10/21  1405   INR 1.41*     Recent Labs     04/10/21  1405 04/10/21  2128   TROPONINI 0.02* <0.01       Urinalysis:      Lab Results   Component Value Date    NITRU Negative 04/07/2014    WBCUA None seen 04/07/2014    BACTERIA 2+ 04/07/2014    RBCUA 3-5 04/07/2014    BLOODU TRACE-LYSED 04/07/2014    SPECGRAV 1.025 04/07/2014    GLUCOSEU Negative 04/07/2014    GLUCOSEU NEGATIVE 03/27/2012       Radiology:  CT CARDIAC CALCIUM SCORING   Final Result   Total Agatston calcium score of 207 indicates moderate plaque burden as   described below. Patient in the 40th percentile for age. No incidental clinically important extracardiac CT findings. Calcium Score Interpretation      0  No identifiable atherosclerotic plaque. Very low cardiovascular disease   risk. Less than 5% chance of presence of coronary artery disease. A   negative examination. 1-10 Minimal plaque burden. Significant coronary artery disease very   unlikely.  Mild plaque burden. Likely mild or minimal coronary stenosis. 101-400 Moderate plaque burden. Moderate non-obstructive coronary artery   disease highly likely. Over 400 Extensive plaque burden. High likelihood of at least one   significant coronary artery stenosis (>50% diameter).       CALCIUM SCORING OVERVIEW:      Coronary calcium is a marker for plaque in a blood vessel emphysema. 3. Stable mass in the thoracic inlet on the right posterior to the thyroid   gland. 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         XR CHEST PORTABLE   Final Result   1. No acute abnormality. NM Cardiac Stress Test Nuclear Imaging    (Results Pending)           Assessment/Plan:    Active Hospital Problems    Diagnosis    PAF (paroxysmal atrial fibrillation) ([facility]) [I48.0]    COPD exacerbation ([facility]) [J44.1]    History of pulmonary embolus (PE) [Z86.711]    Hx of deep venous thrombosis [Z86.718]    Prostate cancer ([facility] [C61]    Essential hypertension [I10]     COPD exacerbation: Continue steroids, bronchodilators. May benefit from Acapella. SOB: Likely due to above. CXR non acute, CTA chest neg for acute PE. Pro BNP elevated but no pulm edema noted on CT, not volume overloaded on exam. Diuretics held. S/p recent COVID vaccine on 4/4/2021. Supportive care for COPD as stated above     Elevated trop with abnormal EKG: concerning for ACS. Trop downtrended. Report recent stress test at [place] about 6 months ago which was abnormal.  Pt denied any chest pain. Cardio consulted. Continue aspirin, statin, xarelto, BB. Plan to complete stress test tomorrow. QUITA: Nephrology consulted. Hold Losartan. BP regimen adjusted. IVF hydration. Hx of A fib : rate controlled. Continue xarelto, BB       DVT Prophylaxis: xarelto  Diet: DIET CARDIAC; Low Sodium (2 GM);  No Caffeine  Diet NPO, After Midnight  Dietary Nutrition Supplements: Standard High Calorie Oral Supplement  Code Status: Full Code    PT/OT Eval Status: not indicated at this time     Dispo - 2-3 days    Angie Velez MD

## 2021-04-13 NOTE — PROGRESS NOTES
Activity: Walking with assistance = 1    Respiratory Pattern: Regular Pattern; RR 8-20 = 0    Breath Sounds: Absent bilaterally and/or with wheezes = 3    Sputum   ,  ,    Cough: Strong, spontaneous, non-productive = 0    Vital Signs   /70   Pulse 67   Temp 97.9 °F (36.6 °C) (Oral)   Resp 19   Ht 6' 5\" (1.956 m)   Wt 200 lb 3.2 oz (90.8 kg)   SpO2 96%   BMI 23.74 kg/m²   SPO2 (COPD values may differ): 88-89% on room air or greater than 92% on FiO2 28- 35% = 2    Peak Flow (asthma only): not applicable = 0    RSI: 7-8 = BID and Q4HPRN (every four hours as needed) for dyspnea        Plan       Goals: medication delivery, volume expansion and improve oxygenation    Patient/caregiver was educated on the proper method of use for Respiratory Care Devices:  Yes      Level of patient/caregiver understanding able to:   ? Verbalize understanding   ? Demonstrate understanding       ? Teach back        ? Needs reinforcement       ? No available caregiver               ? Other:     Response to education:  Good     Is patient being placed on Home Treatment Regimen? No     Does the patient have everything they need prior to discharge? NA     Comments: pt assessed, chart and home medications reviewed    Plan of Care: pt wheezing, requiring oxygen, keeping pt on q4h wa hhn tx    Electronically signed by Octavia Sever, RCP on 4/13/2021 at 2:38 PM    Respiratory Protocol Guidelines     1. Assessment and treatment by Respiratory Therapy will be initiated for medication and therapeutic interventions upon initiation of aerosolized medication. 2. Physician will be contacted for respiratory rate (RR) greater than 35 breaths per minute. Therapy will be held for heart rate (HR) greater than 140 beats per minute, pending direction from physician. 3. Bronchodilators will be administered via Metered Dose Inhaler (MDI) with spacer when the following criteria are met:  a.  Alert and cooperative     b. HR < 140 bpm  c. RR < 30 bpm                d. Can demonstrate a 2-3 second inspiratory hold  4. Bronchodilators will be administered via Hand Held Nebulizer MAICOL [facility]) to patients when ANY of the following criteria are met  a. Incognizant or uncooperative          b. Patients treated with HHN at Home        c. Unable to demonstrate proper use of MDI with spacer     d. RR > 30 bpm   5. Bronchodilators will be delivered via Metered Dose Inhaler (MDI), HHN, Aerogen to intubated patients on mechanical ventilation. 6. Inhalation medication orders will be delivered and/or substituted as outlined below. Aerosolized Medications Ordering and Administration Guidelines:    1. All Medications will be ordered by a physician, and their frequency and/or modality will be adjusted as defined by the patients Respiratory Severity Index (RSI) score. 2. If the patient does not have documented COPD, consider discontinuing anticholinergics when RSI is less than 9.  3. If the bronchospasm worsens (increased RSI), then the bronchodilator frequency can be increased to a maximum of every 4 hours. If greater than every 4 hours is required, the physician will be contacted. 4. If the bronchospasm improves, the frequency of the bronchodilator can be decreased, based on the patient's RSI, but not less than home treatment regimen frequency. 5. Bronchodilator(s) will be discontinued if patient has a RSI less than 9 and has received no scheduled or as needed treatment for 72  Hrs. Patients Ordered on a Mucolytic Agent:    1. Must always be administered with a bronchodilator. 2. Discontinue if patient experiences worsened bronchospasm, or secretions have lessened to the point that the patient is able to clear them with a cough. Anti-inflammatory and Combination Medications:    1.  If the patient lacks prior history of lung disease, is not using inhaled anti-inflammatory medication at home, and lacks wheezing by examination or by history for at least 24 hours,

## 2021-04-13 NOTE — PLAN OF CARE
Problem: OXYGENATION/RESPIRATORY FUNCTION  Goal: Patient will maintain patent airway  Outcome: Ongoing  Note:   Patient's EF (Ejection Fraction) is greater than 40%    Heart Failure Medications:  Diuretics[de-identified] None    (One of the following REQUIRED for EF <40%/SYSTOLIC FAILURE but MAY be used in EF% >40%/DIASTOLIC FAILURE)        ACE[de-identified] None        ARB[de-identified] Losartan         ARNI[de-identified] None    (Beta Blockers)  NON- Evidenced Based Beta Blocker (for EF% >40%/DIASTOLIC FAILURE): Metoprolol TARTrate- Lopressor    Evidenced Based Beta Blocker::(REQUIRED for EF% <40%/SYSTOLIC FAILURE) None  . .................................................................................................................................................. Patient's weights and intake/output reviewed: Yes    Patient's Last Weight: 199 lbs obtained by standing scale. Difference of 1 lbs more than last documented weight. Intake/Output Summary (Last 24 hours) at 4/13/2021 0039  Last data filed at 4/12/2021 2049  Gross per 24 hour   Intake 1080 ml   Output 200 ml   Net 880 ml       Comorbidities Reviewed Yes    Patient has a past medical history of Abdominal Pain, Anxiety State, BPH (benign prostatic hyperplasia), Cancer ([address] Chest pain, Colon polyp, DVT, lower extremity ([address] Hernia, HYPERTENSION, PE (pulmonary embolism), Prostatitis, Hx of, and SBO (small bowel obstruction) ([address] >>For CHF and Comorbidity documentation on Education Time and Topics, please see Education Tab    Progressive Mobility Assessment:  What is this patient's Current Level of Mobility?: Ambulatory-Up Ad Misty  How was this patient Mobilized today?: Edge of Bed, Up to Chair,  Up to Toilet/Shower, and Up in Room                 With Whom? Self                 Level of Difficulty/Assistance: Independent     Pt resting in bed at this time on  2 L O2. Pt denies shortness of breath. Pt without lower extremity edema.      Patient and/or Family's stated Goal of Care this Admission: reduce shortness of breath, increase activity tolerance, be more comfortable, and reduce lower extremity edema prior to discharge        :

## 2021-04-13 NOTE — PROGRESS NOTES
Occupational Therapy/Physical Therapy  Attempted OT/PT this am. Pt is off floor for stress test. Cont OT/PT.   Phoebe Hardin OT  Jeremiah Jury PTA

## 2021-04-14 ENCOUNTER — APPOINTMENT (OUTPATIENT)
Dept: NUCLEAR MEDICINE | Age: 71
DRG: 190 | End: 2021-04-14
Payer: OTHER GOVERNMENT

## 2021-04-14 LAB
AMORPHOUS: ABNORMAL /HPF
ANION GAP SERPL CALCULATED.3IONS-SCNC: 7 MMOL/L (ref 3–16)
BACTERIA: ABNORMAL /HPF
BILIRUBIN URINE: NEGATIVE
BLOOD, URINE: NEGATIVE
BUN BLDV-MCNC: 49 MG/DL (ref 7–20)
CALCIUM SERPL-MCNC: 9.1 MG/DL (ref 8.3–10.6)
CHLORIDE BLD-SCNC: 100 MMOL/L (ref 99–110)
CLARITY: CLEAR
CO2: 30 MMOL/L (ref 21–32)
COLOR: YELLOW
CREAT SERPL-MCNC: 1.9 MG/DL (ref 0.8–1.3)
EPITHELIAL CELLS, UA: ABNORMAL /HPF (ref 0–5)
GFR AFRICAN AMERICAN: 43
GFR NON-AFRICAN AMERICAN: 35
GLUCOSE BLD-MCNC: 151 MG/DL (ref 70–99)
GLUCOSE URINE: NEGATIVE MG/DL
KETONES, URINE: NEGATIVE MG/DL
LEUKOCYTE ESTERASE, URINE: NEGATIVE
LV EF: 60 %
LVEF MODALITY: NORMAL
MAGNESIUM: 2.2 MG/DL (ref 1.8–2.4)
MICROSCOPIC EXAMINATION: YES
MUCUS: ABNORMAL /LPF
NITRITE, URINE: NEGATIVE
PH UA: 5.5 (ref 5–8)
POTASSIUM SERPL-SCNC: 5.2 MMOL/L (ref 3.5–5.1)
PROTEIN UA: ABNORMAL MG/DL
RBC UA: ABNORMAL /HPF (ref 0–4)
SODIUM BLD-SCNC: 137 MMOL/L (ref 136–145)
SPECIFIC GRAVITY UA: >=1.03 (ref 1–1.03)
URINE TYPE: ABNORMAL
UROBILINOGEN, URINE: 0.2 E.U./DL
WBC UA: ABNORMAL /HPF (ref 0–5)

## 2021-04-14 PROCEDURE — 2580000003 HC RX 258: Performed by: HOSPITALIST

## 2021-04-14 PROCEDURE — 94669 MECHANICAL CHEST WALL OSCILL: CPT

## 2021-04-14 PROCEDURE — 83735 ASSAY OF MAGNESIUM: CPT

## 2021-04-14 PROCEDURE — 36415 COLL VENOUS BLD VENIPUNCTURE: CPT

## 2021-04-14 PROCEDURE — 80048 BASIC METABOLIC PNL TOTAL CA: CPT

## 2021-04-14 PROCEDURE — 99232 SBSQ HOSP IP/OBS MODERATE 35: CPT | Performed by: NURSE PRACTITIONER

## 2021-04-14 PROCEDURE — 6360000002 HC RX W HCPCS: Performed by: HOSPITALIST

## 2021-04-14 PROCEDURE — 97110 THERAPEUTIC EXERCISES: CPT

## 2021-04-14 PROCEDURE — 97116 GAIT TRAINING THERAPY: CPT

## 2021-04-14 PROCEDURE — 6360000002 HC RX W HCPCS: Performed by: INTERNAL MEDICINE

## 2021-04-14 PROCEDURE — 97535 SELF CARE MNGMENT TRAINING: CPT

## 2021-04-14 PROCEDURE — 6370000000 HC RX 637 (ALT 250 FOR IP): Performed by: INTERNAL MEDICINE

## 2021-04-14 PROCEDURE — 1200000000 HC SEMI PRIVATE

## 2021-04-14 PROCEDURE — 94761 N-INVAS EAR/PLS OXIMETRY MLT: CPT

## 2021-04-14 PROCEDURE — 93017 CV STRESS TEST TRACING ONLY: CPT

## 2021-04-14 PROCEDURE — 3430000000 HC RX DIAGNOSTIC RADIOPHARMACEUTICAL: Performed by: INTERNAL MEDICINE

## 2021-04-14 PROCEDURE — 6370000000 HC RX 637 (ALT 250 FOR IP): Performed by: NURSE PRACTITIONER

## 2021-04-14 PROCEDURE — A9502 TC99M TETROFOSMIN: HCPCS | Performed by: INTERNAL MEDICINE

## 2021-04-14 PROCEDURE — 2700000000 HC OXYGEN THERAPY PER DAY

## 2021-04-14 PROCEDURE — 6370000000 HC RX 637 (ALT 250 FOR IP): Performed by: HOSPITALIST

## 2021-04-14 PROCEDURE — 94640 AIRWAY INHALATION TREATMENT: CPT

## 2021-04-14 PROCEDURE — 81001 URINALYSIS AUTO W/SCOPE: CPT

## 2021-04-14 RX ADMIN — Medication 10 ML: at 21:44

## 2021-04-14 RX ADMIN — RIVAROXABAN 15 MG: 15 TABLET, FILM COATED ORAL at 17:27

## 2021-04-14 RX ADMIN — IPRATROPIUM BROMIDE AND ALBUTEROL SULFATE 1 AMPULE: .5; 3 SOLUTION RESPIRATORY (INHALATION) at 11:44

## 2021-04-14 RX ADMIN — IPRATROPIUM BROMIDE AND ALBUTEROL SULFATE 1 AMPULE: .5; 3 SOLUTION RESPIRATORY (INHALATION) at 20:06

## 2021-04-14 RX ADMIN — IPRATROPIUM BROMIDE AND ALBUTEROL SULFATE 1 AMPULE: .5; 3 SOLUTION RESPIRATORY (INHALATION) at 16:47

## 2021-04-14 RX ADMIN — PANTOPRAZOLE SODIUM 40 MG: 40 TABLET, DELAYED RELEASE ORAL at 09:44

## 2021-04-14 RX ADMIN — ASPIRIN 81 MG: 81 TABLET, COATED ORAL at 09:44

## 2021-04-14 RX ADMIN — Medication 10 ML: at 09:44

## 2021-04-14 RX ADMIN — TETROFOSMIN 33.4 MILLICURIE: 1.38 INJECTION, POWDER, LYOPHILIZED, FOR SOLUTION INTRAVENOUS at 08:21

## 2021-04-14 RX ADMIN — METHYLPREDNISOLONE SODIUM SUCCINATE 60 MG: 125 INJECTION, POWDER, FOR SOLUTION INTRAMUSCULAR; INTRAVENOUS at 21:43

## 2021-04-14 RX ADMIN — REGADENOSON 0.4 MG: 0.08 INJECTION, SOLUTION INTRAVENOUS at 08:21

## 2021-04-14 RX ADMIN — IPRATROPIUM BROMIDE AND ALBUTEROL SULFATE 1 AMPULE: .5; 3 SOLUTION RESPIRATORY (INHALATION) at 07:30

## 2021-04-14 RX ADMIN — METHYLPREDNISOLONE SODIUM SUCCINATE 60 MG: 125 INJECTION, POWDER, FOR SOLUTION INTRAMUSCULAR; INTRAVENOUS at 09:44

## 2021-04-14 RX ADMIN — ATORVASTATIN CALCIUM 80 MG: 80 TABLET, FILM COATED ORAL at 21:44

## 2021-04-14 ASSESSMENT — PAIN SCALES - GENERAL
PAINLEVEL_OUTOF10: 0
PAINLEVEL_OUTOF10: 0

## 2021-04-14 NOTE — PROGRESS NOTES
Hospitalist Progress Note      PCP: Lynsey Blackburn MD    Date of Admission: 4/10/2021    Chief Complaint: SOB     Hospital Course: H & P reviewed. Pt admitted with COPD exacerbation, elevated troponin     Subjective:     Minimal SOB. Improving hypoxia. No chest pain. Medications:  Reviewed    Infusion Medications    sodium chloride       Scheduled Medications    rivaroxaban  15 mg Oral Dinner    ipratropium-albuterol  1 ampule Inhalation Q4H WA    aspirin  81 mg Oral Daily    methylPREDNISolone  60 mg Intravenous Q12H    sodium chloride flush  10 mL Intravenous 2 times per day    pantoprazole  40 mg Oral QAM AC    atorvastatin  80 mg Oral Nightly     PRN Meds: perflutren lipid microspheres, sodium chloride flush, sodium chloride, senna, acetaminophen **OR** acetaminophen, potassium chloride **OR** potassium alternative oral replacement **OR** potassium chloride, magnesium sulfate, promethazine **OR** [DISCONTINUED] ondansetron, ipratropium-albuterol      Intake/Output Summary (Last 24 hours) at 4/14/2021 1700  Last data filed at 4/14/2021 0944  Gross per 24 hour   Intake 490 ml   Output 850 ml   Net -360 ml       Physical Exam Performed:    BP (!) 153/73   Pulse 75   Temp 98.2 °F (36.8 °C) (Oral)   Resp 18   Ht 6' 5\" (1.956 m)   Wt 200 lb 14.4 oz (91.1 kg)   SpO2 92%   BMI 23.82 kg/m²     General appearance: No apparent distress, appears stated age and cooperative. HEENT: Pupils equal, round,. Conjunctivae/corneas clear. Neck: Supple, with full range of motion. No jugular venous distention. Trachea midline. Respiratory:  Normal respiratory effort. Diminished, poor air movement, no wheezing. Cardiovascular: Regular rate and rhythm with normal S1/S2 without murmurs, rubs or gallops. Abdomen: Soft, non-tender, non-distended with normal bowel sounds. Musculoskeletal: No clubbing, cyanosis or edema bilaterally.     Skin: warm and dry   Neurologic:  Alert speech clear with no overt facial droop  Psychiatric: Alert and oriented, thought content appropriate, normal insight      Labs:   Recent Labs     04/12/21  0603   WBC 15.8*   HGB 13.3*   HCT 40.4*        Recent Labs     04/12/21  0603 04/13/21  0544 04/14/21  0533   * 136 137   K 4.5 4.9 5.2*   CL 96* 99 100   CO2 29 30 30   BUN 48* 57* 49*   CREATININE 2.6* 2.4* 1.9*   CALCIUM 9.2 9.4 9.1     No results for input(s): AST, ALT, BILIDIR, BILITOT, ALKPHOS in the last 72 hours. No results for input(s): INR in the last 72 hours. No results for input(s): Carolyne Ends in the last 72 hours. Urinalysis:      Lab Results   Component Value Date    NITRU Negative 04/14/2021    WBCUA 3-5 04/14/2021    BACTERIA Rare 04/14/2021    RBCUA 3-4 04/14/2021    BLOODU Negative 04/14/2021    SPECGRAV >=1.030 04/14/2021    GLUCOSEU Negative 04/14/2021    GLUCOSEU NEGATIVE 03/27/2012       Radiology:  NM Cardiac Stress Test Nuclear Imaging   Final Result      CT CARDIAC CALCIUM SCORING   Final Result   Total Agatston calcium score of 207 indicates moderate plaque burden as   described below. Patient in the 40th percentile for age. No incidental clinically important extracardiac CT findings. Calcium Score Interpretation      0  No identifiable atherosclerotic plaque. Very low cardiovascular disease   risk. Less than 5% chance of presence of coronary artery disease. A   negative examination. 1-10 Minimal plaque burden. Significant coronary artery disease very   unlikely.  Mild plaque burden. Likely mild or minimal coronary stenosis. 101-400 Moderate plaque burden. Moderate non-obstructive coronary artery   disease highly likely. Over 400 Extensive plaque burden. High likelihood of at least one   significant coronary artery stenosis (>50% diameter). CALCIUM SCORING OVERVIEW:      Coronary calcium is a marker for plaque in a blood vessel or atherosclerosis   (hardening of the arteries).   The presence and amount of calcium detected in   the coronary artery by the CT scan estimates the presence and amount of   atherosclerotic plaque. These calcium deposits can appear years before the   development of heart disease symptoms such as chest pain and shortness of   breath. A calcium score is computed for each of the coronary arteries based upon the   volume and density of the calcium deposits. This can be referred to as your   calcified plaque burden. It does not correspond directly to the percentage   of narrowing in the artery, but does correlate with the severity of the   overall coronary atherosclerotic burden. This score is then used to determine the calcium percentile which compares   your calcified plaque burden to that of other asymptomatic men and women of   the same age. The calcium score, in combination with the percentile, enables   your physician to determine your risk of developing symptomatic coronary   artery disease, and to measure the progression of disease as well as the   effectiveness of treatment. A score of zero indicates that there is no calcified plaque burden. This   implies that there is no significant coronary artery narrowing and very low   likelihood of a cardiac event over at least the next 3 years. It does not   absolutely rule out the presence of soft, noncalcified plaque or totally   eliminate the possibility of a cardiac event. A score greater than zero indicates at least some coronary artery disease. As the score increases, so does the likelihood of a significant coronary   narrowing and the likelihood of a coronary event over the next 3 years. Similarly, the likelihood of a coronary event increases with increasing   calcium percentiles. CT CHEST PULMONARY EMBOLISM W CONTRAST   Final Result   1. No evidence of pulmonary embolic disease. 2. No acute pulmonary findings. Pulmonary emphysema.    3. Stable mass in the thoracic inlet on the right

## 2021-04-14 NOTE — CARE COORDINATION
Pt still requiring 3.5L/NC that he does not require at baseline. RICH called [phone] line; should pt require O2 @ DC orders need faxed to below -    Attn: Dr. Matt Sarmiento  [phone]    RICH did place call to [employer] to inquire if they are able to provide tank for transport home as the [place] will not be able to do so. They will contract home O2 company but will have supplies sent to pt home after DC. Awaiting return call for [facility] who is covering for [employer] the rest of the week. Home O2 walk test documentation placed in sticky note to treatment team to be completed by primary RN within 24 hours of DC should pt still require.      Randi Drew, RN

## 2021-04-14 NOTE — PLAN OF CARE
Problem: OXYGENATION/RESPIRATORY FUNCTION  Goal: Patient will maintain patent airway  Outcome: Ongoing  Note:   Patient's EF (Ejection Fraction) is greater than 40%    Heart Failure Medications:  Diuretics[de-identified] None    (One of the following REQUIRED for EF <40%/SYSTOLIC FAILURE but MAY be used in EF% >40%/DIASTOLIC FAILURE)        ACE[de-identified] None        ARB[de-identified] None         ARNI[de-identified] None    (Beta Blockers)  NON- Evidenced Based Beta Blocker (for EF% >40%/DIASTOLIC FAILURE): None    Evidenced Based Beta Blocker::(REQUIRED for EF% <40%/SYSTOLIC FAILURE) None  . .................................................................................................................................................. Patient's weights and intake/output reviewed: Yes    Patient's Last Weight: 200 lbs obtained by standing scale. Difference of 1 lbs less than last documented weight. Intake/Output Summary (Last 24 hours) at 4/14/2021 0039  Last data filed at 4/13/2021 2225  Gross per 24 hour   Intake 720 ml   Output 875 ml   Net -155 ml       Comorbidities Reviewed Yes    Patient has a past medical history of Abdominal Pain, Anxiety State, BPH (benign prostatic hyperplasia), Cancer ([address] Chest pain, Colon polyp, DVT, lower extremity ([address] Hernia, HYPERTENSION, PE (pulmonary embolism), Prostatitis, Hx of, and SBO (small bowel obstruction) ([address] >>For CHF and Comorbidity documentation on Education Time and Topics, please see Education Tab    Progressive Mobility Assessment:  What is this patient's Current Level of Mobility?: Ambulatory-Up Ad Misty  How was this patient Mobilized today?: Edge of Bed and Up in Room                 With Whom? Self                 Level of Difficulty/Assistance: Independent     Pt resting in bed at this time on  3.5 L O2. Pt denies shortness of breath. Pt with nonpitting lower extremity edema.      Patient and/or Family's stated Goal of Care this Admission: reduce shortness of breath, increase activity tolerance, and be more comfortable prior to discharge        :      Problem:  Activity Intolerance:  Goal: Ability to tolerate increased activity will improve  Description: Ability to tolerate increased activity will improve  Outcome: Ongoing

## 2021-04-14 NOTE — PROGRESS NOTES
[address]   Daily Progress Note    Admit Date:  4/10/2021  HPI:    Chief Complaint   Patient presents with    Shortness of Breath     pt to ED with c/o SOB since monday. pt got second dose of mederna vaccine on sunday. hx of COPD. no oxygen use at home. given albuterol treatment by EMS. reports some relief. EMS found pt 89% on RA at home        Interval history: Raj Aase is being followed for shortness of breath. Hx of COPD, Afib on xarelto, prior tobacco abuse     Subjective:  Mr. Nadia Webb completed stress test this am. Denies much shortness of breath, wheezing is improved. Objective:   BP (!) 104/54   Pulse 66   Temp 97.7 °F (36.5 °C) (Oral)   Resp 18   Ht 6' 5\" (1.956 m)   Wt 200 lb 14.4 oz (91.1 kg)   SpO2 96%   BMI 23.82 kg/m²       Intake/Output Summary (Last 24 hours) at 4/14/2021 0906  Last data filed at 4/14/2021 0537  Gross per 24 hour   Intake 720 ml   Output 1050 ml   Net -330 ml       NYHA: IV    Physical Exam:  General:  Awake, alert, NAD  Skin:  Warm and dry  Neck:  JVD<8  Chest:  No wheezing, diminished throughout no rhonchi/rales  Telemetry: NSR Rate 80's  Cardiovascular:  RRR S1S2, no m/r/g   Abdomen:  Soft, nontender, +bowel sounds  Extremities:  Trace bilateral lower extremity edema    Medications:    ipratropium-albuterol  1 ampule Inhalation Q4H WA    aspirin  81 mg Oral Daily    methylPREDNISolone  60 mg Intravenous Q12H    sodium chloride flush  10 mL Intravenous 2 times per day    pantoprazole  40 mg Oral QAM AC    atorvastatin  80 mg Oral Nightly    rivaroxaban  20 mg Oral Dinner      sodium chloride       Lab Data:  CBC:   Recent Labs     04/12/21  0603   WBC 15.8*   HGB 13.3*        BMP:    Recent Labs     04/12/21  0603 04/13/21  0544 04/14/21  0533   * 136 137   K 4.5 4.9 5.2*   CO2 29 30 30   BUN 48* 57* 49*   CREATININE 2.6* 2.4* 1.9*     INR:    No results for input(s): INR in the last 72 hours.   BNP:    Recent Labs 21  0544   PROBNP 255*     Lab Results   Component Value Date    LVEF 58 2017     Testing:  Echo:  2017   Left ventricular systolic function is normal with an ejection fraction of   55-60%. Septal bounce noted. Grade I diastolic dysfunction with normal filling pressure. Systolic pulmonary artery pressure (SPAP) is normal and estimated at 36 mmHg   (RA pressure 3 mmHg). Compared to previous study from 2014 ejection fraction remains   unchanged. Echo 21  Summary   Technically difficult examination. Normal left ventricular systolic function with ejection fraction of 55-60%. No regional wall motion abnormalites are seen. Moderate RV enlargement. Normal left ventricular size with mild concentric left ventricular   hypertrophy. Grade I diastolic dysfunction with normal filling pressure. Compared to previous study from 3- no changes noted in left   ventricular function. CT calcium score 21  FINDINGS:   There is some limitation due to respiratory motion.       LEFT MAIN: Zero (0).     RIGHT CORONARY ARTERY: 70       LEFT ANTERIOR DESCENDIN       CIRCUMFLEX: 8       TOTAL AGATSTON CALCIUM SCORE: 207       EXTRACARDIAC STRUCTURES: No evidence of mediastinal or hilar lymphadenopathy. No pericardial effusion.  No cardiomegaly.  No evidence of acute process in   the lungs.  There is diffuse centrilobular emphysema, upper lungs not   entirely included.  No noncalcified nodules are noted in the lungs.  Limited   images of the upper abdomen are grossly unremarkable.  Visualized osseous   structures demonstrate age related degenerative changes without acute   abnormality.  Each of these findings are better seen on recent complete CT   chest, 04/10/2021.      Stress 21   Summary  Normal LVEF >60% Diaphragmatic attenuation artifact  Septal hypokinesis  Fixed septal/apical defect suggestive of scar  No ischemia     21 04:31:01  200 lb 14.4 oz (91.1 kg) Standing scale      04/13/21 04:16:51  200 lb 3.2 oz (90.8 kg)  Standing scale     04/12/21 04:17:02  199 lb 8 oz (90.5 kg)  Standing scale     04/11/21 04:03:35  198 lb 4.8 oz (89.9 kg)  Standing scale     04/10/21 1845  201 lb 1 oz (91.2 kg)  Standing scale     04/10/21 1358  198 lb (89.8 kg)  Stated       Active Problems:    Essential hypertension    Prostate cancer (HCC)    Hx of deep venous thrombosis    COPD exacerbation (HCC)    History of pulmonary embolus (PE)    PAF (paroxysmal atrial fibrillation) ([facility])  Resolved Problems:    * No resolved hospital problems. *    Assessment:  Shortness of breath- likely multifactorial, suspect COPD exacerbation   Elevated troponin- mild  Afib- chronic   HTN  HLD  Hx of DVT  COPD  QUITA  Hx of prostate cancer/ urinary incontinence     Plan:  Off of losartan due to QUITA- appreciate nephrology assistance   D/C nitro paste due to hypotension  D/c metoprolol for now due to hypotension  Continue aspirin and statin   Reduce xarelto to 15mg daily for creatinine clearance  Stress test discussed with patient and shows fixed defect, no ischemia.      Cardiology will sign off, please call with questions    Cricket , EL, 4/14/2021, 1:56 PM

## 2021-04-14 NOTE — PROGRESS NOTES
[facility]    [facility]              [phone]                      He is admitted with shortness of breath. He is found to have COPD exacerbation and also possible non-STEMI which he is being treated. His course is also complicated by hypotensive episodes. we are following for QUITA and related issues. Interval History and plan:      Creatinine down to 1.9 from peak of 2.6  Potassium 5.2  Blood pressure soft at 810 systolic  Oxygen requirement slightly better at 3.5 from 4 L yesterday  On IV steroids    Plan:  Continue to hold blood pressure medications  UA ordered     Assessment :     Acute Kidney Injury  QUITA likely due to -dynamic changes-hypotension, but he also got contrast on the day of admission, also has been on diuretic which has been  held now  Cr on consultation 2.6  Baseline Cr-1.1 on 3/17  UA- pending  Renal Imaging:-CT 2014-no mention of renal abnormality,  No indication to do at this time due to QUITA admitting due to shift and blood pressure and contrast   -History of prostate cancer status post prostatectomy, also has urinary incontinence  Echo: pending      Hypertension   BP: (104-110)/(54-63)  Pulse:  [66-73]   BP goal inpatient [id number] systolic inpatient  Blood pressure low on consult  Atrial fibrillation    COPD exacerbation  Elevated troponin  [facility] would like to thank Sim Fried MD   for opportunity to serve this patient      Please call with questions at-   24 Hrs Answering service [phone] or  7 am- 5 pm via Perfect serve or cell phone  Dr.Sudhir Keshav Vazquez          CC/reason for consult :     QUITA     HPI :     Ernestina Norris is a 72 y.o. male presented to   the hospital on 4/10/2021 with shortness of breath for several weeks. If any has shortness of breath for some time which got worse after COVID-19 vaccine. He is known to have COPD but not on oxygen dependence.   He also has a history of smoking up until several weeks before this hospitalization. He came to the emergency room where an EKG was done which showed possible ST segment depression. His troponin was slightly up BNP was up at 1000. He has had chest x-ray done which showed no acute disease. He had a CT PE done which was negative for thromboembolism. Seen by cardiology and plan for possible angiogram this hospitalization  In the meantime we are consulted for QUITA and related issues    ROS:     Seen with- RN     positives in bold   Constitutional:  fever, chills, weakness, weight change, fatigue  Skin:  rash, pruritus, hair loss, bruising, dry skin, petechiae  Head, Face, Neck   headaches, swelling,  cervical adenopathy  Respiratory: shortness of breath, cough, or wheezing  Cardiovascular: chest pain, palpitations, dizzy, edema  Gastrointestinal: nausea, vomiting, diarrhea, constipation,belly pain    Yellow skin, blood in stool  Musculoskeletal:  back pain, muscle weakness, gait problems,       joint pain or swelling. Genitourinary:  dysuria, poor urine flow, flank pain, blood in urine  Neurologic:  vertigo, TIA'S, syncope, seizures, focal weakness  Psychosocial:  insomnia, anxiety, or depression.   Additional positive findings: SOB and wheeze much better than on admission                   All other remaining systems are negative or unable to obtain        PMH/PSH/SH/Family History:     Past Medical History:   Diagnosis Date    Abdominal Pain     Anxiety State     BPH (benign prostatic hyperplasia) 10/3/2013    Cancer ([facility] 2014    prostate    Chest pain     Colon polyp 3/27/2012    DVT, lower extremity ([address]     Left Leg    Hernia 1/24/2014    HYPERTENSION     PE (pulmonary embolism) 2/7/2014    Pt denies    Prostatitis, Hx of     SBO (small bowel obstruction) ([facility] [address] 1/24/2014       Past Surgical History:   Procedure Laterality Date    HERNIA REPAIR      INGUINAL HERNIA REPAIR Right 1/23/14    incarcerated right inguinal hernia repair    OTHER SURGICAL HISTORY 1/16/2014    ROBOTIC ASSISTED LAPAROSCOPIC PROSTATECTOMY    PROSTATE BIOPSY      TONSILLECTOMY          reports that he quit smoking about 4 years ago. He smoked 0.00 packs per day for 44.00 years. He has never used smokeless tobacco. He reports current alcohol use. He reports that he does not use drugs. family history includes Cancer in his brother, brother, father, mother, and sister.          Medication:     Current Facility-Administered Medications: ipratropium-albuterol (DUONEB) nebulizer solution 1 ampule, 1 ampule, Inhalation, Q4H WA  aspirin EC tablet 81 mg, 81 mg, Oral, Daily  regadenoson (LEXISCAN) injection 0.4 mg, 0.4 mg, Intravenous, ONCE PRN  methylPREDNISolone sodium (SOLU-MEDROL) injection 60 mg, 60 mg, Intravenous, Q12H  perflutren lipid microspheres (DEFINITY) injection 1.65 mg, 1.5 mL, Intravenous, ONCE PRN  sodium chloride flush 0.9 % injection 10 mL, 10 mL, Intravenous, 2 times per day  sodium chloride flush 0.9 % injection 10 mL, 10 mL, Intravenous, PRN  0.9 % sodium chloride infusion, 25 mL, Intravenous, PRN  senna (SENOKOT) tablet 8.6 mg, 1 tablet, Oral, Daily PRN  acetaminophen (TYLENOL) tablet 650 mg, 650 mg, Oral, Q6H PRN **OR** acetaminophen (TYLENOL) suppository 650 mg, 650 mg, Rectal, Q6H PRN  potassium chloride (KLOR-CON M) extended release tablet 40 mEq, 40 mEq, Oral, PRN **OR** potassium bicarb-citric acid (EFFER-K) effervescent tablet 40 mEq, 40 mEq, Oral, PRN **OR** potassium chloride 10 mEq/100 mL IVPB (Peripheral Line), 10 mEq, Intravenous, PRN  magnesium sulfate 2000 mg in 50 mL IVPB premix, 2,000 mg, Intravenous, PRN  promethazine (PHENERGAN) tablet 12.5 mg, 12.5 mg, Oral, Q6H PRN **OR** [DISCONTINUED] ondansetron (ZOFRAN) injection 4 mg, 4 mg, Intravenous, Q6H PRN  pantoprazole (PROTONIX) tablet 40 mg, 40 mg, Oral, QAM AC  atorvastatin (LIPITOR) tablet 80 mg, 80 mg, Oral, Nightly  rivaroxaban (XARELTO) tablet 20 mg, 20 mg, Oral, Dinner  ipratropium-albuterol (DUONEB) nebulizer solution 1 ampule, 1 ampule, Inhalation, Q4H PRN       Vitals :     Vitals:    04/14/21 0730   BP:    Pulse:    Resp: 18   Temp:    SpO2: 96%       I & O :       Intake/Output Summary (Last 24 hours) at 4/14/2021 0817  Last data filed at 4/14/2021 0537  Gross per 24 hour   Intake 720 ml   Output 1050 ml   Net -330 ml        Physical Examination :     General appearance: Anxious- no, distressed- no, in good spirits- yes  HEENT: Lips- normal, teeth- ok , oral mucosa- moist  Neck : Mass- no, appears symmetrical, JVD- not visible  Respiratory: Respiratory effort-comfortable on oxygen, wheeze-yes, crackles -no  Cardiovascular:  Ausculation- No M/R/G, Edema no  Abdomen: visible mass- no, distention- no, scar- no, tenderness- no                            hepatosplenomegaly-  no  Musculoskeletal:  clubbing no,cyanosis- no , digital ischemia- no                           muscle strength- grossly normal , tone - grossly normal  Skin: rashes- no , ulcers- no, induration- no, tightening - no  Psychiatric:  Judgement and insight- normal           AAO X 3  Additional finding: -      LABS:     Recent Labs     04/11/21  0900 04/12/21  0603   WBC 13.1* 15.8*   HGB 14.3 13.3*   HCT 42.9 40.4*    230     Recent Labs     04/12/21  0603 04/13/21  0544 04/14/21  0533   * 136 137   K 4.5 4.9 5.2*   CL 96* 99 100   CO2 29 30 30   BUN 48* 57* 49*   CREATININE 2.6* 2.4* 1.9*   GLUCOSE 149* 142* 151*   MG 2.10 2.20 2.20

## 2021-04-14 NOTE — PROGRESS NOTES
1/23/14). Restrictions  Restrictions/Precautions  Restrictions/Precautions: General Precautions, Fall Risk  Position Activity Restriction  Other position/activity restrictions: up with assist, tele, 3LO2     Subjective   General  Chart Reviewed: Yes  Patient assessed for rehabilitation services?: Yes  Response to previous treatment: Patient with no complaints from previous session  Family / Caregiver Present: No  Referring Practitioner: James Barkley  Diagnosis: decompensated heart failure  Subjective  Subjective: Pt agreeable to OT  General Comment  Comments: RN approved therapy  Vital Signs  Pulse: 83  Heart Rate Source: Monitor  BP: 127/76  BP Location: Left Arm  Patient Currently in Pain: Denies  Oxygen Therapy  O2 Device: Nasal cannula(1.5L O2)     Orientation  Orientation  Overall Orientation Status: Within Functional Limits     Objective    ADL  Grooming: Supervision(Pt combed hair at sink in stance with supervision.)  LE Dressing: Setup(Pt donned shoes sitting EOB with supervision.)        Balance  Sitting Balance: Independent  Standing Balance: Contact guard assistance  Standing Balance  Time: ~3min for grooming and function mobility in room  Activity: t/f, mobility, static standing  Comment: CGA, no AD  Functional Mobility  Functional - Mobility Device: No device  Activity: To/from bathroom  Assist Level: Contact guard assistance  Toilet Transfers  Toilet - Technique: Ambulating  Equipment Used: Grab bars  Toilet Transfer: Contact guard assistance  Toilet Transfers Comments: Pt t/f on/off toilet with CGA and no AD. Bed mobility  Bridging: Unable to assess  Rolling to Left: Unable to assess  Rolling to Right: Unable to assess  Supine to Sit: Unable to assess  Sit to Supine: Unable to assess  Scooting: Unable to assess  Comment: Pt sitting EOB at beginning and end of session. RN notified and approved.   Transfers  Sit to stand: Supervision  Stand to sit: Contact guard assistance Type of ROM/Therapeutic Exercise  Type of ROM/Therapeutic Exercise: AROM  Comment: Seated EOB. Exercises  Scapular Protraction: x15  Scapular Retraction: x15  Shoulder Depression: x15  Shoulder Elevation: x15  Shoulder Flexion: x15  Shoulder Extension: x15  Elbow Flexion: x15  Elbow Extension: x15  Supination: x15  Pronation: x15  Wrist Flexion: x15  Wrist Extension: x15  Finger Flexion: x15  Finger Extension: x15  LUE AROM (degrees)  LUE AROM : WFL  Left Hand AROM (degrees)  Left Hand AROM: WFL  RUE AROM (degrees)  RUE AROM : WFL  Right Hand AROM (degrees)  Right Hand AROM: WFL                 Plan   Plan  Times per week: 3-5x/wk  Current Treatment Recommendations: Balance Training, Functional Mobility Training, Endurance Training, Self-Care / ADL, Safety Education & Training, Strengthening, Patient/Caregiver Education & Training    AM-PAC Score        AM-PAC Inpatient Daily Activity Raw Score: 22 (04/14/21 1524)  AM-PAC Inpatient ADL T-Scale Score : 47.1 (04/14/21 1524)  ADL Inpatient CMS 0-100% Score: 25.8 (04/14/21 1524)  ADL Inpatient CMS G-Code Modifier : Jon Gross (04/14/21 1524)    Goals  Short term goals  Time Frame for Short term goals: 1-2 weeks (4/18)  Short term goal 1: Pt will complete ADL t/fs with distant SPV by 4/17.-ongoing, pt t/f sit to stand with supervision and no AD. Pt t/f stand to sit with CGA and no AD (4/14)  Short term goal 2: Pt will complete toileting with SPV. -ongoing, SBA LE ADL tasks 4/12  Short term goal 3: Pt will complete UB/LB dressing with SPV. -ongoing, pt completed LB dressing of donning shoes with set up.   Short term goal 4: Pt will complete x3 sets of x15 reps BUE therex w/o SOB to increase endurance.-ongoing, 15 x each 4/14  Patient Goals   Patient goals : Return home       Therapy Time   Individual Concurrent Group Co-treatment   Time In 1319         Time Out 1344         Minutes 25         Timed Code Treatment Minutes: Karis Micah [address] 666, OTR/L  If pt discharges prior to next session, this note will serve as discharge summary. See case management note for discharge disposition.

## 2021-04-14 NOTE — PROGRESS NOTES
Physical Therapy  Facility/Department: [facility] A2 CARD TELEMETRY  Daily Treatment Note  NAME: Rhea Torres  : 1950  MRN: [id number]    Date of Service: 2021    Discharge Recommendations:  24 hour supervision or assist, Home with Home health PT   PT Equipment Recommendations  Equipment Needed: Yes  Other: evaluate need for a rw for ambulation,    Assessment   Body structures, Functions, Activity limitations: Decreased functional mobility ; Decreased endurance;Decreased strength;Decreased balance  Assessment: Pt is requiring supervision for transfers and SBA to CGA for gait without AD with one mild LOB and very narrow HERBERT with gait. Cont per POC to address deficits and to address stair negotitaion. Treatment Diagnosis: Decreased activity tolerance associated with SOB  Prognosis: Good  Decision Making: Medium Complexity  PT Education: Goals;PT Role;Energy Conservation;Home Exercise Program;General Safety  Patient Education: Pt was educated regarding PT role in tx, as well as monitoring for dizziness/SOB when changing positions. Pt voiced understanding and will continue to benefit from skilled education. Barriers to Learning: none  REQUIRES PT FOLLOW UP: Yes  Activity Tolerance  Activity Tolerance: Patient Tolerated treatment well;Patient limited by endurance  Activity Tolerance: /70;   HR 85 prior to ambulation with SpO2  94% on 1.5L; after ambulation on 1.5L , Spo2 90% with HR at 90, improved to 94% with 1 min seated rest at EOB     Patient Diagnosis(es): The encounter diagnosis was Shortness of breath. has a past medical history of Abdominal Pain, Anxiety State, BPH (benign prostatic hyperplasia), Cancer ([address] Chest pain, Colon polyp, DVT, lower extremity ([address] Hernia, HYPERTENSION, PE (pulmonary embolism), Prostatitis, Hx of, and SBO (small bowel obstruction) ([address] has a past surgical history that includes hernia repair; Prostate biopsy;  Tonsillectomy; other surgical history (1/16/2014); and Inguinal hernia repair (Right, 1/23/14). Restrictions  Restrictions/Precautions  Restrictions/Precautions: General Precautions, Fall Risk  Position Activity Restriction  Other position/activity restrictions: up with assist, tele, 1.5 LO2  Subjective   General  Chart Reviewed: Yes  Additional Pertinent Hx: H/o COPD and CHF  Response To Previous Treatment: Patient reporting fatigue but able to participate.   Family / Caregiver Present: No  Referring Practitioner: Dr. Raciel Kelly  Subjective  Subjective: Pt sitting EOB upon entry, RN cleared pt for therapy, pt agreeable to PT  Pain Screening  Patient Currently in Pain: Denies  Vital Signs  Patient Currently in Pain: Denies       Orientation  Orientation  Overall Orientation Status: Within Normal Limits       Objective   Bed mobility  Supine to Sit: Unable to assess(Pt sitting EOB upon entry and at EOS, RN aware)  Sit to Supine: Unable to assess  Scooting: Independent(to scoot forward to EOB)  Transfers  Sit to Stand: Supervision  Stand to sit: Supervision  Ambulation  Ambulation?: Yes  Ambulation 1  Surface: level tile  Device: No Device  Assistance: Stand by assistance;Contact guard assistance  Quality of Gait: mildly unsteady, one episode of slight LOB, self corrected, grossly SBA to CGA, very narrow HERBERT, almost as if pt is walking a line, pt reporting this is baseline  Gait Deviations: Slow Yadi  Distance: 150'  Comments: mild SOB upon return from ambulation, see activity tolerance for vitals     Balance  Posture: Fair  Sitting - Static: Good  Sitting - Dynamic: Good  Standing - Static: Good;-  Standing - Dynamic: Fair;+(without AD)  Exercises  Gluteal Sets: x 15 B  Hip Flexion: BLE x15  Hip Abduction: BLE x15 clamshells with pillow squeeze  Knee Long Arc Quad: BLE x15  Ankle Pumps: BLE x15                          AM-PAC Score  AM-PAC Inpatient Mobility Raw Score : 22 (04/14/21 1611)  AM-PAC Inpatient T-Scale Score : 53.28 (04/14/21 1611)  Mobility Inpatient CMS 0-100% Score: 20.91 (04/14/21 1611)  Mobility Inpatient CMS G-Code Modifier : CJ (04/14/21 1611)          Goals  Short term goals  Time Frame for Short term goals: 1 week (4/18) (unless otherwise specified)  Short term goal 1: Pt will perform bed mobility independently without SOB -4/13 mod indep; 4/14 NT  Short term goal 2: Pt will perform t/f independently -4/14 superv  Short term goal 3: Pt will ambulate 150' independently without SOB -4/12 80' NAD cga/min  -4/14  150' SBA to [address] term goal 4: Pt will climb 24 steps with SBA-supervision without exacerbation of SOB  -4/14 NT  Short term goal 5: 4/15 Pt will be independent in home exercise program  -4/14  progressing  Patient Goals   Patient goals : \"To get better at breathing\"    Plan    Plan  Times per week: 3-5x/week  Times per day: Daily  Current Treatment Recommendations: Balance Training, Stair training, Functional Mobility Training, Home Exercise Program, Patient/Caregiver Education & Training, Strengthening, Endurance Training, Safety Education & Training, Transfer Training, Gait Training  Plan Comment: Progress functional mobility as tolerated  Safety Devices  Type of devices: All fall risk precautions in place, Call light within reach, Nurse notified, Gait belt, Patient at risk for falls, Left in bed(left EOB, no alarm, ok per RN)     Therapy Time   Individual Concurrent Group Co-treatment   Time In 1446         Time Out 1512         Minutes 26         Timed Code Treatment Minutes: 26 Minutes    If pt is discharged prior to next therapy session, this note will serve as discharge summary.     Denise Velasco, PT

## 2021-04-15 LAB
ANION GAP SERPL CALCULATED.3IONS-SCNC: 6 MMOL/L (ref 3–16)
BUN BLDV-MCNC: 45 MG/DL (ref 7–20)
CALCIUM SERPL-MCNC: 9.2 MG/DL (ref 8.3–10.6)
CHLORIDE BLD-SCNC: 100 MMOL/L (ref 99–110)
CO2: 34 MMOL/L (ref 21–32)
CREAT SERPL-MCNC: 1.6 MG/DL (ref 0.8–1.3)
GFR AFRICAN AMERICAN: 52
GFR NON-AFRICAN AMERICAN: 43
GLUCOSE BLD-MCNC: 151 MG/DL (ref 70–99)
MAGNESIUM: 2.1 MG/DL (ref 1.8–2.4)
POTASSIUM SERPL-SCNC: 5.2 MMOL/L (ref 3.5–5.1)
SODIUM BLD-SCNC: 140 MMOL/L (ref 136–145)

## 2021-04-15 PROCEDURE — 6370000000 HC RX 637 (ALT 250 FOR IP): Performed by: NURSE PRACTITIONER

## 2021-04-15 PROCEDURE — 36415 COLL VENOUS BLD VENIPUNCTURE: CPT

## 2021-04-15 PROCEDURE — 94761 N-INVAS EAR/PLS OXIMETRY MLT: CPT

## 2021-04-15 PROCEDURE — 80048 BASIC METABOLIC PNL TOTAL CA: CPT

## 2021-04-15 PROCEDURE — 1200000000 HC SEMI PRIVATE

## 2021-04-15 PROCEDURE — 94669 MECHANICAL CHEST WALL OSCILL: CPT

## 2021-04-15 PROCEDURE — 6370000000 HC RX 637 (ALT 250 FOR IP): Performed by: INTERNAL MEDICINE

## 2021-04-15 PROCEDURE — 83735 ASSAY OF MAGNESIUM: CPT

## 2021-04-15 PROCEDURE — 2700000000 HC OXYGEN THERAPY PER DAY

## 2021-04-15 PROCEDURE — 94640 AIRWAY INHALATION TREATMENT: CPT

## 2021-04-15 PROCEDURE — 6370000000 HC RX 637 (ALT 250 FOR IP): Performed by: HOSPITALIST

## 2021-04-15 PROCEDURE — 2580000003 HC RX 258: Performed by: HOSPITALIST

## 2021-04-15 PROCEDURE — 6360000002 HC RX W HCPCS: Performed by: HOSPITALIST

## 2021-04-15 RX ADMIN — IPRATROPIUM BROMIDE AND ALBUTEROL SULFATE 1 AMPULE: .5; 3 SOLUTION RESPIRATORY (INHALATION) at 16:34

## 2021-04-15 RX ADMIN — IPRATROPIUM BROMIDE AND ALBUTEROL SULFATE 1 AMPULE: .5; 3 SOLUTION RESPIRATORY (INHALATION) at 19:38

## 2021-04-15 RX ADMIN — METHYLPREDNISOLONE SODIUM SUCCINATE 60 MG: 125 INJECTION, POWDER, FOR SOLUTION INTRAMUSCULAR; INTRAVENOUS at 08:32

## 2021-04-15 RX ADMIN — ASPIRIN 81 MG: 81 TABLET, COATED ORAL at 08:33

## 2021-04-15 RX ADMIN — Medication 10 ML: at 08:33

## 2021-04-15 RX ADMIN — PANTOPRAZOLE SODIUM 40 MG: 40 TABLET, DELAYED RELEASE ORAL at 08:32

## 2021-04-15 RX ADMIN — ATORVASTATIN CALCIUM 80 MG: 80 TABLET, FILM COATED ORAL at 21:23

## 2021-04-15 RX ADMIN — IPRATROPIUM BROMIDE AND ALBUTEROL SULFATE 1 AMPULE: .5; 3 SOLUTION RESPIRATORY (INHALATION) at 11:39

## 2021-04-15 RX ADMIN — Medication 10 ML: at 21:35

## 2021-04-15 RX ADMIN — RIVAROXABAN 15 MG: 15 TABLET, FILM COATED ORAL at 17:22

## 2021-04-15 RX ADMIN — METHYLPREDNISOLONE SODIUM SUCCINATE 60 MG: 125 INJECTION, POWDER, FOR SOLUTION INTRAMUSCULAR; INTRAVENOUS at 21:23

## 2021-04-15 RX ADMIN — IPRATROPIUM BROMIDE AND ALBUTEROL SULFATE 1 AMPULE: .5; 3 SOLUTION RESPIRATORY (INHALATION) at 08:04

## 2021-04-15 ASSESSMENT — PAIN SCALES - GENERAL: PAINLEVEL_OUTOF10: 0

## 2021-04-15 NOTE — PLAN OF CARE
reduce shortness of breath, increase activity tolerance, better understand heart failure and disease management, be more comfortable, and reduce lower extremity edema prior to discharge        :       Problem: Breathing Pattern - Ineffective:  Goal: Ability to achieve and maintain a regular respiratory rate will improve  Description: Ability to achieve and maintain a regular respiratory rate will improve  Outcome: Ongoing  Note: Pt will maintain a respiratory rate of <20 respirations a minute.

## 2021-04-15 NOTE — PROGRESS NOTES
04/15/21 1140 04/15/21 1142   Oxygen Therapy/Pulse Ox   O2 Therapy Oxygen humidified Room air   O2 Device Nasal cannula None (Room air)   O2 Flow Rate (L/min) 0.5 L/min  (weaned to Room air)  --    Resp 18 18   SpO2 95 % 93 %

## 2021-04-15 NOTE — PLAN OF CARE
Problem: Nutrition  Goal: Optimal nutrition therapy  Outcome: Ongoing  Note: Nutrition Problem #1: Inadequate oral intake  Intervention: Food and/or Nutrient Delivery: Continue Current Diet, Continue Oral Nutrition Supplement  Nutritional Goals: Pt will consume greater than 50% of meals and ONS this admission w/o further weight loss

## 2021-04-15 NOTE — PROGRESS NOTES
[facility]    [facility]. [facility]              [phone]                      He is admitted with shortness of breath. He is found to have COPD exacerbation and also possible non-STEMI which he is being treated. His course is also complicated by hypotensive episodes. we are following for QUITA and related issues. Interval History and plan:      Fattening is down to 1.6  Potassium is 5.2  CO2 is 34-likely due to compensation to respiratory acidosis    Plan:  Continue to hold blood pressure medications  Monitor potassium       Assessment :     Acute Kidney Injury  QUITA likely due to -dynamic changes-hypotension, but he also got contrast on the day of admission, also has been on diuretic which has been  held now  Cr on consultation 2.6  Baseline Cr-1.1 on 3/17  UA-bland from 4/21  Renal Imaging:-CT 2014-no mention of renal abnormality,  No indication to do at this time due to QUITA admitting due to shift and blood pressure and contrast   -History of prostate cancer status post prostatectomy, also has urinary incontinence  Echo: 4/21-normal ejection fraction, mild concentric LVH, grade 1 diastolic dysfunction      Hypertension   BP: (126-134)/(64-73)  Pulse:  [69-78]   BP goal inpatient [id number] systolic inpatient  Blood pressure low on consult  Atrial fibrillation    COPD exacerbation  Elevated troponin  [facility] would like to thank Christal Adams MD   for opportunity to serve this patient      Please call with questions at-   24 Hrs Answering service [phone] or  7 am- 5 pm via Perfect serve or cell phone  Dr.Sudhir Rossy Mendoza          CC/reason for consult :     QUITA     HPI :     Phi Ramírez is a 72 y.o. male presented to   the hospital on 4/10/2021 with shortness of breath for several weeks. If any has shortness of breath for some time which got worse after COVID-19 vaccine. He is known to have COPD but not on oxygen dependence.   He also has a history of smoking up until several weeks before this hospitalization. He came to the emergency room where an EKG was done which showed possible ST segment depression. His troponin was slightly up BNP was up at 1000. He has had chest x-ray done which showed no acute disease. He had a CT PE done which was negative for thromboembolism. Seen by cardiology and plan for possible angiogram this hospitalization  In the meantime we are consulted for QUITA and related issues    ROS:     Seen with- RN     positives in bold   Constitutional:  fever, chills, weakness, weight change, fatigue  Skin:  rash, pruritus, hair loss, bruising, dry skin, petechiae  Head, Face, Neck   headaches, swelling,  cervical adenopathy  Respiratory: shortness of breath, cough, or wheezing  Cardiovascular: chest pain, palpitations, dizzy, edema  Gastrointestinal: nausea, vomiting, diarrhea, constipation,belly pain    Yellow skin, blood in stool  Musculoskeletal:  back pain, muscle weakness, gait problems,       joint pain or swelling. Genitourinary:  dysuria, poor urine flow, flank pain, blood in urine  Neurologic:  vertigo, TIA'S, syncope, seizures, focal weakness  Psychosocial:  insomnia, anxiety, or depression.   Additional positive findings: SOB and wheeze much better than on admission                   All other remaining systems are negative or unable to obtain        PMH/PSH/SH/Family History:     Past Medical History:   Diagnosis Date    Abdominal Pain     Anxiety State     BPH (benign prostatic hyperplasia) 10/3/2013    Cancer ([facility] [address] 2014    prostate    Chest pain     Colon polyp 3/27/2012    DVT, lower extremity ([facility] [address]     Left Leg    Hernia 1/24/2014    HYPERTENSION     PE (pulmonary embolism) 2/7/2014    Pt denies    Prostatitis, Hx of     SBO (small bowel obstruction) ([facility] [address] 1/24/2014       Past Surgical History:   Procedure Laterality Date    HERNIA REPAIR      INGUINAL HERNIA REPAIR Right 1/23/14    incarcerated right inguinal hernia repair    OTHER SURGICAL HISTORY  1/16/2014    ROBOTIC ASSISTED LAPAROSCOPIC PROSTATECTOMY    PROSTATE BIOPSY      TONSILLECTOMY          reports that he quit smoking about 4 years ago. He smoked 0.00 packs per day for 44.00 years. He has never used smokeless tobacco. He reports current alcohol use. He reports that he does not use drugs. family history includes Cancer in his brother, brother, father, mother, and sister.          Medication:     Current Facility-Administered Medications: rivaroxaban (XARELTO) tablet 15 mg, 15 mg, Oral, Dinner  ipratropium-albuterol (DUONEB) nebulizer solution 1 ampule, 1 ampule, Inhalation, Q4H WA  aspirin EC tablet 81 mg, 81 mg, Oral, Daily  methylPREDNISolone sodium (SOLU-MEDROL) injection 60 mg, 60 mg, Intravenous, Q12H  perflutren lipid microspheres (DEFINITY) injection 1.65 mg, 1.5 mL, Intravenous, ONCE PRN  sodium chloride flush 0.9 % injection 10 mL, 10 mL, Intravenous, 2 times per day  sodium chloride flush 0.9 % injection 10 mL, 10 mL, Intravenous, PRN  0.9 % sodium chloride infusion, 25 mL, Intravenous, PRN  senna (SENOKOT) tablet 8.6 mg, 1 tablet, Oral, Daily PRN  acetaminophen (TYLENOL) tablet 650 mg, 650 mg, Oral, Q6H PRN **OR** acetaminophen (TYLENOL) suppository 650 mg, 650 mg, Rectal, Q6H PRN  potassium chloride (KLOR-CON M) extended release tablet 40 mEq, 40 mEq, Oral, PRN **OR** potassium bicarb-citric acid (EFFER-K) effervescent tablet 40 mEq, 40 mEq, Oral, PRN **OR** potassium chloride 10 mEq/100 mL IVPB (Peripheral Line), 10 mEq, Intravenous, PRN  magnesium sulfate 2000 mg in 50 mL IVPB premix, 2,000 mg, Intravenous, PRN  promethazine (PHENERGAN) tablet 12.5 mg, 12.5 mg, Oral, Q6H PRN **OR** [DISCONTINUED] ondansetron (ZOFRAN) injection 4 mg, 4 mg, Intravenous, Q6H PRN  pantoprazole (PROTONIX) tablet 40 mg, 40 mg, Oral, QAM AC  atorvastatin (LIPITOR) tablet 80 mg, 80 mg, Oral, Nightly  ipratropium-albuterol (DUONEB) nebulizer solution 1 ampule, 1 ampule, Inhalation, Q4H PRN       Vitals :     Vitals:    04/15/21 1142   BP:    Pulse:    Resp: 18   Temp:    SpO2: 93%       I & O :       Intake/Output Summary (Last 24 hours) at 4/15/2021 1219  Last data filed at 4/15/2021 8398  Gross per 24 hour   Intake 1200 ml   Output 800 ml   Net 400 ml        Physical Examination :     General appearance: Anxious- no, distressed- no, in good spirits- yes  HEENT: Lips- normal, teeth- ok , oral mucosa- moist  Neck : Mass- no, appears symmetrical, JVD- not visible  Respiratory: Respiratory effort-comfortable on oxygen, wheeze-yes, crackles -no  Cardiovascular: Ausculation- No M/R/G, Edema no  Abdomen: visible mass- no, distention- no, scar- no, tenderness- no                            hepatosplenomegaly-  no  Musculoskeletal:  clubbing no,cyanosis- no , digital ischemia- no                           muscle strength- grossly normal , tone - grossly normal  Skin: rashes- no , ulcers- no, induration- no, tightening - no  Psychiatric:  Judgement and insight- normal           AAO X 3  Additional finding: -      LABS:     No results for input(s): WBC, HGB, HCT, PLT in the last 72 hours.   Recent Labs     04/13/21  0544 04/14/21  0533 04/15/21  0703    137 140   K 4.9 5.2* 5.2*   CL 99 100 100   CO2 30 30 34*   BUN 57* 49* 45*   CREATININE 2.4* 1.9* 1.6*   GLUCOSE 142* 151* 151*   MG 2.20 2.20 2.10

## 2021-04-15 NOTE — PROGRESS NOTES
Hospitalist Progress Note      PCP: Ulises Patiño MD    Date of Admission: 4/10/2021    Chief Complaint: SOB     Hospital Course: H & P reviewed. Pt admitted with COPD exacerbation, elevated troponin     Subjective:     SOB improving. Almost weaned off O2. Medications:  Reviewed    Infusion Medications    sodium chloride       Scheduled Medications    rivaroxaban  15 mg Oral Dinner    ipratropium-albuterol  1 ampule Inhalation Q4H WA    aspirin  81 mg Oral Daily    methylPREDNISolone  60 mg Intravenous Q12H    sodium chloride flush  10 mL Intravenous 2 times per day    pantoprazole  40 mg Oral QAM AC    atorvastatin  80 mg Oral Nightly     PRN Meds: perflutren lipid microspheres, sodium chloride flush, sodium chloride, senna, acetaminophen **OR** acetaminophen, potassium chloride **OR** potassium alternative oral replacement **OR** potassium chloride, magnesium sulfate, promethazine **OR** [DISCONTINUED] ondansetron, ipratropium-albuterol      Intake/Output Summary (Last 24 hours) at 4/15/2021 1119  Last data filed at 4/15/2021 0839  Gross per 24 hour   Intake 1200 ml   Output 800 ml   Net 400 ml       Physical Exam Performed:    /73   Pulse 78   Temp 97.8 °F (36.6 °C) (Oral)   Resp 17   Ht 6' 5\" (1.956 m)   Wt 202 lb 6.4 oz (91.8 kg)   SpO2 95%   BMI 24.00 kg/m²     General appearance: No apparent distress, appears stated age and cooperative. HEENT: Pupils equal, round,. Conjunctivae/corneas clear. Neck: Supple, with full range of motion. No jugular venous distention. Trachea midline. Respiratory:  Normal respiratory effort. Diminished, slightly improved air movement, faint wheezing. Cardiovascular: Regular rate and rhythm with normal S1/S2 without murmurs, rubs or gallops. Abdomen: Soft, non-tender, non-distended with normal bowel sounds. Musculoskeletal: No clubbing, cyanosis or edema bilaterally.     Skin: warm and dry   Neurologic:  Alert speech clear with no overt facial droop  Psychiatric: Alert and oriented, thought content appropriate, normal insight      Labs:   No results for input(s): WBC, HGB, HCT, PLT in the last 72 hours. Recent Labs     04/13/21  0544 04/14/21  0533 04/15/21  0703    137 140   K 4.9 5.2* 5.2*   CL 99 100 100   CO2 30 30 34*   BUN 57* 49* 45*   CREATININE 2.4* 1.9* 1.6*   CALCIUM 9.4 9.1 9.2     No results for input(s): AST, ALT, BILIDIR, BILITOT, ALKPHOS in the last 72 hours. No results for input(s): INR in the last 72 hours. No results for input(s): Ethelene Soulier in the last 72 hours. Urinalysis:      Lab Results   Component Value Date    NITRU Negative 04/14/2021    WBCUA 3-5 04/14/2021    BACTERIA Rare 04/14/2021    RBCUA 3-4 04/14/2021    BLOODU Negative 04/14/2021    SPECGRAV >=1.030 04/14/2021    GLUCOSEU Negative 04/14/2021    GLUCOSEU NEGATIVE 03/27/2012       Radiology:  NM Cardiac Stress Test Nuclear Imaging   Final Result      CT CARDIAC CALCIUM SCORING   Final Result   Total Agatston calcium score of 207 indicates moderate plaque burden as   described below. Patient in the 40th percentile for age. No incidental clinically important extracardiac CT findings. Calcium Score Interpretation      0  No identifiable atherosclerotic plaque. Very low cardiovascular disease   risk. Less than 5% chance of presence of coronary artery disease. A   negative examination. 1-10 Minimal plaque burden. Significant coronary artery disease very   unlikely.  Mild plaque burden. Likely mild or minimal coronary stenosis. 101-400 Moderate plaque burden. Moderate non-obstructive coronary artery   disease highly likely. Over 400 Extensive plaque burden. High likelihood of at least one   significant coronary artery stenosis (>50% diameter). CALCIUM SCORING OVERVIEW:      Coronary calcium is a marker for plaque in a blood vessel or atherosclerosis   (hardening of the arteries).   The presence and amount of calcium detected in   the coronary artery by the CT scan estimates the presence and amount of   atherosclerotic plaque. These calcium deposits can appear years before the   development of heart disease symptoms such as chest pain and shortness of   breath. A calcium score is computed for each of the coronary arteries based upon the   volume and density of the calcium deposits. This can be referred to as your   calcified plaque burden. It does not correspond directly to the percentage   of narrowing in the artery, but does correlate with the severity of the   overall coronary atherosclerotic burden. This score is then used to determine the calcium percentile which compares   your calcified plaque burden to that of other asymptomatic men and women of   the same age. The calcium score, in combination with the percentile, enables   your physician to determine your risk of developing symptomatic coronary   artery disease, and to measure the progression of disease as well as the   effectiveness of treatment. A score of zero indicates that there is no calcified plaque burden. This   implies that there is no significant coronary artery narrowing and very low   likelihood of a cardiac event over at least the next 3 years. It does not   absolutely rule out the presence of soft, noncalcified plaque or totally   eliminate the possibility of a cardiac event. A score greater than zero indicates at least some coronary artery disease. As the score increases, so does the likelihood of a significant coronary   narrowing and the likelihood of a coronary event over the next 3 years. Similarly, the likelihood of a coronary event increases with increasing   calcium percentiles. CT CHEST PULMONARY EMBOLISM W CONTRAST   Final Result   1. No evidence of pulmonary embolic disease. 2. No acute pulmonary findings. Pulmonary emphysema.    3. Stable mass in the thoracic inlet on the right posterior to the thyroid   gland. 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         XR CHEST PORTABLE   Final Result   1. No acute abnormality. Assessment/Plan:    Active Hospital Problems    Diagnosis    PAF (paroxysmal atrial fibrillation) (HCC) [I48.0]    COPD exacerbation ([facility] [address] [J44.1]    History of pulmonary embolus (PE) [Z86.711]    Hx of deep venous thrombosis [Z86.718]    Prostate cancer ([facility] [address] [C61]    Essential hypertension [I10]     Acute Respiratory Failure with Hypoxia, 2/2 COPD exacerbation: Continue steroids, bronchodilators, Acapella. Wean O2 as tolerated. Almost off of O2 and anticipate he will not need home O2 at this point. SOB: Likely due to above. CXR non acute, CTA chest neg for acute PE. Pro BNP elevated but no pulm edema noted on CT, not volume overloaded on exam. Diuretics held. S/p recent COVID vaccine on 4/4/2021. Supportive care for COPD as stated above     Elevated trop with abnormal EKG: concerning for ACS. Trop downtrended. Report recent stress test at [place] about 6 months ago which was abnormal.  Pt denied any chest pain. Cardio consulted. Continue aspirin, statin, xarelto, BB. NM stress test was negative. QUITA: Nephrology consulted. Hold Losartan. BP regimen adjusted. Improving. Monitor. Hx of A fib : rate controlled. Continue xarelto; dose reduced for QUITA.  Continue BB       DVT Prophylaxis: xarelto  Diet: DIET GENERAL; No Caffeine  Dietary Nutrition Supplements: Standard High Calorie Oral Supplement  Code Status: Full Code    PT/OT Eval Status: not indicated at this time     Dispo - Likely home tomorrow without O2    Eusebio Alexandre MD

## 2021-04-15 NOTE — PROGRESS NOTES
Comprehensive Nutrition Assessment    Type and Reason for Visit:  Reassess    Nutrition Recommendations/Plan:   1. Continue general, no caffeine diet   2. Continue Ensure with meals   3. Encourage PO and protein intakes   4. May benefit from bowel regimen - MD to advise   5. Monitor nutrition adequacy, pertinent labs, bowel habits, wt changes, and clinical progress    Nutrition Assessment:  Follow up: S/p stress test. Pt nutritionally improving AEB increased PO intakes. Pt reports appetite improving since previous assessment. Reordered ONS on 4/14, discontinued 2/2 stress test. No c/o difficulty chewing or weakness this date. Encouraged continued good PO intakes. No further nutrition questions or concerns at this time. Malnutrition Assessment:  Malnutrition Status: At risk for malnutrition (Comment)    Context:  Acute Illness       Estimated Daily Nutrient Needs:  Energy (kcal):  4487-3182 kcal; Weight Used for Energy Requirements:  Current(90 kg)     Protein (g):   g; Weight Used for Protein Requirements:  Current(1.0-1.2 g/kg)        Fluid (ml/day):   ; Method Used for Fluid Requirements:  1 ml/kcal      Nutrition Related Findings:  Trace BLE edema. No BM recorded. Hyperkalemic.       Wounds:  None       Current Nutrition Therapies:    DIET GENERAL; No Caffeine  Dietary Nutrition Supplements: Standard High Calorie Oral Supplement    Anthropometric Measures:  · Height: 6' 5\" (195.6 cm)  · Current Body Weight: 202 lb (91.6 kg)   · Admission Body Weight: 201 lb (91.2 kg)    · Usual Body Weight: (215-220 lb per pt)     · Ideal Body Weight: 208 lbs; % Ideal Body Weight 95.7 %   · BMI: 23.9  · BMI Categories: Normal Weight (BMI 22.0 to 24.9) age over 72       Nutrition Diagnosis:   · Inadequate oral intake related to inadequate protein-energy intake as evidenced by poor intake prior to admission, weight loss      Nutrition Interventions:   Food and/or Nutrient Delivery:  Continue Current Diet, Continue Oral Nutrition Supplement  Nutrition Education/Counseling:  Education initiated(High protein/high calorie)   Coordination of Nutrition Care:  Continue to monitor while inpatient    Goals:  Pt will consume greater than 50% of meals and ONS this admission w/o further weight loss       Nutrition Monitoring and Evaluation:   Behavioral-Environmental Outcomes:  None Identified   Food/Nutrient Intake Outcomes:  Food and Nutrient Intake, Supplement Intake  Physical Signs/Symptoms Outcomes:  Nutrition Focused Physical Findings, Weight     Discharge Planning:    Continue current diet, Continue Oral Nutrition Supplement     Electronically signed by Mercedes Agrawal, MS, RD, LD on 4/15/21 at 10:38 AM EDT    Contact: [phone]

## 2021-04-15 NOTE — PROGRESS NOTES
Patient resting comfortably on room air with a saturation of 92%. Patient reported he was up earlier in room ambulating to and from bathroom with complaints of shortness of breath.      Will obtain walk test

## 2021-04-16 LAB — PRO-BNP: 596 PG/ML (ref 0–124)

## 2021-04-16 PROCEDURE — 94640 AIRWAY INHALATION TREATMENT: CPT

## 2021-04-16 PROCEDURE — 2700000000 HC OXYGEN THERAPY PER DAY

## 2021-04-16 PROCEDURE — 6370000000 HC RX 637 (ALT 250 FOR IP): Performed by: INTERNAL MEDICINE

## 2021-04-16 PROCEDURE — 6360000002 HC RX W HCPCS: Performed by: HOSPITALIST

## 2021-04-16 PROCEDURE — 94761 N-INVAS EAR/PLS OXIMETRY MLT: CPT

## 2021-04-16 PROCEDURE — 83880 ASSAY OF NATRIURETIC PEPTIDE: CPT

## 2021-04-16 PROCEDURE — 6370000000 HC RX 637 (ALT 250 FOR IP): Performed by: HOSPITALIST

## 2021-04-16 PROCEDURE — 36415 COLL VENOUS BLD VENIPUNCTURE: CPT

## 2021-04-16 PROCEDURE — 6370000000 HC RX 637 (ALT 250 FOR IP): Performed by: NURSE PRACTITIONER

## 2021-04-16 PROCEDURE — 2580000003 HC RX 258: Performed by: HOSPITALIST

## 2021-04-16 PROCEDURE — 94669 MECHANICAL CHEST WALL OSCILL: CPT

## 2021-04-16 PROCEDURE — 1200000000 HC SEMI PRIVATE

## 2021-04-16 RX ADMIN — ATORVASTATIN CALCIUM 80 MG: 80 TABLET, FILM COATED ORAL at 20:53

## 2021-04-16 RX ADMIN — PANTOPRAZOLE SODIUM 40 MG: 40 TABLET, DELAYED RELEASE ORAL at 09:54

## 2021-04-16 RX ADMIN — IPRATROPIUM BROMIDE AND ALBUTEROL SULFATE 1 AMPULE: .5; 3 SOLUTION RESPIRATORY (INHALATION) at 20:28

## 2021-04-16 RX ADMIN — RIVAROXABAN 15 MG: 15 TABLET, FILM COATED ORAL at 17:50

## 2021-04-16 RX ADMIN — ASPIRIN 81 MG: 81 TABLET, COATED ORAL at 09:54

## 2021-04-16 RX ADMIN — Medication 10 ML: at 09:53

## 2021-04-16 RX ADMIN — METHYLPREDNISOLONE SODIUM SUCCINATE 60 MG: 125 INJECTION, POWDER, FOR SOLUTION INTRAMUSCULAR; INTRAVENOUS at 09:53

## 2021-04-16 RX ADMIN — IPRATROPIUM BROMIDE AND ALBUTEROL SULFATE 1 AMPULE: .5; 3 SOLUTION RESPIRATORY (INHALATION) at 16:25

## 2021-04-16 RX ADMIN — IPRATROPIUM BROMIDE AND ALBUTEROL SULFATE 1 AMPULE: .5; 3 SOLUTION RESPIRATORY (INHALATION) at 08:10

## 2021-04-16 RX ADMIN — IPRATROPIUM BROMIDE AND ALBUTEROL SULFATE 1 AMPULE: .5; 3 SOLUTION RESPIRATORY (INHALATION) at 12:30

## 2021-04-16 NOTE — PROGRESS NOTES
Hospitalist Progress Note      PCP: Vanessa Arthur MD    Date of Admission: 4/10/2021    Chief Complaint: SOB     Hospital Course: H & P reviewed. Pt admitted with COPD exacerbation, elevated troponin     Subjective:     SOB improved. Stable off O2 at rest. Dropped to low 80s with exertion. Medications:  Reviewed    Infusion Medications    sodium chloride       Scheduled Medications    rivaroxaban  15 mg Oral Dinner    ipratropium-albuterol  1 ampule Inhalation Q4H WA    aspirin  81 mg Oral Daily    methylPREDNISolone  60 mg Intravenous Q12H    sodium chloride flush  10 mL Intravenous 2 times per day    pantoprazole  40 mg Oral QAM AC    atorvastatin  80 mg Oral Nightly     PRN Meds: perflutren lipid microspheres, sodium chloride flush, sodium chloride, senna, acetaminophen **OR** acetaminophen, potassium chloride **OR** potassium alternative oral replacement **OR** potassium chloride, magnesium sulfate, promethazine **OR** [DISCONTINUED] ondansetron, ipratropium-albuterol      Intake/Output Summary (Last 24 hours) at 4/16/2021 1930  Last data filed at 4/16/2021 1451  Gross per 24 hour   Intake 480 ml   Output --   Net 480 ml       Physical Exam Performed:    /74   Pulse 80   Temp 98.2 °F (36.8 °C)   Resp 16   Ht 6' 5\" (1.956 m)   Wt 202 lb 1 oz (91.7 kg)   SpO2 98%   BMI 23.96 kg/m²     General appearance: No apparent distress, appears stated age and cooperative. HEENT: Pupils equal, round,. Conjunctivae/corneas clear. Neck: Supple, with full range of motion. No jugular venous distention. Trachea midline. Respiratory:  Normal respiratory effort. Diminished, slightly improved air movement, faint wheezing. Cardiovascular: Regular rate and rhythm with normal S1/S2 without murmurs, rubs or gallops. Abdomen: Soft, non-tender, non-distended with normal bowel sounds. Musculoskeletal: No clubbing, cyanosis or edema bilaterally.     Skin: warm and dry   Neurologic:  Alert speech clear with no overt facial droop  Psychiatric: Alert and oriented, thought content appropriate, normal insight      Labs:   No results for input(s): WBC, HGB, HCT, PLT in the last 72 hours. Recent Labs     04/14/21  0533 04/15/21  0703    140   K 5.2* 5.2*    100   CO2 30 34*   BUN 49* 45*   CREATININE 1.9* 1.6*   CALCIUM 9.1 9.2     No results for input(s): AST, ALT, BILIDIR, BILITOT, ALKPHOS in the last 72 hours. No results for input(s): INR in the last 72 hours. No results for input(s): Raheem Pippins in the last 72 hours. Urinalysis:      Lab Results   Component Value Date    NITRU Negative 04/14/2021    WBCUA 3-5 04/14/2021    BACTERIA Rare 04/14/2021    RBCUA 3-4 04/14/2021    BLOODU Negative 04/14/2021    SPECGRAV >=1.030 04/14/2021    GLUCOSEU Negative 04/14/2021    GLUCOSEU NEGATIVE 03/27/2012       Radiology:  NM Cardiac Stress Test Nuclear Imaging   Final Result      CT CARDIAC CALCIUM SCORING   Final Result   Total Agatston calcium score of 207 indicates moderate plaque burden as   described below. Patient in the 40th percentile for age. No incidental clinically important extracardiac CT findings. Calcium Score Interpretation      0  No identifiable atherosclerotic plaque. Very low cardiovascular disease   risk. Less than 5% chance of presence of coronary artery disease. A   negative examination. 1-10 Minimal plaque burden. Significant coronary artery disease very   unlikely.  Mild plaque burden. Likely mild or minimal coronary stenosis. 101-400 Moderate plaque burden. Moderate non-obstructive coronary artery   disease highly likely. Over 400 Extensive plaque burden. High likelihood of at least one   significant coronary artery stenosis (>50% diameter). CALCIUM SCORING OVERVIEW:      Coronary calcium is a marker for plaque in a blood vessel or atherosclerosis   (hardening of the arteries).   The presence and amount of calcium detected in the coronary artery by the CT scan estimates the presence and amount of   atherosclerotic plaque. These calcium deposits can appear years before the   development of heart disease symptoms such as chest pain and shortness of   breath. A calcium score is computed for each of the coronary arteries based upon the   volume and density of the calcium deposits. This can be referred to as your   calcified plaque burden. It does not correspond directly to the percentage   of narrowing in the artery, but does correlate with the severity of the   overall coronary atherosclerotic burden. This score is then used to determine the calcium percentile which compares   your calcified plaque burden to that of other asymptomatic men and women of   the same age. The calcium score, in combination with the percentile, enables   your physician to determine your risk of developing symptomatic coronary   artery disease, and to measure the progression of disease as well as the   effectiveness of treatment. A score of zero indicates that there is no calcified plaque burden. This   implies that there is no significant coronary artery narrowing and very low   likelihood of a cardiac event over at least the next 3 years. It does not   absolutely rule out the presence of soft, noncalcified plaque or totally   eliminate the possibility of a cardiac event. A score greater than zero indicates at least some coronary artery disease. As the score increases, so does the likelihood of a significant coronary   narrowing and the likelihood of a coronary event over the next 3 years. Similarly, the likelihood of a coronary event increases with increasing   calcium percentiles. CT CHEST PULMONARY EMBOLISM W CONTRAST   Final Result   1. No evidence of pulmonary embolic disease. 2. No acute pulmonary findings. Pulmonary emphysema. 3. Stable mass in the thoracic inlet on the right posterior to the thyroid   gland. O2 this quickly and now report he will need to wait until Monday. Alternatively, if his O2 requirement with exertion resolves over the weekend, he could be discharged without O2.      Beto Llanes MD

## 2021-04-16 NOTE — PROGRESS NOTES
Oxygen documentation:     O2 saturation at REST on ROOM AIR = __94____%     If saturation is 89% or above please proceed with steps 2 and 3.      O2 saturation with AMBULATION of ___8__ feet on ROOM AIR = ___82__%   O2 saturation with AMBULATION on current liter flow = ___91 on 1L___%     DCP notified: ______     Electronically signed by Renay Galeazzi, RN on 4/16/2021 at 12:00 PM

## 2021-04-16 NOTE — PROGRESS NOTES
[facility]    [facility]              [phone]                      He is admitted with shortness of breath. He is found to have COPD exacerbation and also possible non-STEMI which he is being treated. His course is also complicated by hypotensive episodes. we are following for QUITA and related issues. Interval History and plan:      Creatinine down to 1.6  Making a lot of urine  Vitals is stable      Plan:  Okay to discharge from nephrology point of view today  Asked to see patient PCP as soon as possible in about 7 days  Given card to see me in office     Assessment :     Acute Kidney Injury  QUITA likely due to -dynamic changes-hypotension, but he also got contrast on the day of admission, also has been on diuretic which has been  held now  Cr on consultation 2.6  Baseline Cr-1.1 on 3/17  UA-bland from 4/21  Renal Imaging:-CT 2014-no mention of renal abnormality,  No indication to do at this time due to QUITA admitting due to shift and blood pressure and contrast   -History of prostate cancer status post prostatectomy, also has urinary incontinence  Echo: 4/21-normal ejection fraction, mild concentric LVH, grade 1 diastolic dysfunction      Hypertension   BP: (125-156)/(67-73)  Pulse:  [65-86]   BP goal inpatient [id number] systolic inpatient  Blood pressure low on consult  Atrial fibrillation    COPD exacerbation  Elevated troponin  [facility] would like to thank Calixto Saxena MD   for opportunity to serve this patient      Please call with questions at-   24 Hrs Answering service [phone] or  7 am- 5 pm via Perfect serve or cell phone  Dr.Sudhir Ortega Friday          CC/reason for consult :     QUITA     HPI :     Rhea Torres is a 72 y.o. male presented to   the hospital on 4/10/2021 with shortness of breath for several weeks. If any has shortness of breath for some time which got worse after COVID-19 vaccine. He is known to have COPD but not on oxygen dependence. He also has a history of smoking up until several weeks before this hospitalization. He came to the emergency room where an EKG was done which showed possible ST segment depression. His troponin was slightly up BNP was up at 1000. He has had chest x-ray done which showed no acute disease. He had a CT PE done which was negative for thromboembolism. Seen by cardiology and plan for possible angiogram this hospitalization  In the meantime we are consulted for QUITA and related issues    ROS:     Seen with- RN     positives in bold   Constitutional:  fever, chills, weakness, weight change, fatigue  Skin:  rash, pruritus, hair loss, bruising, dry skin, petechiae  Head, Face, Neck   headaches, swelling,  cervical adenopathy  Respiratory: shortness of breath, cough, or wheezing  Cardiovascular: chest pain, palpitations, dizzy, edema  Gastrointestinal: nausea, vomiting, diarrhea, constipation,belly pain    Yellow skin, blood in stool  Musculoskeletal:  back pain, muscle weakness, gait problems,       joint pain or swelling. Genitourinary:  dysuria, poor urine flow, flank pain, blood in urine  Neurologic:  vertigo, TIA'S, syncope, seizures, focal weakness  Psychosocial:  insomnia, anxiety, or depression.   Additional positive findings: SOB and wheeze much better than on admission                   All other remaining systems are negative or unable to obtain        PMH/PSH/SH/Family History:     Past Medical History:   Diagnosis Date    Abdominal Pain     Anxiety State     BPH (benign prostatic hyperplasia) 10/3/2013    Cancer ([facility] [address] 2014    prostate    Chest pain     Colon polyp 3/27/2012    DVT, lower extremity ([address]     Left Leg    Hernia 1/24/2014    HYPERTENSION     PE (pulmonary embolism) 2/7/2014    Pt denies    Prostatitis, Hx of     SBO (small bowel obstruction) ([facility] [address] 1/24/2014       Past Surgical History:   Procedure Laterality Date    HERNIA REPAIR      INGUINAL HERNIA REPAIR Right 1/23/14 incarcerated right inguinal hernia repair    OTHER SURGICAL HISTORY  1/16/2014    ROBOTIC ASSISTED LAPAROSCOPIC PROSTATECTOMY    PROSTATE BIOPSY      TONSILLECTOMY          reports that he quit smoking about 4 years ago. He smoked 0.00 packs per day for 44.00 years. He has never used smokeless tobacco. He reports current alcohol use. He reports that he does not use drugs. family history includes Cancer in his brother, brother, father, mother, and sister.          Medication:     Current Facility-Administered Medications: rivaroxaban (XARELTO) tablet 15 mg, 15 mg, Oral, Dinner  ipratropium-albuterol (DUONEB) nebulizer solution 1 ampule, 1 ampule, Inhalation, Q4H WA  aspirin EC tablet 81 mg, 81 mg, Oral, Daily  methylPREDNISolone sodium (SOLU-MEDROL) injection 60 mg, 60 mg, Intravenous, Q12H  perflutren lipid microspheres (DEFINITY) injection 1.65 mg, 1.5 mL, Intravenous, ONCE PRN  sodium chloride flush 0.9 % injection 10 mL, 10 mL, Intravenous, 2 times per day  sodium chloride flush 0.9 % injection 10 mL, 10 mL, Intravenous, PRN  0.9 % sodium chloride infusion, 25 mL, Intravenous, PRN  senna (SENOKOT) tablet 8.6 mg, 1 tablet, Oral, Daily PRN  acetaminophen (TYLENOL) tablet 650 mg, 650 mg, Oral, Q6H PRN **OR** acetaminophen (TYLENOL) suppository 650 mg, 650 mg, Rectal, Q6H PRN  potassium chloride (KLOR-CON M) extended release tablet 40 mEq, 40 mEq, Oral, PRN **OR** potassium bicarb-citric acid (EFFER-K) effervescent tablet 40 mEq, 40 mEq, Oral, PRN **OR** potassium chloride 10 mEq/100 mL IVPB (Peripheral Line), 10 mEq, Intravenous, PRN  magnesium sulfate 2000 mg in 50 mL IVPB premix, 2,000 mg, Intravenous, PRN  promethazine (PHENERGAN) tablet 12.5 mg, 12.5 mg, Oral, Q6H PRN **OR** [DISCONTINUED] ondansetron (ZOFRAN) injection 4 mg, 4 mg, Intravenous, Q6H PRN  pantoprazole (PROTONIX) tablet 40 mg, 40 mg, Oral, QAM AC  atorvastatin (LIPITOR) tablet 80 mg, 80 mg, Oral, Nightly  ipratropium-albuterol (DUONEB) nebulizer solution 1 ampule, 1 ampule, Inhalation, Q4H PRN       Vitals :     Vitals:    04/16/21 0815   BP: (!) 156/73   Pulse: 65   Resp: 18   Temp: 98.2 °F (36.8 °C)   SpO2: 94%       I & O :       Intake/Output Summary (Last 24 hours) at 4/16/2021 1133  Last data filed at 4/16/2021 9260  Gross per 24 hour   Intake 480 ml   Output 100 ml   Net 380 ml        Physical Examination :     General appearance: Anxious- no, distressed- no, in good spirits- yes  HEENT: Lips- normal, teeth- ok , oral mucosa- moist  Neck : Mass- no, appears symmetrical, JVD- not visible  Respiratory: Respiratory effort-comfortable on oxygen, wheeze-yes, crackles -no  Cardiovascular: Ausculation- No M/R/G, Edema no  Abdomen: visible mass- no, distention- no, scar- no, tenderness- no                            hepatosplenomegaly-  no  Musculoskeletal:  clubbing no,cyanosis- no , digital ischemia- no                           muscle strength- grossly normal , tone - grossly normal  Skin: rashes- no , ulcers- no, induration- no, tightening - no  Psychiatric:  Judgement and insight- normal           AAO X 3  Additional finding: -      LABS:     No results for input(s): WBC, HGB, HCT, PLT in the last 72 hours.   Recent Labs     04/14/21  0533 04/15/21  0703    140   K 5.2* 5.2*    100   CO2 30 34*   BUN 49* 45*   CREATININE 1.9* 1.6*   GLUCOSE 151* 151*   MG 2.20 2.10

## 2021-04-16 NOTE — CARE COORDINATION
CM faxed orders for new o2 needs to Dr. Yenny Zhao office at [facility]. Spoke with reception who was expecting orders and would give to MD so that pt may be set up for services today as he has a DC order. Primary RN aware. Will provide updates as they become available. Chiquita Parker RN    Addendum [phone]    [facility]; location is closed for the weekend. CM also called [facility] who has a contract with the [facility] for O2 @ [id number]. Spoke with Ernesto Lim in [address] [facility] who states that nothing was received from Dr. Yenny Zhao office today. Ernesto Lim also states that they will not be able to take verbal order or order from hospital to start services. Also spoke with Wu Christine from [place] who states that the only way she can provide DME is if pt is self pay @ $165/ month. There is no way to prorate per coy. Primary RN is discussing options with pt at bedside. Otherwise he will not be able to leave with O2 as indicated until [facility]19 when [facility] with pt decision shortly. Chiquita Parker RN     Addendum [phone]    Spoke with Pt about options as above. Pt states that he would like to stay here until Monday as he is not able to afford self pay rate with [employer]. CM called pt son Pasha Flores per request to tell him pt does not need transport today.  [facility] AM.    Chiquita Parker RN

## 2021-04-16 NOTE — PLAN OF CARE
Patient has remained free from falls throughout the shift. Bed in lowest position and call light within reach. Patient has maintained patent airway throughout the shift requiring no oxygen at this time. Patient's EF (Ejection Fraction) is greater than 40%    Heart Failure Medications:  Diuretics[de-identified] None    (One of the following REQUIRED for EF <40%/SYSTOLIC FAILURE but MAY be used in EF% >40%/DIASTOLIC FAILURE)        ACE[de-identified] None        ARB[de-identified] None         ARNI[de-identified] None    (Beta Blockers)  NON- Evidenced Based Beta Blocker (for EF% >40%/DIASTOLIC FAILURE): None    Evidenced Based Beta Blocker::(REQUIRED for EF% <40%/SYSTOLIC FAILURE) None  . .................................................................................................................................................. Patient's weights and intake/output reviewed: Yes    Patient's Last Weight: 202.6 lbs obtained by standing scale. Difference of .5 lbs less than last documented weight. Intake/Output Summary (Last 24 hours) at 4/16/2021 1104  Last data filed at 4/16/2021 0905  Gross per 24 hour   Intake 480 ml   Output 100 ml   Net 380 ml       Comorbidities Reviewed Yes    Patient has a past medical history of Abdominal Pain, Anxiety State, BPH (benign prostatic hyperplasia), Cancer ([address] Chest pain, Colon polyp, DVT, lower extremity ([address] Hernia, HYPERTENSION, PE (pulmonary embolism), Prostatitis, Hx of, and SBO (small bowel obstruction) ([address] >>For CHF and Comorbidity documentation on Education Time and Topics, please see Education Tab    Progressive Mobility Assessment:  What is this patient's Current Level of Mobility?: Abulatory  How was this patient Mobilized today?: Up in Room                 With Whom? self                 Level of Difficulty/Assistance: independent    Pt resting in bed at this time on room air. Pt with complaints of shortness of breath. Pt with nonpitting lower extremity edema.      Patient and/or Family's stated

## 2021-04-17 LAB
ALBUMIN SERPL-MCNC: 3.1 G/DL (ref 3.4–5)
ANION GAP SERPL CALCULATED.3IONS-SCNC: 5 MMOL/L (ref 3–16)
BUN BLDV-MCNC: 40 MG/DL (ref 7–20)
CALCIUM SERPL-MCNC: 8.7 MG/DL (ref 8.3–10.6)
CHLORIDE BLD-SCNC: 101 MMOL/L (ref 99–110)
CO2: 31 MMOL/L (ref 21–32)
CREAT SERPL-MCNC: 1.3 MG/DL (ref 0.8–1.3)
GFR AFRICAN AMERICAN: >60
GFR NON-AFRICAN AMERICAN: 55
GLUCOSE BLD-MCNC: 92 MG/DL (ref 70–99)
PHOSPHORUS: 2.6 MG/DL (ref 2.5–4.9)
POTASSIUM SERPL-SCNC: 4.3 MMOL/L (ref 3.5–5.1)
SODIUM BLD-SCNC: 137 MMOL/L (ref 136–145)

## 2021-04-17 PROCEDURE — 6370000000 HC RX 637 (ALT 250 FOR IP): Performed by: NURSE PRACTITIONER

## 2021-04-17 PROCEDURE — 94640 AIRWAY INHALATION TREATMENT: CPT

## 2021-04-17 PROCEDURE — 94669 MECHANICAL CHEST WALL OSCILL: CPT

## 2021-04-17 PROCEDURE — 80069 RENAL FUNCTION PANEL: CPT

## 2021-04-17 PROCEDURE — 36415 COLL VENOUS BLD VENIPUNCTURE: CPT

## 2021-04-17 PROCEDURE — 2580000003 HC RX 258: Performed by: HOSPITALIST

## 2021-04-17 PROCEDURE — 6370000000 HC RX 637 (ALT 250 FOR IP): Performed by: HOSPITALIST

## 2021-04-17 PROCEDURE — 6370000000 HC RX 637 (ALT 250 FOR IP): Performed by: INTERNAL MEDICINE

## 2021-04-17 PROCEDURE — 1200000000 HC SEMI PRIVATE

## 2021-04-17 PROCEDURE — 2700000000 HC OXYGEN THERAPY PER DAY

## 2021-04-17 PROCEDURE — 94761 N-INVAS EAR/PLS OXIMETRY MLT: CPT

## 2021-04-17 RX ADMIN — IPRATROPIUM BROMIDE AND ALBUTEROL SULFATE 1 AMPULE: .5; 3 SOLUTION RESPIRATORY (INHALATION) at 12:22

## 2021-04-17 RX ADMIN — ASPIRIN 81 MG: 81 TABLET, COATED ORAL at 09:12

## 2021-04-17 RX ADMIN — Medication 10 ML: at 21:23

## 2021-04-17 RX ADMIN — IPRATROPIUM BROMIDE AND ALBUTEROL SULFATE 1 AMPULE: .5; 3 SOLUTION RESPIRATORY (INHALATION) at 19:47

## 2021-04-17 RX ADMIN — IPRATROPIUM BROMIDE AND ALBUTEROL SULFATE 1 AMPULE: .5; 3 SOLUTION RESPIRATORY (INHALATION) at 08:27

## 2021-04-17 RX ADMIN — RIVAROXABAN 15 MG: 15 TABLET, FILM COATED ORAL at 17:37

## 2021-04-17 RX ADMIN — ATORVASTATIN CALCIUM 80 MG: 80 TABLET, FILM COATED ORAL at 21:22

## 2021-04-17 RX ADMIN — PANTOPRAZOLE SODIUM 40 MG: 40 TABLET, DELAYED RELEASE ORAL at 09:12

## 2021-04-17 RX ADMIN — IPRATROPIUM BROMIDE AND ALBUTEROL SULFATE 1 AMPULE: .5; 3 SOLUTION RESPIRATORY (INHALATION) at 16:10

## 2021-04-17 RX ADMIN — Medication 10 ML: at 09:12

## 2021-04-17 ASSESSMENT — PAIN SCALES - GENERAL: PAINLEVEL_OUTOF10: 0

## 2021-04-17 NOTE — PROGRESS NOTES
Answering service [phone] or  7 am- 5 pm via Perfect serve or cell phone  Inga Chong          CC/reason for consult :     QUITA     HPI :     Onelia Marks is a 72 y.o. male presented to   the hospital on 4/10/2021 with shortness of breath for several weeks. If any has shortness of breath for some time which got worse after COVID-19 vaccine. He is known to have COPD but not on oxygen dependence. He also has a history of smoking up until several weeks before this hospitalization. He came to the emergency room where an EKG was done which showed possible ST segment depression. His troponin was slightly up BNP was up at 1000. He has had chest x-ray done which showed no acute disease. He had a CT PE done which was negative for thromboembolism. Seen by cardiology and plan for possible angiogram this hospitalization  In the meantime we are consulted for QUITA and related issues    ROS:     Seen with- RN     positives in bold   Constitutional:  fever, chills, weakness, weight change, fatigue  Skin:  rash, pruritus, hair loss, bruising, dry skin, petechiae  Head, Face, Neck   headaches, swelling,  cervical adenopathy  Respiratory: shortness of breath, cough, or wheezing  Cardiovascular: chest pain, palpitations, dizzy, edema  Gastrointestinal: nausea, vomiting, diarrhea, constipation,belly pain    Yellow skin, blood in stool  Musculoskeletal:  back pain, muscle weakness, gait problems,       joint pain or swelling. Genitourinary:  dysuria, poor urine flow, flank pain, blood in urine  Neurologic:  vertigo, TIA'S, syncope, seizures, focal weakness  Psychosocial:  insomnia, anxiety, or depression.   Additional positive findings: SOB and wheeze much better than on admission                   All other remaining systems are negative or unable to obtain        PMH/PSH/SH/Family History:     Past Medical History:   Diagnosis Date    Abdominal Pain     Anxiety State     BPH (benign prostatic hyperplasia) 10/3/2013    Cancer ([facility] [address] 2014    prostate    Chest pain     Colon polyp 3/27/2012    DVT, lower extremity ([facility] [address]     Left Leg    Hernia 1/24/2014    HYPERTENSION     PE (pulmonary embolism) 2/7/2014    Pt denies    Prostatitis, Hx of     SBO (small bowel obstruction) ([facility] [address] 1/24/2014       Past Surgical History:   Procedure Laterality Date    HERNIA REPAIR      INGUINAL HERNIA REPAIR Right 1/23/14    incarcerated right inguinal hernia repair    OTHER SURGICAL HISTORY  1/16/2014    ROBOTIC ASSISTED LAPAROSCOPIC PROSTATECTOMY    PROSTATE BIOPSY      TONSILLECTOMY          reports that he quit smoking about 4 years ago. He smoked 0.00 packs per day for 44.00 years. He has never used smokeless tobacco. He reports current alcohol use. He reports that he does not use drugs. family history includes Cancer in his brother, brother, father, mother, and sister.          Medication:     Current Facility-Administered Medications: rivaroxaban (XARELTO) tablet 15 mg, 15 mg, Oral, Dinner  ipratropium-albuterol (DUONEB) nebulizer solution 1 ampule, 1 ampule, Inhalation, Q4H WA  aspirin EC tablet 81 mg, 81 mg, Oral, Daily  perflutren lipid microspheres (DEFINITY) injection 1.65 mg, 1.5 mL, Intravenous, ONCE PRN  sodium chloride flush 0.9 % injection 10 mL, 10 mL, Intravenous, 2 times per day  sodium chloride flush 0.9 % injection 10 mL, 10 mL, Intravenous, PRN  0.9 % sodium chloride infusion, 25 mL, Intravenous, PRN  senna (SENOKOT) tablet 8.6 mg, 1 tablet, Oral, Daily PRN  acetaminophen (TYLENOL) tablet 650 mg, 650 mg, Oral, Q6H PRN **OR** acetaminophen (TYLENOL) suppository 650 mg, 650 mg, Rectal, Q6H PRN  potassium chloride (KLOR-CON M) extended release tablet 40 mEq, 40 mEq, Oral, PRN **OR** potassium bicarb-citric acid (EFFER-K) effervescent tablet 40 mEq, 40 mEq, Oral, PRN **OR** potassium chloride 10 mEq/100 mL IVPB (Peripheral Line), 10 mEq, Intravenous, PRN  magnesium sulfate 2000 mg in 50 mL IVPB premix, 2,000 mg, Intravenous, PRN  promethazine (PHENERGAN) tablet 12.5 mg, 12.5 mg, Oral, Q6H PRN **OR** [DISCONTINUED] ondansetron (ZOFRAN) injection 4 mg, 4 mg, Intravenous, Q6H PRN  pantoprazole (PROTONIX) tablet 40 mg, 40 mg, Oral, QAM AC  atorvastatin (LIPITOR) tablet 80 mg, 80 mg, Oral, Nightly  ipratropium-albuterol (DUONEB) nebulizer solution 1 ampule, 1 ampule, Inhalation, Q4H PRN       Vitals :     Vitals:    04/17/21 0830   BP: 134/75   Pulse: 73   Resp: 18   Temp: 97.5 °F (36.4 °C)   SpO2: 92%       I & O :       Intake/Output Summary (Last 24 hours) at 4/17/2021 0954  Last data filed at 4/17/2021 0009  Gross per 24 hour   Intake 240 ml   Output 700 ml   Net -460 ml        Physical Examination :     General appearance: Anxious- no, distressed- no, in good spirits- yes  HEENT: Lips- normal, teeth- ok , oral mucosa- moist  Neck : Mass- no, appears symmetrical, JVD- not visible  Respiratory: Respiratory effort-comfortable on oxygen, wheeze-yes, crackles -no  Cardiovascular: Ausculation- No M/R/G, Edema no  Abdomen: visible mass- no, distention- no, scar- no, tenderness- no                            hepatosplenomegaly-  no  Musculoskeletal:  clubbing no,cyanosis- no , digital ischemia- no                           muscle strength- grossly normal , tone - grossly normal  Skin: rashes- no , ulcers- no, induration- no, tightening - no  Psychiatric:  Judgement and insight- normal           AAO X 3  Additional finding: -      LABS:     No results for input(s): WBC, HGB, HCT, PLT in the last 72 hours.   Recent Labs     04/15/21  0703 04/17/21  0832    137   K 5.2* 4.3    101   CO2 34* 31   BUN 45* 40*   CREATININE 1.6* 1.3   GLUCOSE 151* 92   MG 2.10  --    PHOS  --  2.6

## 2021-04-17 NOTE — PROGRESS NOTES
Hospitalist Progress Note      PCP: Sintia Leigh MD    Date of Admission: 4/10/2021    Chief Complaint: SOB     Hospital Course: Medically shortness of breath, diagnosed with COPD exacerbation. [facility] Patient will be kept in the hospital until then. Subjective:     Improving shortness of breath. Still requires oxygen. No chest pain, no shortness of breath otherwise, no nausea or vomiting. Medications:  Reviewed    Infusion Medications    sodium chloride       Scheduled Medications    rivaroxaban  15 mg Oral Dinner    ipratropium-albuterol  1 ampule Inhalation Q4H WA    aspirin  81 mg Oral Daily    sodium chloride flush  10 mL Intravenous 2 times per day    pantoprazole  40 mg Oral QAM AC    atorvastatin  80 mg Oral Nightly     PRN Meds: perflutren lipid microspheres, sodium chloride flush, sodium chloride, senna, acetaminophen **OR** acetaminophen, potassium chloride **OR** potassium alternative oral replacement **OR** potassium chloride, magnesium sulfate, promethazine **OR** [DISCONTINUED] ondansetron, ipratropium-albuterol      Intake/Output Summary (Last 24 hours) at 4/17/2021 1510  Last data filed at 4/17/2021 1324  Gross per 24 hour   Intake 240 ml   Output 1200 ml   Net -960 ml       Physical Exam Performed:    /61   Pulse 72   Temp 97.8 °F (36.6 °C) (Oral)   Resp 16   Ht 6' 5\" (1.956 m)   Wt 203 lb 3.2 oz (92.2 kg)   SpO2 95%   BMI 24.10 kg/m²     General appearance: No apparent distress, appears stated age and cooperative. HEENT: Pupils equal, round,. Conjunctivae/corneas clear. Neck: Supple, with full range of motion. No jugular venous distention. Trachea midline. Respiratory:  Normal respiratory effort. Diminished, slightly improved air movement, no wheezes today  Cardiovascular: Regular rate and rhythm with normal S1/S2 without murmurs, rubs or gallops.   Abdomen: Soft, non-tender, non-distended with normal bowel Subjective:      Patient ID: Misha Simpson is a 65 y.o. male.    Chief Complaint: Follow-up (right great toe fracture  PCP  Trino Snyder  8/6/18) and Diabetes Mellitus (A1c  10.2   3/4/19)    Misha is a 65 y.o. male who presents to the podiatry clinic  with complaint of  right foot pain great toe. Onset of the symptoms was several days ago. Precipitating event: hammer fell onto his toe. Current symptoms include: ability to bear weight, but with some pain. Aggravating factors: any weight bearing. Symptoms have gradually improved.  Evaluation to date: plain films: comminuted fracture to the distal phalanx right great toe. Treatment to date: ambulating in a darco shoe. Patients rates pain 7/10 on pain scale.    10/4/18: Patient returns for follow up right great toe fracture ambulating in a darco shoe for the last 6 weeks, although he admits to working in his boots without the darco on about every day. There is some pain in the toe still but much improved overall. No new pedal complaints.     3/8/19: Patient returns for follow up right great toe fracture now back in normal shoes ambulating no acute pain to the toe, doing very well.    Review of Systems   Constitution: Negative for chills and fever.   Cardiovascular: Negative for claudication and leg swelling.   Respiratory: Negative for shortness of breath.    Skin: Positive for nail changes. Negative for itching and rash.   Musculoskeletal: Negative for muscle cramps, muscle weakness and myalgias.   Gastrointestinal: Negative for nausea and vomiting.   Neurological: Negative for focal weakness, loss of balance, numbness and paresthesias.           Objective:      Physical Exam   Constitutional: He is oriented to person, place, and time. He appears well-developed and well-nourished. No distress.   Cardiovascular:   Pulses:       Dorsalis pedis pulses are 2+ on the right side, and 2+ on the left side.        Posterior tibial pulses are 2+ on the right side, and 2+ on  sounds. Musculoskeletal: No clubbing, cyanosis or edema bilaterally. Skin: warm and dry   Neurologic:  Alert speech clear with no overt facial droop. No focal signs. Psychiatric: Alert and oriented, thought content appropriate, normal insight        Labs:   No results for input(s): WBC, HGB, HCT, PLT in the last 72 hours. Recent Labs     04/15/21  0703 04/17/21  0832    137   K 5.2* 4.3    101   CO2 34* 31   BUN 45* 40*   CREATININE 1.6* 1.3   CALCIUM 9.2 8.7   PHOS  --  2.6     No results for input(s): AST, ALT, BILIDIR, BILITOT, ALKPHOS in the last 72 hours. No results for input(s): INR in the last 72 hours. No results for input(s): Ricardo Ang in the last 72 hours. Urinalysis:      Lab Results   Component Value Date    NITRU Negative 04/14/2021    WBCUA 3-5 04/14/2021    BACTERIA Rare 04/14/2021    RBCUA 3-4 04/14/2021    BLOODU Negative 04/14/2021    SPECGRAV >=1.030 04/14/2021    GLUCOSEU Negative 04/14/2021    GLUCOSEU NEGATIVE 03/27/2012       Radiology:  NM Cardiac Stress Test Nuclear Imaging   Final Result      CT CARDIAC CALCIUM SCORING   Final Result   Total Agatston calcium score of 207 indicates moderate plaque burden as   described below. Patient in the 40th percentile for age. No incidental clinically important extracardiac CT findings. Calcium Score Interpretation      0  No identifiable atherosclerotic plaque. Very low cardiovascular disease   risk. Less than 5% chance of presence of coronary artery disease. A   negative examination. 1-10 Minimal plaque burden. Significant coronary artery disease very   unlikely.  Mild plaque burden. Likely mild or minimal coronary stenosis. 101-400 Moderate plaque burden. Moderate non-obstructive coronary artery   disease highly likely. Over 400 Extensive plaque burden. High likelihood of at least one   significant coronary artery stenosis (>50% diameter).       CALCIUM SCORING OVERVIEW: the left side.   < 3 sec capillary refill time to toes 1-5 bilateral. Toes and feet are warm to touch proximally with normal distal cooling b/l. There is some hair growth on the feet and toes b/l. There is no edema b/l. No spider veins or varicosities present b/l.      Musculoskeletal:   There is no pain with ROM at the IPJ of the right great toe.    Equinus noted b/l ankles with < 10 deg DF noted. MMT 5/5 in DF/PF/Inv/Ev resistance with no reproduction of pain in any direction. Passive range of motion of ankle and pedal joints is painless b/l.     Neurological: He is alert and oriented to person, place, and time. He has normal strength. He displays no atrophy and no tremor. No sensory deficit. He exhibits normal muscle tone.   Negative tinel sign bilateral.   Skin: Skin is warm, dry and intact. No abrasion, no bruising, no burn, no ecchymosis, no laceration, no lesion, no petechiae and no rash noted. He is not diaphoretic. No cyanosis or erythema. No pallor. Nails show no clubbing.   Skin temperature, texture and turgor within normal limits.    Right great toe nail has come off with a new nail growing in at the base, no pain to palpation.    Psychiatric: He has a normal mood and affect. His behavior is normal.             Assessment:       Encounter Diagnoses   Name Primary?    Nondisplaced fracture of distal phalanx of right great toe, subsequent encounter for fracture with routine healing Yes    Type 2 diabetes mellitus without complication, without long-term current use of insulin          Plan:       Misha was seen today for follow-up and diabetes mellitus.    Diagnoses and all orders for this visit:    Nondisplaced fracture of distal phalanx of right great toe, subsequent encounter for fracture with routine healing    Type 2 diabetes mellitus without complication, without long-term current use of insulin      I counseled the patient on his conditions, their implications and medical management.    Patient  Coronary calcium is a marker for plaque in a blood vessel or atherosclerosis   (hardening of the arteries). The presence and amount of calcium detected in   the coronary artery by the CT scan estimates the presence and amount of   atherosclerotic plaque. These calcium deposits can appear years before the   development of heart disease symptoms such as chest pain and shortness of   breath. A calcium score is computed for each of the coronary arteries based upon the   volume and density of the calcium deposits. This can be referred to as your   calcified plaque burden. It does not correspond directly to the percentage   of narrowing in the artery, but does correlate with the severity of the   overall coronary atherosclerotic burden. This score is then used to determine the calcium percentile which compares   your calcified plaque burden to that of other asymptomatic men and women of   the same age. The calcium score, in combination with the percentile, enables   your physician to determine your risk of developing symptomatic coronary   artery disease, and to measure the progression of disease as well as the   effectiveness of treatment. A score of zero indicates that there is no calcified plaque burden. This   implies that there is no significant coronary artery narrowing and very low   likelihood of a cardiac event over at least the next 3 years. It does not   absolutely rule out the presence of soft, noncalcified plaque or totally   eliminate the possibility of a cardiac event. A score greater than zero indicates at least some coronary artery disease. As the score increases, so does the likelihood of a significant coronary   narrowing and the likelihood of a coronary event over the next 3 years. Similarly, the likelihood of a coronary event increases with increasing   calcium percentiles. CT CHEST PULMONARY EMBOLISM W CONTRAST   Final Result   1.  No evidence of pulmonary doing well, no new complaints    Shoe inspection. Diabetic Foot Education. Patient reminded of the importance of good nutrition and blood sugar control to help prevent podiatric complications of diabetes. Patient instructed on proper foot hygeine. We discussed wearing proper shoe gear, daily foot inspections, never walking without protective shoe gear, never putting sharp instruments to feet    Return 1 year diabetic foot check    Flaco Alas DPM                  embolic disease. 2. No acute pulmonary findings. Pulmonary emphysema. 3. Stable mass in the thoracic inlet on the right posterior to the thyroid   gland. 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         XR CHEST PORTABLE   Final Result   1. No acute abnormality. Assessment/Plan:    Active Hospital Problems    Diagnosis    PAF [facility]paroxysmal atrial fibrillation) ([facility]) [I48.0]    COPD exacerbation ([facility]) [J44.1]    History of pulmonary embolus (PE) [Z86.711]    Hx of deep venous thrombosis [Z86.718]    Prostate cancer ([facility] [address] [C61]    Essential hypertension [I10]     PLAN:    COPD exacerbation  Resolved. Steroid course complete. Ready to discharge, but we have to keep the patient here over the weekend because home oxygen cannot be provided until the 19th. Acute hypoxemic respiratory failure  Resolved. Chronic hypoxemia persists. Patient will be oxygen dependent. Elevated troponin  Most likely demand ischemia secondary to above  Evaluated by cardiology. Cardiac stress test was negative. Continue medical management. Acute kidney injury  Creatinine stable at 1.3. Patient needs to be off losartan indefinitely. Paroxysmal atrial fibrillation  Controlled rate. Continue Xarelto and beta-blocker    DVT Prophylaxis: xarelto  Diet: DIET GENERAL; No Caffeine  Dietary Nutrition Supplements: Standard High Calorie Oral Supplement  Code Status: Full Code    PT/OT Eval Status: not indicated at this time     Dispo -earliest date of discharge is April 19th, when home oxygen can be available through [place].       Felipa Saxena MD

## 2021-04-18 PROCEDURE — 6370000000 HC RX 637 (ALT 250 FOR IP): Performed by: INTERNAL MEDICINE

## 2021-04-18 PROCEDURE — 2700000000 HC OXYGEN THERAPY PER DAY

## 2021-04-18 PROCEDURE — 94669 MECHANICAL CHEST WALL OSCILL: CPT

## 2021-04-18 PROCEDURE — 2580000003 HC RX 258: Performed by: HOSPITALIST

## 2021-04-18 PROCEDURE — 94761 N-INVAS EAR/PLS OXIMETRY MLT: CPT

## 2021-04-18 PROCEDURE — 1200000000 HC SEMI PRIVATE

## 2021-04-18 PROCEDURE — 6370000000 HC RX 637 (ALT 250 FOR IP): Performed by: HOSPITALIST

## 2021-04-18 PROCEDURE — 94640 AIRWAY INHALATION TREATMENT: CPT

## 2021-04-18 PROCEDURE — 6370000000 HC RX 637 (ALT 250 FOR IP): Performed by: NURSE PRACTITIONER

## 2021-04-18 RX ADMIN — Medication 10 ML: at 21:35

## 2021-04-18 RX ADMIN — ATORVASTATIN CALCIUM 80 MG: 80 TABLET, FILM COATED ORAL at 21:35

## 2021-04-18 RX ADMIN — ASPIRIN 81 MG: 81 TABLET, COATED ORAL at 09:47

## 2021-04-18 RX ADMIN — RIVAROXABAN 15 MG: 15 TABLET, FILM COATED ORAL at 17:30

## 2021-04-18 RX ADMIN — PANTOPRAZOLE SODIUM 40 MG: 40 TABLET, DELAYED RELEASE ORAL at 09:47

## 2021-04-18 RX ADMIN — IPRATROPIUM BROMIDE AND ALBUTEROL SULFATE 1 AMPULE: .5; 3 SOLUTION RESPIRATORY (INHALATION) at 07:54

## 2021-04-18 RX ADMIN — IPRATROPIUM BROMIDE AND ALBUTEROL SULFATE 1 AMPULE: .5; 3 SOLUTION RESPIRATORY (INHALATION) at 12:27

## 2021-04-18 RX ADMIN — IPRATROPIUM BROMIDE AND ALBUTEROL SULFATE 1 AMPULE: .5; 3 SOLUTION RESPIRATORY (INHALATION) at 16:41

## 2021-04-18 RX ADMIN — Medication 10 ML: at 09:48

## 2021-04-18 NOTE — PROGRESS NOTES
Hospitalist Progress Note      PCP: Raphael Vogt MD    Date of Admission: 4/10/2021    Chief Complaint: SOB     Hospital Course: Medically shortness of breath, diagnosed with COPD exacerbation. [facility]. Patient will be kept in the hospital until then. No changes overnight    Subjective:     Improving shortness of breath. Still requires oxygen. No chest pain, no shortness of breath otherwise, no nausea or vomiting. Overall patient is stable. Medications:  Reviewed    Infusion Medications    sodium chloride       Scheduled Medications    rivaroxaban  15 mg Oral Dinner    ipratropium-albuterol  1 ampule Inhalation Q4H WA    aspirin  81 mg Oral Daily    sodium chloride flush  10 mL Intravenous 2 times per day    pantoprazole  40 mg Oral QAM AC    atorvastatin  80 mg Oral Nightly     PRN Meds: perflutren lipid microspheres, sodium chloride flush, sodium chloride, senna, acetaminophen **OR** acetaminophen, potassium chloride **OR** potassium alternative oral replacement **OR** potassium chloride, magnesium sulfate, promethazine **OR** [DISCONTINUED] ondansetron, ipratropium-albuterol      Intake/Output Summary (Last 24 hours) at 4/18/2021 1310  Last data filed at 4/18/2021 1132  Gross per 24 hour   Intake 1200 ml   Output 1500 ml   Net -300 ml       Physical Exam Performed:    BP (!) 100/53   Pulse 79   Temp 98.2 °F (36.8 °C) (Oral)   Resp 14   Ht 6' 5\" (1.956 m)   Wt 205 lb 9.6 oz (93.3 kg)   SpO2 94%   BMI 24.38 kg/m²     General appearance: No apparent distress, appears stated age and cooperative. HEENT: Pupils equal, round,. Conjunctivae/corneas clear. Neck: Supple, with full range of motion. No jugular venous distention. Trachea midline. Respiratory:  Normal respiratory effort. Diminished, slightly improved air movement, no wheezes today  Cardiovascular: Regular rate and rhythm with normal S1/S2 without murmurs, rubs or gallops.   Abdomen: Soft, non-tender, non-distended with normal bowel sounds. Musculoskeletal: No clubbing, cyanosis or edema bilaterally. Skin: warm and dry   Neurologic:  Alert speech clear with no overt facial droop. No focal signs. Psychiatric: Alert and oriented, thought content appropriate, normal insight    I examined the patient today (04/18/21). Physical exam is similar to yesterday (4/17). Labs:   No results for input(s): WBC, HGB, HCT, PLT in the last 72 hours. Recent Labs     04/17/21  0832      K 4.3      CO2 31   BUN 40*   CREATININE 1.3   CALCIUM 8.7   PHOS 2.6     No results for input(s): AST, ALT, BILIDIR, BILITOT, ALKPHOS in the last 72 hours. No results for input(s): INR in the last 72 hours. No results for input(s): Rejeana Kemps in the last 72 hours. Urinalysis:      Lab Results   Component Value Date    NITRU Negative 04/14/2021    WBCUA 3-5 04/14/2021    BACTERIA Rare 04/14/2021    RBCUA 3-4 04/14/2021    BLOODU Negative 04/14/2021    SPECGRAV >=1.030 04/14/2021    GLUCOSEU Negative 04/14/2021    GLUCOSEU NEGATIVE 03/27/2012       Radiology:  NM Cardiac Stress Test Nuclear Imaging   Final Result      CT CARDIAC CALCIUM SCORING   Final Result   Total Agatston calcium score of 207 indicates moderate plaque burden as   described below. Patient in the 40th percentile for age. No incidental clinically important extracardiac CT findings. Calcium Score Interpretation      0  No identifiable atherosclerotic plaque. Very low cardiovascular disease   risk. Less than 5% chance of presence of coronary artery disease. A   negative examination. 1-10 Minimal plaque burden. Significant coronary artery disease very   unlikely.  Mild plaque burden. Likely mild or minimal coronary stenosis. 101-400 Moderate plaque burden. Moderate non-obstructive coronary artery   disease highly likely. Over 400 Extensive plaque burden.   High likelihood of at least one significant coronary artery stenosis (>50% diameter). CALCIUM SCORING OVERVIEW:      Coronary calcium is a marker for plaque in a blood vessel or atherosclerosis   (hardening of the arteries). The presence and amount of calcium detected in   the coronary artery by the CT scan estimates the presence and amount of   atherosclerotic plaque. These calcium deposits can appear years before the   development of heart disease symptoms such as chest pain and shortness of   breath. A calcium score is computed for each of the coronary arteries based upon the   volume and density of the calcium deposits. This can be referred to as your   calcified plaque burden. It does not correspond directly to the percentage   of narrowing in the artery, but does correlate with the severity of the   overall coronary atherosclerotic burden. This score is then used to determine the calcium percentile which compares   your calcified plaque burden to that of other asymptomatic men and women of   the same age. The calcium score, in combination with the percentile, enables   your physician to determine your risk of developing symptomatic coronary   artery disease, and to measure the progression of disease as well as the   effectiveness of treatment. A score of zero indicates that there is no calcified plaque burden. This   implies that there is no significant coronary artery narrowing and very low   likelihood of a cardiac event over at least the next 3 years. It does not   absolutely rule out the presence of soft, noncalcified plaque or totally   eliminate the possibility of a cardiac event. A score greater than zero indicates at least some coronary artery disease. As the score increases, so does the likelihood of a significant coronary   narrowing and the likelihood of a coronary event over the next 3 years. Similarly, the likelihood of a coronary event increases with increasing   calcium percentiles. CT CHEST PULMONARY EMBOLISM W CONTRAST   Final Result   1. No evidence of pulmonary embolic disease. 2. No acute pulmonary findings. Pulmonary emphysema. 3. Stable mass in the thoracic inlet on the right posterior to the thyroid   gland. 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         XR CHEST PORTABLE   Final Result   1. No acute abnormality. Assessment/Plan:    Active Hospital Problems    Diagnosis    PAF (paroxysmal atrial fibrillation) ([facility]) [I48.0]    COPD exacerbation ([facility]) [J44.1]    History of pulmonary embolus (PE) [Z86.711]    Hx of deep venous thrombosis [Z86.718]    Prostate cancer ([facility] [address] [C61]    Essential hypertension [I10]     PLAN:    COPD exacerbation  Resolved. Steroid course complete. Ready to discharge, but we have to keep the patient here over the weekend because home oxygen cannot be provided until the 19th. Remained stable overnight. Acute hypoxemic respiratory failure  Underlying chronic hypoxemia persists. Patient will be oxygen dependent at the time of discharge. Elevated troponin  Most likely demand ischemia secondary to above  Evaluated by cardiology. Cardiac stress test was negative. Continue medical management. No chest pain    Acute kidney injury  Creatinine stable at 1.3. Patient needs to be off losartan indefinitely. Repeat basic metabolic panel tomorrow    Paroxysmal atrial fibrillation  Controlled rate. Continue Xarelto and beta-blocker    DVT Prophylaxis: xarelto  Diet: DIET GENERAL; No Caffeine  Dietary Nutrition Supplements: Standard High Calorie Oral Supplement  Code Status: Full Code    PT/OT Eval Status: not indicated at this time     Dispo -earliest date of discharge is April 19th, when home oxygen can be available through [address] CAROL Sykes MD

## 2021-04-19 VITALS
TEMPERATURE: 97.2 F | OXYGEN SATURATION: 95 % | HEIGHT: 77 IN | HEART RATE: 74 BPM | SYSTOLIC BLOOD PRESSURE: 117 MMHG | RESPIRATION RATE: 20 BRPM | WEIGHT: 206 LBS | BODY MASS INDEX: 24.32 KG/M2 | DIASTOLIC BLOOD PRESSURE: 65 MMHG

## 2021-04-19 LAB
ANION GAP SERPL CALCULATED.3IONS-SCNC: 5 MMOL/L (ref 3–16)
BUN BLDV-MCNC: 42 MG/DL (ref 7–20)
CALCIUM SERPL-MCNC: 8.7 MG/DL (ref 8.3–10.6)
CHLORIDE BLD-SCNC: 101 MMOL/L (ref 99–110)
CO2: 36 MMOL/L (ref 21–32)
CREAT SERPL-MCNC: 1.5 MG/DL (ref 0.8–1.3)
GFR AFRICAN AMERICAN: 56
GFR NON-AFRICAN AMERICAN: 46
GLUCOSE BLD-MCNC: 96 MG/DL (ref 70–99)
POTASSIUM SERPL-SCNC: 4.8 MMOL/L (ref 3.5–5.1)
SODIUM BLD-SCNC: 142 MMOL/L (ref 136–145)

## 2021-04-19 PROCEDURE — 2580000003 HC RX 258: Performed by: HOSPITALIST

## 2021-04-19 PROCEDURE — 94640 AIRWAY INHALATION TREATMENT: CPT

## 2021-04-19 PROCEDURE — 80048 BASIC METABOLIC PNL TOTAL CA: CPT

## 2021-04-19 PROCEDURE — 94761 N-INVAS EAR/PLS OXIMETRY MLT: CPT

## 2021-04-19 PROCEDURE — 6370000000 HC RX 637 (ALT 250 FOR IP): Performed by: INTERNAL MEDICINE

## 2021-04-19 PROCEDURE — 36415 COLL VENOUS BLD VENIPUNCTURE: CPT

## 2021-04-19 PROCEDURE — 94669 MECHANICAL CHEST WALL OSCILL: CPT

## 2021-04-19 PROCEDURE — 97530 THERAPEUTIC ACTIVITIES: CPT

## 2021-04-19 PROCEDURE — 6370000000 HC RX 637 (ALT 250 FOR IP): Performed by: HOSPITALIST

## 2021-04-19 PROCEDURE — 2700000000 HC OXYGEN THERAPY PER DAY

## 2021-04-19 RX ADMIN — Medication 10 ML: at 09:03

## 2021-04-19 RX ADMIN — IPRATROPIUM BROMIDE AND ALBUTEROL SULFATE 1 AMPULE: .5; 3 SOLUTION RESPIRATORY (INHALATION) at 08:41

## 2021-04-19 RX ADMIN — IPRATROPIUM BROMIDE AND ALBUTEROL SULFATE 1 AMPULE: .5; 3 SOLUTION RESPIRATORY (INHALATION) at 11:32

## 2021-04-19 RX ADMIN — PANTOPRAZOLE SODIUM 40 MG: 40 TABLET, DELAYED RELEASE ORAL at 09:03

## 2021-04-19 RX ADMIN — ASPIRIN 81 MG: 81 TABLET, COATED ORAL at 09:03

## 2021-04-19 ASSESSMENT — PAIN SCALES - GENERAL
PAINLEVEL_OUTOF10: 0
PAINLEVEL_OUTOF10: 0

## 2021-04-19 NOTE — CARE COORDINATION
RICH called and spoke with nurse Lloyd Collins with [facility] office. She confirms that she received orders for new O2 Friday but that he is \"number two on her list of things to do this AM\". She states that she still has to put a consult to respiratory this morning and thinks it may not be until tomorrow before their DME provider is able to deliver O2 tank or concentrator to pt home. CM requested phone call from office AS SOON as arrangements are made. PCP office is aware that pt has been medically ready for DC home since Friday AM 4/16. CM also called [facility] who is DME provider for [facility]; they are closed until 0900. CM will call back to ensure that they are aware of pt and expecting orders today.      Grace Forte RN

## 2021-04-19 NOTE — PROGRESS NOTES
[facility] NEPHROLOGY    [facility]              [phone]                      He is admitted with shortness of breath. He is found to have COPD exacerbation and also possible non-STEMI which he is being treated. His course is also complicated by hypotensive episodes. we are following for QUITA and related issues. Interval History and plan:     Slightly higher at 1.5  Was hypotensive this morning- blood pressure coming up  On 2 L of oxygen  Asymptomatic from low BP     Plan:  Slightly up today compared to yesterday due to hypotension  No significant hypotension at this time and no changes needed       Assessment :     Acute Kidney Injury  QUITA likely due to -dynamic changes-hypotension, but he also got contrast on the day of admission, also has been on diuretic which has been  held now  Cr on consultation 2.6  Baseline Cr-1.1 on 3/17  UA-bland from 4/21  Renal Imaging:-CT 2014-no mention of renal abnormality,  No indication to do at this time due to QUITA admitting due to shift and blood pressure and contrast   -History of prostate cancer status post prostatectomy, also has urinary incontinence  Echo: 4/21-normal ejection fraction, mild concentric LVH, grade 1 diastolic dysfunction      Hypertension   BP: (118-129)/(63-70)  Pulse:  [64-67]   BP goal inpatient 124-050 systolic inpatient  Blood pressure low on consult  Atrial fibrillation    COPD exacerbation  Elevated troponin  [facility]phrology would like to thank Tamia Muniz MD   for opportunity to serve this patient      Please call with questions at-   24 Hrs Answering service [phone] or  7 am- 5 pm via Perfect serve or cell phone  Dr.Sudhir Aman Shore          CC/reason for consult :     QUITA     HPI :     Maude Sun is a 72 y.o. male presented to   the hospital on 4/10/2021 with shortness of breath for several weeks. If any has shortness of breath for some time which got worse after COVID-19 vaccine.   He is known to have COPD but not on oxygen dependence. He also has a history of smoking up until several weeks before this hospitalization. He came to the emergency room where an EKG was done which showed possible ST segment depression. His troponin was slightly up BNP was up at 1000. He has had chest x-ray done which showed no acute disease. He had a CT PE done which was negative for thromboembolism. Seen by cardiology and plan for possible angiogram this hospitalization  In the meantime we are consulted for QUITA and related issues    ROS:     Seen with- RN     positives in bold   Constitutional:  fever, chills, weakness, weight change, fatigue  Skin:  rash, pruritus, hair loss, bruising, dry skin, petechiae  Head, Face, Neck   headaches, swelling,  cervical adenopathy  Respiratory: shortness of breath, cough, or wheezing  Cardiovascular: chest pain, palpitations, dizzy, edema  Gastrointestinal: nausea, vomiting, diarrhea, constipation,belly pain    Yellow skin, blood in stool  Musculoskeletal:  back pain, muscle weakness, gait problems,       joint pain or swelling. Genitourinary:  dysuria, poor urine flow, flank pain, blood in urine  Neurologic:  vertigo, TIA'S, syncope, seizures, focal weakness  Psychosocial:  insomnia, anxiety, or depression.   Additional positive findings: SOB and wheeze much better than on admission                   All other remaining systems are negative or unable to obtain        PMH/PSH/SH/Family History:     Past Medical History:   Diagnosis Date    Abdominal Pain     Anxiety State     BPH (benign prostatic hyperplasia) 10/3/2013    Cancer [address] [address] 2014    prostate    Chest pain     Colon polyp 3/27/2012    DVT, lower extremity ([facility] [address]     Left Leg    Hernia 1/24/2014    HYPERTENSION     PE (pulmonary embolism) 2/7/2014    Pt denies    Prostatitis, Hx of     SBO (small bowel obstruction) ([facility] [address] 1/24/2014       Past Surgical History:   Procedure Laterality Date    HERNIA REPAIR      INGUINAL HERNIA REPAIR Right 1/23/14    incarcerated right inguinal hernia repair    OTHER SURGICAL HISTORY  1/16/2014    ROBOTIC ASSISTED LAPAROSCOPIC PROSTATECTOMY    PROSTATE BIOPSY      TONSILLECTOMY          reports that he quit smoking about 4 years ago. He smoked 0.00 packs per day for 44.00 years. He has never used smokeless tobacco. He reports current alcohol use. He reports that he does not use drugs. family history includes Cancer in his brother, brother, father, mother, and sister.          Medication:     Current Facility-Administered Medications: rivaroxaban (XARELTO) tablet 15 mg, 15 mg, Oral, Dinner  ipratropium-albuterol (DUONEB) nebulizer solution 1 ampule, 1 ampule, Inhalation, Q4H WA  aspirin EC tablet 81 mg, 81 mg, Oral, Daily  perflutren lipid microspheres (DEFINITY) injection 1.65 mg, 1.5 mL, Intravenous, ONCE PRN  sodium chloride flush 0.9 % injection 10 mL, 10 mL, Intravenous, 2 times per day  sodium chloride flush 0.9 % injection 10 mL, 10 mL, Intravenous, PRN  0.9 % sodium chloride infusion, 25 mL, Intravenous, PRN  senna (SENOKOT) tablet 8.6 mg, 1 tablet, Oral, Daily PRN  acetaminophen (TYLENOL) tablet 650 mg, 650 mg, Oral, Q6H PRN **OR** acetaminophen (TYLENOL) suppository 650 mg, 650 mg, Rectal, Q6H PRN  potassium chloride (KLOR-CON M) extended release tablet 40 mEq, 40 mEq, Oral, PRN **OR** potassium bicarb-citric acid (EFFER-K) effervescent tablet 40 mEq, 40 mEq, Oral, PRN **OR** potassium chloride 10 mEq/100 mL IVPB (Peripheral Line), 10 mEq, Intravenous, PRN  magnesium sulfate 2000 mg in 50 mL IVPB premix, 2,000 mg, Intravenous, PRN  promethazine (PHENERGAN) tablet 12.5 mg, 12.5 mg, Oral, Q6H PRN **OR** [DISCONTINUED] ondansetron (ZOFRAN) injection 4 mg, 4 mg, Intravenous, Q6H PRN  pantoprazole (PROTONIX) tablet 40 mg, 40 mg, Oral, QAM AC  atorvastatin (LIPITOR) tablet 80 mg, 80 mg, Oral, Nightly  ipratropium-albuterol (DUONEB) nebulizer solution 1 ampule, 1 ampule, Inhalation, Q4H PRN       Vitals :     Vitals:    04/19/21 0842   BP:    Pulse:    Resp: 20   Temp:    SpO2: 95%       I & O :       Intake/Output Summary (Last 24 hours) at 4/19/2021 1037  Last data filed at 4/19/2021 3040  Gross per 24 hour   Intake 960 ml   Output 1350 ml   Net -390 ml        Physical Examination :     General appearance: Anxious- no, distressed- no, in good spirits- yes  HEENT: Lips- normal, teeth- ok , oral mucosa- moist  Neck : Mass- no, appears symmetrical, JVD- not visible  Respiratory: Respiratory effort-comfortable on oxygen, wheeze-yes, crackles -no  Cardiovascular: Ausculation- No M/R/G, Edema no  Abdomen: visible mass- no, distention- no, scar- no, tenderness- no                            hepatosplenomegaly-  no  Musculoskeletal:  clubbing no,cyanosis- no , digital ischemia- no                           muscle strength- grossly normal , tone - grossly normal  Skin: rashes- no , ulcers- no, induration- no, tightening - no  Psychiatric:  Judgement and insight- normal           AAO X 3  Additional finding: -      LABS:     No results for input(s): WBC, HGB, HCT, PLT in the last 72 hours.   Recent Labs     04/17/21  0832 04/19/21  0526    142   K 4.3 4.8    101   CO2 31 36*   BUN 40* 42*   CREATININE 1.3 1.5*   GLUCOSE 92 96   PHOS 2.6  --

## 2021-04-19 NOTE — PROGRESS NOTES
Occupational Therapy  Facility/Department: [facility] A2 CARD TELEMETRY  Daily Treatment Note  NAME: Navdeep De La Paz  : 1950  MRN: [id number]    Date of Service: 2021    Discharge Recommendations:  24 hour supervision or assist, Home with Home health OT       Assessment   Performance deficits / Impairments: Decreased cognition  Assessment: pt independent with functional transfers & ADL's, supervision with mobility & safety to keep O2 nasal cannula on at all times for ADL's/mobility; continues to benefit from skilled OT services  Patient Education: disease specific:  importance of keeping O2 nasal cannula in place at all times including shower to maintain O 2 sats <= 92 %  REQUIRES OT FOLLOW UP: Yes  Activity Tolerance  Activity Tolerance: Patient Tolerated treatment well  Activity Tolerance: sitting EOB after 1 L O 2  nasal cannula reapplied:  90% O 2 sats; sitting in chair after walking on 1 L O 2: 93 %  Safety Devices  Safety Devices in place: Yes  Type of devices: Call light within reach; Left in chair;Nurse notified         Patient Diagnosis(es): The encounter diagnosis was Shortness of breath. has a past medical history of Abdominal Pain, Anxiety State, BPH (benign prostatic hyperplasia), Cancer ([address] Chest pain, Colon polyp, DVT, lower extremity ([address] Hernia, HYPERTENSION, PE (pulmonary embolism), Prostatitis, Hx of, and SBO (small bowel obstruction) ([address] has a past surgical history that includes hernia repair; Prostate biopsy; Tonsillectomy; other surgical history (2014); and Inguinal hernia repair (Right, 14).     Restrictions  Restrictions/Precautions  Restrictions/Precautions: General Precautions, Fall Risk  Position Activity Restriction  Other position/activity restrictions: up with assist, tele, 1 LO2  Subjective   General  Chart Reviewed: Yes  Patient assessed for rehabilitation services?: Yes  Response to previous treatment: Patient with no complaints from previous session  Family / Caregiver Present: No  Referring Practitioner: Mike Teague  Diagnosis: decompensated heart failure  Subjective  Subjective: Pt agreeable to OT  General Comment  Comments: RN cleared pt for OT; pt sitting EOB, agreeable to OT  Vital Signs  Patient Currently in Pain: Denies   Orientation  Orientation  Overall Orientation Status: Impaired  Orientation Level: Disoriented to situation;Oriented to person;Oriented to time;Oriented to place  Objective    ADL  Feeding: Independent  LE Dressing: Independent  Toileting: Independent        Balance  Sitting Balance: Independent  Standing Balance: Supervision(due to portable O 2 tank)  Standing Balance  Time: 2 minutes  x 1  Activity: functional mobility in hallway  Functional Mobility  Functional - Mobility Device: No device(gait belt)  Toilet Transfers  Toilet - Technique: Ambulating(independently without AD)  Toilet Transfer: Independent  Bed mobility  Supine to Sit: Unable to assess(already sitting up on EOB)  Sit to Supine: Unable to assess(Left up in chair, pt agreeable)  Transfers  Sit to stand: Independent  Stand to sit: Independent                       Cognition  Overall Cognitive Status: Exceptions  Arousal/Alertness: Appropriate responses to stimuli  Following Commands:  Follows one step commands consistently  Memory: Decreased short term memory(takes off nasal cannula walking to bathroom & when came back to room removing face mask, removed nasal cannula)  Safety Judgement: Decreased awareness of need for safety              Plan   Plan  Times per week: 3-5x/wk  Current Treatment Recommendations: Endurance Training, Functional Mobility Training, Safety Education & Training, Self-Care / ADL    AM-PAC Score        AM-PAC Inpatient Daily Activity Raw Score: 23 (04/19/21 1146)  AM-PAC Inpatient ADL T-Scale Score : 51.12 (04/19/21 1146)  ADL Inpatient CMS 0-100% Score: 15.86 (04/19/21 1146)  ADL Inpatient CMS G-Code Modifier : CI (04/19/21 3076)    Goals  Short term goals  Time Frame for Short term goals: 1-2 weeks (4/18)  Short term goal 1: Pt will complete ADL t/fs with distant SPV by 4/17.-4/19 pt independent with functional/toilet transfers  Short term goal 2: Pt will complete toileting with SPV.-4/19 STG met pt independent with toileting  Short term goal 3: Pt will complete UB/LB dressing with SPV.-4/19 pt independent with dressing self  Short term goal 4: Pt will complete x3 sets of x15 reps BUE therex w/o SOB to increase endurance.-ongoing, 15 x each 4/14  Patient Goals   Patient goals : Return home       Therapy Time   Individual Concurrent Group Co-treatment   Time In [facility]         Time Out 1104         Minutes [address] [place] [address]

## 2021-04-19 NOTE — DISCHARGE INSTR - COC
Continuity of Care Form    Patient Name: Waleska Payjef   :  1950  MRN:  [id number]    Admit date:  4/10/2021  Discharge date:  ***    Code Status Order: Full Code   Advance Directives:   Advance Care Flowsheet Documentation       Date/Time Healthcare Directive Type of Healthcare Directive Copy in [address] Agent's Name Healthcare Agent's Phone Number    04/10/21 5073  Yes, patient has an advance directive for healthcare treatment  Durable power of  for health care;Living will  No, copy requested from family  Healthcare power of   [facility]  [phone]            Admitting Physician:  Erika Richards MD  PCP: Chasidy Carrasco MD    Discharging Nurse: [facility] Unit/Room#: 0212/0212-01  Discharging Unit Phone Number: ***    Emergency Contact:   Extended Emergency Contact Information  Primary Emergency Contact: Abi Wellsing of [address] Phone: [phone]  Work Phone: [phone]  Relation: Child  Secondary Emergency Contact: [facility]  Mobile Phone: [phone]  Relation: Child    Past Surgical History:  Past Surgical History:   Procedure Laterality Date    HERNIA REPAIR      INGUINAL HERNIA REPAIR Right 14    incarcerated right inguinal hernia repair    OTHER SURGICAL HISTORY  2014    ROBOTIC ASSISTED LAPAROSCOPIC PROSTATECTOMY    PROSTATE BIOPSY      TONSILLECTOMY         Immunization History:   Immunization History   Administered Date(s) Administered    Tdap (Boostrix, Adacel) 2013       Active Problems:  Patient Active Problem List   Diagnosis Code    Anxiety state F41.1    Essential hypertension I10    Colon polyp K63.5    Vitamin D deficiency E55.9    Prostate cancer ([facility] [address] C61    Lower leg DVT (deep venous thromboembolism), acute ([address] I82.4Z9    Pulmonary embolism ([facility]) I26.99    Post-phlebitic syndrome I87.009    Depression F32.9    NSVT (nonsustained ventricular tachycardia) ([facility]) I47.2    Hx of deep venous thrombosis Z86.718    COPD exacerbation ([facility]) J44.1    History of pulmonary embolus (PE) Z86.711    PAF (paroxysmal atrial fibrillation) ([facility]) I48.0       Isolation/Infection:   Isolation            No Isolation          Patient Infection Status       None to display            Nurse Assessment:  Last Vital Signs: /65   Pulse 74   Temp 97.2 °F (36.2 °C) (Oral)   Resp 20   Ht 6' 5\" (1.956 m)   Wt 206 lb (93.4 kg)   SpO2 95%   BMI 24.43 kg/m²     Last documented pain score (0-10 scale): Pain Level: 0  Last Weight:   Wt Readings from Last 1 Encounters:   04/19/21 206 lb (93.4 kg)     Mental Status:  {IP PT MENTAL STATUS:20030:::0}    IV Access:  { FIDELIA IV ACCESS:093024606:::0}    Nursing Mobility/ADLs:  Walking   {CHP DME ADLs:028525812:::0}  Transfer  {CHP DME ADLs:601036919:::0}  Bathing  {CHP DME ADLs:619036442:::0}  Dressing  {CHP DME ADLs:318177129:::0}  Toileting  {CHP DME ADLs:392068003:::0}  Feeding  {CHP DME ADLs:108631991:::0}  Med Admin  {CHP DME ADLs:270203177:::0}  Med Delivery   { FIDELIA MED Delivery:469734507:::0}    Wound Care Documentation and Therapy:        Elimination:  Continence: Bowel: {YES / DY:46764}  Bladder: {YES / IS:20254}  Urinary Catheter: {Urinary Catheter:559749818:::0}   Colostomy/Ileostomy/Ileal Conduit: {YES / TZ:87770}       Date of Last BM: ***    Intake/Output Summary (Last 24 hours) at 4/19/2021 1213  Last data filed at 4/19/2021 0921  Gross per 24 hour   Intake 720 ml   Output 1350 ml   Net -630 ml     I/O last 3 completed shifts:   In: 720 [P.O.:720]  Out: 1350 [Urine:1350]    Safety Concerns:     [address] FIDELIA Safety Concerns:838474823:::0}    Impairments/Disabilities:      [address] MISTRY Impairments/Disabilities:943579318:::0}    Nutrition Therapy:  Current Nutrition Therapy:   [address] FIDELIA Diet List:847454227:::0}    Routes of Feeding: {CHP DME Other Feedings:277118154:::0}  Liquids: {Slp liquid thickness:78459}  Daily Fluid Restriction: {CHP DME Yes amt example:026057684:::0}  Last Modified Barium Swallow with Video (Video Swallowing Test): {Done Not Done VQQX:780037100:::1}    Treatments at the Time of Hospital Discharge:   Respiratory Treatments: ***  Oxygen Therapy:  {Therapy; copd oxygen:47123:::0}  Ventilator:    {[facility] Vent List:912464991:::0}    Rehab Therapies: {THERAPEUTIC INTERVENTION:9592797835}  Weight Bearing Status/Restrictions: {[facility] Weight Bearin:::0}  Other Medical Equipment (for information only, NOT a DME order):  {EQUIPMENT:685323797}  Other Treatments: ***    Patient's personal belongings (please select all that are sent with patient):  {CHP DME Belongings:727288266:::0}    RN SIGNATURE:  {Esignature:259555449:::0}    CASE MANAGEMENT/SOCIAL WORK SECTION    Inpatient Status Date: ***    Readmission Risk Assessment Score:  Readmission Risk              Risk of Unplanned Readmission:        18           Discharging to Facility/ Agency   Name:   Address:  Phone:  Fax:    Dialysis Facility (if applicable)   Name:  Address:  Dialysis Schedule:  Phone:  Fax:    / signature: {Esignature:300334097:::0}    PHYSICIAN SECTION    Prognosis: Good    Condition at Discharge: Stable    Rehab Potential (if transferring to Rehab): Fair    Recommended Labs or Other Treatments After Discharge: PT, OT, skilled nursing    Physician Certification: I certify the above information and transfer of Elizabeth Hairston  is necessary for the continuing treatment of the diagnosis listed and that he requires [address] for less 30 days.      Update Admission H&P: No change in H&P    PHYSICIAN SIGNATURE:  Electronically signed by Giselle Currie MD on 21 at 12:13 PM EDT

## 2021-04-19 NOTE — PROGRESS NOTES
Pt d/c'd home. Removed PIV and stopped bleeding. Catheter intact. Pt tolerated well. No redness noted at site. Notified [facility] and removed tele box. Reviewed d/c instructions, home meds, and  f/u information utilizing teach-back method. Patient verbalized understanding.   Awaiting daughter to bring home O2 tank and to transport patient home

## 2021-04-19 NOTE — CARE COORDINATION
RICH called [employer] to check status of portable tank delivery to [facility] [phone]. Spoke with [place] who states that pt has scheduled delivery for concentrator and portable O2 tank now; pt daughter will meet provider at home to accept DME. RICH also spoke with pt daughter Ernesto Joseph who confirms she is at pt residence and will bring portable O2 with her to take pt home. RICH attempted to speak with pt to provide information, however room phone busy. RICH did notify Primary RN of situation. Anticipate pt will be ready for DC with home O2 within an hour. No further needs per pt.      Pastor Purvis, RN

## 2021-04-19 NOTE — DISCHARGE SUMMARY
midline. Respiratory:  Normal respiratory effort. Clear to auscultation, bilaterally without Rales/Wheezes/Rhonchi. Cardiovascular:  Regular rate and rhythm with normal S1/S2 without murmurs, rubs or gallops. Abdomen: Soft, non-tender, non-distended with normal bowel sounds. Musculoskeletal:  No clubbing, cyanosis or edema bilaterally. Full range of motion without deformity. Skin: Skin color, texture, turgor normal.  No rashes or lesions. Neurologic:  Neurovascularly intact without any focal sensory/motor deficits. Cranial nerves: II-XII intact, grossly non-focal.  Psychiatric:  Alert and oriented, thought content appropriate, normal insight  Capillary Refill: Brisk,< 3 seconds   Peripheral Pulses: +2 palpable, equal bilaterally       Labs: For convenience and continuity at follow-up the following most recent labs are provided:      CBC:    Lab Results   Component Value Date    WBC 15.8 04/12/2021    HGB 13.3 04/12/2021    HCT 40.4 04/12/2021     04/12/2021       Renal:    Lab Results   Component Value Date     04/19/2021    K 4.8 04/19/2021    K 4.4 04/10/2021     04/19/2021    CO2 36 04/19/2021    BUN 42 04/19/2021    CREATININE 1.5 04/19/2021    CALCIUM 8.7 04/19/2021    PHOS 2.6 04/17/2021         Significant Diagnostic Studies    Radiology:   NM Cardiac Stress Test Nuclear Imaging   Final Result      CT CARDIAC CALCIUM SCORING   Final Result   Total Agatston calcium score of 207 indicates moderate plaque burden as   described below. Patient in the 40th percentile for age. No incidental clinically important extracardiac CT findings. Calcium Score Interpretation      0  No identifiable atherosclerotic plaque. Very low cardiovascular disease   risk. Less than 5% chance of presence of coronary artery disease. A   negative examination. 1-10 Minimal plaque burden. Significant coronary artery disease very   unlikely.  Mild plaque burden.  Likely mild or minimal coronary stenosis. 101-400 Moderate plaque burden. Moderate non-obstructive coronary artery   disease highly likely. Over 400 Extensive plaque burden. High likelihood of at least one   significant coronary artery stenosis (>50% diameter). CALCIUM SCORING OVERVIEW:      Coronary calcium is a marker for plaque in a blood vessel or atherosclerosis   (hardening of the arteries). The presence and amount of calcium detected in   the coronary artery by the CT scan estimates the presence and amount of   atherosclerotic plaque. These calcium deposits can appear years before the   development of heart disease symptoms such as chest pain and shortness of   breath. A calcium score is computed for each of the coronary arteries based upon the   volume and density of the calcium deposits. This can be referred to as your   calcified plaque burden. It does not correspond directly to the percentage   of narrowing in the artery, but does correlate with the severity of the   overall coronary atherosclerotic burden. This score is then used to determine the calcium percentile which compares   your calcified plaque burden to that of other asymptomatic men and women of   the same age. The calcium score, in combination with the percentile, enables   your physician to determine your risk of developing symptomatic coronary   artery disease, and to measure the progression of disease as well as the   effectiveness of treatment. A score of zero indicates that there is no calcified plaque burden. This   implies that there is no significant coronary artery narrowing and very low   likelihood of a cardiac event over at least the next 3 years. It does not   absolutely rule out the presence of soft, noncalcified plaque or totally   eliminate the possibility of a cardiac event. A score greater than zero indicates at least some coronary artery disease.    As the score increases, so does the likelihood of a significant coronary   narrowing and the likelihood of a coronary event over the next 3 years. Similarly, the likelihood of a coronary event increases with increasing   calcium percentiles. CT CHEST PULMONARY EMBOLISM W CONTRAST   Final Result   1. No evidence of pulmonary embolic disease. 2. No acute pulmonary findings. Pulmonary emphysema. 3. Stable mass in the thoracic inlet on the right posterior to the thyroid   gland. 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         XR CHEST PORTABLE   Final Result   1. No acute abnormality. Consults:     IP CONSULT TO HOSPITALIST  IP CONSULT TO CARDIOLOGY  IP CONSULT TO HEART FAILURE NURSE/COORDINATOR  IP CONSULT TO DIETITIAN  IP CONSULT TO NEPHROLOGY    Disposition: Home with home health care and home oxygen    Condition at Discharge: Stable    Discharge Instructions/Follow-up: PCP, nephrology    Code Status:  Full Code     Activity: activity as tolerated    Diet: regular diet      Discharge Medications:     Current Discharge Medication List           Details   albuterol sulfate (PROAIR RESPICLICK) 619 (90 Base) MCG/ACT aerosol powder inhalation Inhale 2 puffs into the lungs every 4 hours as needed for Wheezing or Shortness of Breath      rivaroxaban (XARELTO) 20 MG TABS tablet Take 20 mg by mouth Daily with supper      metoprolol tartrate (LOPRESSOR) 25 MG tablet Take 12.5 mg by mouth 2 times daily Take 12.5 mg BID      atorvastatin (LIPITOR) 80 MG tablet Take 80 mg by mouth nightly      !! Compression Stockings MISC by Does not apply route. Qty: 1 each, Refills: 0    Associated Diagnoses: Edema; Lower leg DVT (deep venous thromboembolism), acute, left ([address]      ! ! Compression Stockings MISC by Does not apply route. Qty: 2 each, Refills: 0    Associated Diagnoses: Lower leg DVT (deep venous thromboembolism), acute ([address] PE (pulmonary embolism)       ! ! - Potential duplicate medications found. Please discuss with provider. Time Spent on discharge is more than 45 minutes in the examination, evaluation, counseling and review of medications and discharge plan. Signed:    Rome Gordon MD   4/19/2021      Thank you Theodore Wheeler MD for the opportunity to be involved in this patient's care. If you have any questions or concerns please feel free to contact me at [phone].

## 2022-02-10 ENCOUNTER — APPOINTMENT (OUTPATIENT)
Dept: GENERAL RADIOLOGY | Age: 72
End: 2022-02-10
Payer: OTHER GOVERNMENT

## 2022-02-10 ENCOUNTER — ANCILLARY PROCEDURE (OUTPATIENT)
Dept: EMERGENCY DEPT | Age: 72
End: 2022-02-10
Payer: OTHER GOVERNMENT

## 2022-02-10 ENCOUNTER — HOSPITAL ENCOUNTER (EMERGENCY)
Age: 72
Discharge: HOME | End: 2022-02-10
Attending: EMERGENCY MEDICINE
Payer: OTHER GOVERNMENT

## 2022-02-10 VITALS
HEIGHT: 77 IN | WEIGHT: 210 LBS | BODY MASS INDEX: 24.79 KG/M2 | TEMPERATURE: 98.2 F | HEART RATE: 82 BPM | OXYGEN SATURATION: 94 % | SYSTOLIC BLOOD PRESSURE: 119 MMHG | RESPIRATION RATE: 21 BRPM | DIASTOLIC BLOOD PRESSURE: 85 MMHG

## 2022-02-10 DIAGNOSIS — J44.1 COPD EXACERBATION (HCC): Primary | ICD-10-CM

## 2022-02-10 LAB
A/G RATIO: 1.6 (ref 1.1–2.2)
ALBUMIN SERPL-MCNC: 4.2 G/DL (ref 3.4–5)
ALP BLD-CCNC: 113 U/L (ref 40–129)
ALT SERPL-CCNC: 13 U/L (ref 10–40)
ANION GAP SERPL CALCULATED.3IONS-SCNC: 10 MMOL/L (ref 3–16)
AST SERPL-CCNC: 17 U/L (ref 15–37)
BASE EXCESS VENOUS: 3.9 MMOL/L (ref -3–3)
BASOPHILS ABSOLUTE: 0.1 K/UL (ref 0–0.2)
BASOPHILS RELATIVE PERCENT: 1.2 %
BILIRUB SERPL-MCNC: 0.7 MG/DL (ref 0–1)
BUN BLDV-MCNC: 26 MG/DL (ref 7–20)
CALCIUM SERPL-MCNC: 9.4 MG/DL (ref 8.3–10.6)
CARBOXYHEMOGLOBIN: 2 % (ref 0–1.5)
CHLORIDE BLD-SCNC: 100 MMOL/L (ref 99–110)
CO2: 30 MMOL/L (ref 21–32)
CREAT SERPL-MCNC: 1.5 MG/DL (ref 0.8–1.3)
EKG ATRIAL RATE: 93 BPM
EKG DIAGNOSIS: NORMAL
EKG P AXIS: 81 DEGREES
EKG P-R INTERVAL: 134 MS
EKG Q-T INTERVAL: 380 MS
EKG QRS DURATION: 82 MS
EKG QTC CALCULATION (BAZETT): 472 MS
EKG R AXIS: 81 DEGREES
EKG T AXIS: 68 DEGREES
EKG VENTRICULAR RATE: 93 BPM
EOSINOPHILS ABSOLUTE: 1 K/UL (ref 0–0.6)
EOSINOPHILS RELATIVE PERCENT: 10 %
GFR AFRICAN AMERICAN: 56
GFR NON-AFRICAN AMERICAN: 46
GLUCOSE BLD-MCNC: 125 MG/DL (ref 70–99)
HCO3 VENOUS: 32 MMOL/L (ref 23–29)
HCT VFR BLD CALC: 43.8 % (ref 40.5–52.5)
HEMOGLOBIN: 14.7 G/DL (ref 13.5–17.5)
LACTIC ACID: 1.7 MMOL/L (ref 0.4–2)
LYMPHOCYTES ABSOLUTE: 1.8 K/UL (ref 1–5.1)
LYMPHOCYTES RELATIVE PERCENT: 19.3 %
MCH RBC QN AUTO: 32.2 PG (ref 26–34)
MCHC RBC AUTO-ENTMCNC: 33.5 G/DL (ref 31–36)
MCV RBC AUTO: 96 FL (ref 80–100)
METHEMOGLOBIN VENOUS: 0.6 %
MONOCYTES ABSOLUTE: 0.6 K/UL (ref 0–1.3)
MONOCYTES RELATIVE PERCENT: 6.1 %
NEUTROPHILS ABSOLUTE: 6 K/UL (ref 1.7–7.7)
NEUTROPHILS RELATIVE PERCENT: 63.4 %
O2 SAT, VEN: 89 %
O2 THERAPY: ABNORMAL
PCO2, VEN: 62.4 MMHG (ref 40–50)
PDW BLD-RTO: 13.1 % (ref 12.4–15.4)
PH VENOUS: 7.33 (ref 7.35–7.45)
PLATELET # BLD: 268 K/UL (ref 135–450)
PMV BLD AUTO: 9.1 FL (ref 5–10.5)
PO2, VEN: 57 MMHG (ref 25–40)
POTASSIUM SERPL-SCNC: 4.5 MMOL/L (ref 3.5–5.1)
RBC # BLD: 4.56 M/UL (ref 4.2–5.9)
SODIUM BLD-SCNC: 140 MMOL/L (ref 136–145)
TCO2 CALC VENOUS: 34 MMOL/L
TOTAL PROTEIN: 6.8 G/DL (ref 6.4–8.2)
TROPONIN: 0.01 NG/ML
TROPONIN: 0.02 NG/ML
WBC # BLD: 9.5 K/UL (ref 4–11)

## 2022-02-10 PROCEDURE — 96374 THER/PROPH/DIAG INJ IV PUSH: CPT

## 2022-02-10 PROCEDURE — 93308 TTE F-UP OR LMTD: CPT

## 2022-02-10 PROCEDURE — 93010 ELECTROCARDIOGRAM REPORT: CPT | Performed by: INTERNAL MEDICINE

## 2022-02-10 PROCEDURE — 6360000002 HC RX W HCPCS: Performed by: EMERGENCY MEDICINE

## 2022-02-10 PROCEDURE — 83605 ASSAY OF LACTIC ACID: CPT

## 2022-02-10 PROCEDURE — 6370000000 HC RX 637 (ALT 250 FOR IP): Performed by: EMERGENCY MEDICINE

## 2022-02-10 PROCEDURE — 80053 COMPREHEN METABOLIC PANEL: CPT

## 2022-02-10 PROCEDURE — 71045 X-RAY EXAM CHEST 1 VIEW: CPT

## 2022-02-10 PROCEDURE — 85025 COMPLETE CBC W/AUTO DIFF WBC: CPT

## 2022-02-10 PROCEDURE — 84484 ASSAY OF TROPONIN QUANT: CPT

## 2022-02-10 PROCEDURE — 93005 ELECTROCARDIOGRAM TRACING: CPT | Performed by: EMERGENCY MEDICINE

## 2022-02-10 PROCEDURE — 99284 EMERGENCY DEPT VISIT MOD MDM: CPT

## 2022-02-10 PROCEDURE — 82803 BLOOD GASES ANY COMBINATION: CPT

## 2022-02-10 RX ORDER — AZITHROMYCIN 250 MG/1
250 TABLET, FILM COATED ORAL SEE ADMIN INSTRUCTIONS
Qty: 6 TABLET | Refills: 0 | Status: SHIPPED | OUTPATIENT
Start: 2022-02-10 | End: 2022-02-15

## 2022-02-10 RX ORDER — IPRATROPIUM BROMIDE AND ALBUTEROL SULFATE 2.5; .5 MG/3ML; MG/3ML
1 SOLUTION RESPIRATORY (INHALATION)
Status: DISPENSED | OUTPATIENT
Start: 2022-02-10 | End: 2022-02-10

## 2022-02-10 RX ORDER — PREDNISONE 50 MG/1
50 TABLET ORAL DAILY
Qty: 5 TABLET | Refills: 0 | Status: SHIPPED | OUTPATIENT
Start: 2022-02-10 | End: 2022-02-15

## 2022-02-10 RX ORDER — METHYLPREDNISOLONE SODIUM SUCCINATE 125 MG/2ML
125 INJECTION, POWDER, LYOPHILIZED, FOR SOLUTION INTRAMUSCULAR; INTRAVENOUS ONCE
Status: COMPLETED | OUTPATIENT
Start: 2022-02-10 | End: 2022-02-10

## 2022-02-10 RX ADMIN — IPRATROPIUM BROMIDE AND ALBUTEROL SULFATE 1 AMPULE: .5; 2.5 SOLUTION RESPIRATORY (INHALATION) at 13:48

## 2022-02-10 RX ADMIN — METHYLPREDNISOLONE SODIUM SUCCINATE 125 MG: 125 INJECTION, POWDER, FOR SOLUTION INTRAMUSCULAR; INTRAVENOUS at 13:06

## 2022-02-10 RX ADMIN — IPRATROPIUM BROMIDE AND ALBUTEROL SULFATE 1 AMPULE: .5; 2.5 SOLUTION RESPIRATORY (INHALATION) at 13:06

## 2022-02-10 NOTE — ED PROVIDER NOTES
CHIEF COMPLAINT  Shortness of Breath (Hx copd. Worsening trouble breathing in 2-3 weaks with exertion. )      HISTORY OF PRESENT ILLNESS  Obi Guzman is a 80 y.o. male with a history of COPD on 2 L nasal cannula, anxiety, atrial fibrillation on Xarelto presenting to the emergency department for evaluation of shortness of breath. Patient states that he thinks that is his COPD that is acting up. He states that he has had some increasing shortness of breath over the past several weeks. He has had productive cough of clear sputum. He denies any chest pain, leg swelling. He denies any pleuritic component of difficulty breathing. He has been compliant with his blood thinners.     Past Medical History:   Diagnosis Date    Abdominal Pain     Anxiety State     BPH (benign prostatic hyperplasia) 10/3/2013    Cancer ([facility] 2014    prostate    Chest pain     Colon polyp 3/27/2012    DVT, lower extremity (HCC)     Left Leg    Hernia 1/24/2014    HYPERTENSION     PE (pulmonary embolism) 2/7/2014    Pt denies    Prostatitis, Hx of     SBO (small bowel obstruction) ([facility] 1/24/2014     Past Surgical History:   Procedure Laterality Date    HERNIA REPAIR      INGUINAL HERNIA REPAIR Right 1/23/14    incarcerated right inguinal hernia repair    OTHER SURGICAL HISTORY  1/16/2014    ROBOTIC ASSISTED LAPAROSCOPIC PROSTATECTOMY    PROSTATE BIOPSY      TONSILLECTOMY       Family History   Problem Relation Age of Onset    Cancer Mother     Cancer Father     Cancer Sister     Cancer Brother     Cancer Brother         Brain     Social History     Socioeconomic History    Marital status:      Spouse name: Not on file    Number of children: Not on file    Years of education: Not on file    Highest education level: Not on file   Occupational History    Not on file   Tobacco Use    Smoking status: Former Smoker     Packs/day: 0.00     Years: 44.00     Pack years: 0.00     Quit date: 3/17/2017     Years since quittin.9    Smokeless tobacco: Never Used   Substance and Sexual Activity    Alcohol use: Yes     Comment: rarely    Drug use: No    Sexual activity: Not on file   Other Topics Concern    Not on file   Social History Narrative    Not on file     Social Determinants of Health     Financial Resource Strain:     Difficulty of Paying Living Expenses: Not on file   Food Insecurity:     Worried About Running Out of Food in the Last Year: Not on file    Ian of Food in the Last Year: Not on file   Transportation Needs:     Lack of Transportation (Medical): Not on file    Lack of Transportation (Non-Medical): Not on file   Physical Activity:     Days of Exercise per Week: Not on file    Minutes of Exercise per Session: Not on file   Stress:     Feeling of Stress : Not on file   Social Connections:     Frequency of Communication with Friends and Family: Not on file    Frequency of Social Gatherings with Friends and Family: Not on file    Attends Muslim Services: Not on file    Active Member of [address] [place] [address] or Organizations: Not on file    Attends Club or Organization Meetings: Not on file    Marital Status: Not on file   Intimate Partner Violence:     Fear of Current or Ex-Partner: Not on file    Emotionally Abused: Not on file    Physically Abused: Not on file    Sexually Abused: Not on file   Housing Stability:     Unable to Pay for Housing in the Last Year: Not on file    Number of [place] in the Last Year: Not on file    Unstable Housing in the Last Year: Not on file     No current facility-administered medications for this encounter.      Current Outpatient Medications   Medication Sig Dispense Refill    predniSONE (DELTASONE) 50 MG tablet Take 1 tablet by mouth daily for 5 days 5 tablet 0    azithromycin (ZITHROMAX) 250 MG tablet Take 1 tablet by mouth See Admin Instructions for 5 days 500mg on day 1 followed by 250mg on days 2 - 5 6 tablet 0    albuterol sulfate (PROAIR RESPICLICK) 500 (90 Base) MCG/ACT aerosol powder inhalation Inhale 2 puffs into the lungs every 4 hours as needed for Wheezing or Shortness of Breath      rivaroxaban (XARELTO) 20 MG TABS tablet Take 20 mg by mouth Daily with supper      metoprolol tartrate (LOPRESSOR) 25 MG tablet Take 12.5 mg by mouth 2 times daily Take 12.5 mg BID      atorvastatin (LIPITOR) 80 MG tablet Take 80 mg by mouth nightly      Compression Stockings MISC by Does not apply route. 1 each 0    Compression Stockings MISC by Does not apply route. 2 each 0     No Known Allergies    REVIEW OF SYSTEMS  Positive and pertinent negatives as per HPI. All other systems were reviewed and are negative. PHYSICAL EXAM  /85   Pulse 82   Temp 98.2 °F (36.8 °C)   Resp 21   Ht 6' 5\" (1.956 m)   Wt 210 lb (95.3 kg)   SpO2 94%   BMI 24.90 kg/m²   GENERAL APPEARANCE: Awake and alert. Cooperative. HEAD: Normocephalic. Atraumatic. T  HEART: RRR. No harsh murmurs. Intact radial pulses 2+ bilaterally. LUNGS: Respirations unlabored without accessory muscle use. Coarse wheezes bilaterally speaking comfortably in full sentences. ABDOMEN: Soft. Non-distended. Non-tender. No guarding or rebound. EXTREMITIES: No peripheral edema. No acute deformities. SKIN: Warm and dry. No acute rashes. NEUROLOGICAL: Alert and oriented X 3. No focal deficits    LABS  I have reviewed all labs for this visit.    Results for orders placed or performed during the hospital encounter of 02/10/22   CBC auto differential   Result Value Ref Range    WBC 9.5 4.0 - 11.0 K/uL    RBC 4.56 4.20 - 5.90 M/uL    Hemoglobin 14.7 13.5 - 17.5 g/dL    Hematocrit 43.8 40.5 - 52.5 %    MCV 96.0 80.0 - 100.0 fL    MCH 32.2 26.0 - 34.0 pg    MCHC 33.5 31.0 - 36.0 g/dL    RDW 13.1 12.4 - 15.4 %    Platelets 941 808 - 954 K/uL    MPV 9.1 5.0 - 10.5 fL    Neutrophils % 63.4 %    Lymphocytes % 19.3 %    Monocytes % 6.1 %    Eosinophils % 10.0 %    Basophils % 1.2 %    Neutrophils Absolute 6.0 1.7 - 7.7 K/uL    Lymphocytes Absolute 1.8 1.0 - 5.1 K/uL    Monocytes Absolute 0.6 0.0 - 1.3 K/uL    Eosinophils Absolute 1.0 (H) 0.0 - 0.6 K/uL    Basophils Absolute 0.1 0.0 - 0.2 K/uL   Comprehensive metabolic panel   Result Value Ref Range    Sodium 140 136 - 145 mmol/L    Potassium 4.5 3.5 - 5.1 mmol/L    Chloride 100 99 - 110 mmol/L    CO2 30 21 - 32 mmol/L    Anion Gap 10 3 - 16    Glucose 125 (H) 70 - 99 mg/dL    BUN 26 (H) 7 - 20 mg/dL    CREATININE 1.5 (H) 0.8 - 1.3 mg/dL    GFR Non- 46 (A) >60    GFR  56 (A) >60    Calcium 9.4 8.3 - 10.6 mg/dL    Total Protein 6.8 6.4 - 8.2 g/dL    Albumin 4.2 3.4 - 5.0 g/dL    Albumin/Globulin Ratio 1.6 1.1 - 2.2    Total Bilirubin 0.7 0.0 - 1.0 mg/dL    Alkaline Phosphatase 113 40 - 129 U/L    ALT 13 10 - 40 U/L    AST 17 15 - 37 U/L   Blood gas, venous   Result Value Ref Range    pH, Sumeet 7.328 (L) 7.350 - 7.450    pCO2, Sumeet 62.4 (H) 40.0 - 50.0 mmHg    pO2, Sumeet 57.0 (H) 25.0 - 40.0 mmHg    HCO3, Venous 32.0 (H) 23.0 - 29.0 mmol/L    Base Excess, Sumeet 3.9 (H) -3.0 - 3.0 mmol/L    O2 Sat, Sumeet 89 Not Established %    Carboxyhemoglobin 2.0 (H) 0.0 - 1.5 %    MetHgb, Sumeet 0.6 <1.5 %    TC02 (Calc), Sumeet 34 Not Established mmol/L    O2 Therapy Unknown    Lactic acid, plasma   Result Value Ref Range    Lactic Acid 1.7 0.4 - 2.0 mmol/L   Troponin   Result Value Ref Range    Troponin 0.02 (H) <0.01 ng/mL   Troponin   Result Value Ref Range    Troponin 0.01 <0.01 ng/mL   EKG 12 Lead   Result Value Ref Range    Ventricular Rate 93 BPM    Atrial Rate 93 BPM    P-R Interval 134 ms    QRS Duration 82 ms    Q-T Interval 380 ms    QTc Calculation (Bazett) 472 ms    P Axis 81 degrees    R Axis 81 degrees    T Axis 68 degrees    Diagnosis       Normal sinus rhythmST abnormality infeerolateral leads c/w possible ischemia vs digoxin effectProlonged QTAbnormal ECGWhen compared with ECG of 12-APR-2021 05:06,No significant change was foundConfirmed by Miguel Ángel Comer MD, [address] [place] [address] ([id number]) on 2/10/2022 5:46:40 PM       EKG  The Ekg interpreted by myself in the emergency department in the absence of a cardiologist.  normal sinus rhythm with a rate of 93  Axis is   Normal  QTc is  472  Intervals and Durations are unremarkable. No specific ST-T wave changes appreciated. There is nonspecific ST changes leads II, 3, aVF, V4 through V6 which is unchanged when compared to prior EKG dated April 10, 2021  No evidence of acute ischemia. RADIOLOGY  X-RAYS:  I have reviewed radiologic plain film image(s). ALL OTHER NON-PLAIN FILM IMAGES SUCH AS CT, ULTRASOUND AND MRI HAVE BEEN READ BY THE RADIOLOGIST. POC US [facility] [employer]   Final Result      XR CHEST PORTABLE   Final Result   No significant change from priors. COPD changes. Critical Care: Total critical care time is 15 minutes, which excludes separately billable procedures and updating family. Time spent is specifically for management of the presenting complaint and symptoms initially, direct bedside care, reevaluation, review of records, and consultation. There was a high probability of clinically significant life-threatening deterioration in the patient's condition, which required my urgent intervention. ED COURSE/MDM  Patient seen and evaluated. Old records reviewed. Labs and imaging reviewed and results discussed with patient. 52-year-old male presenting for evaluation shortness of breath, concerning for COPD exacerbation. SPO2 in the 90s on home O2. Denies any chest pain at this time. EKG is nonischemic. Bedside echo shows no pericardial effusion, obvious findings concerning for CHF. She will be initiated on breathing treatments, steroids for presumed COPD exacerbation. Will obtain basic labs, cardiac panel. Laboratory evaluation does reveal mildly elevated troponin to 0.02.   It has been elevated this high in the past after which he underwent a stress test.      The stress test did not show active ischemia. Normal LVEF >60%    Diaphragmatic attenuation artifact    Septal hypokinesis    Fixed septal/apical defect suggestive of scar    No ischemia     Spoke with Dr. Mia Cherry of cardiology who recommended that in the setting of no chest pain, no significant delta troponin, reassuring stress test about 10 months ago, patient does not require admission for further cardiology evaluation. Discussed this with the patient and he expresses understanding. He will be initiated on prednisone, azithromycin for management of COPD exacerbation. He does state that he has PCP follow-up soon. Advised to return for worsening difficulty breathing, chest pain or any other concerns. He expressed understanding. The patient will be discharged from the emergency department. The patient was counseled on their diagnosis and any medications prescribed. They were advised on the need for PCP followup. They were counseled on the need to return to the emergency department if any of their symptoms were to worsen, change or have any other concerns. Discharged in stable condition. Patient was given scripts for the following medications. I counseled patient how to take these medications. Discharge Medication List as of 2/10/2022  5:08 PM      START taking these medications    Details   predniSONE (DELTASONE) 50 MG tablet Take 1 tablet by mouth daily for 5 days, Disp-5 tablet, R-0Print      azithromycin (ZITHROMAX) 250 MG tablet Take 1 tablet by mouth See Admin Instructions for 5 days 500mg on day 1 followed by 250mg on days 2 - 5, Disp-6 tablet, R-0Print             CLINICAL IMPRESSION  1. COPD exacerbation (HCC)        Blood pressure 119/85, pulse 82, temperature 98.2 °F (36.8 °C), resp. rate 21, height 6' 5\" (1.956 m), weight 210 lb (95.3 kg), SpO2 94 %.     DISPOSITION  Hiral Cooper was discharged to home in stable condition. This chart was generated in part by using Dragon Dictation system and may contain errors related to that system including errors in grammar, punctuation, and spelling, as well as words and phrases that may be inappropriate. If there are any questions or concerns please feel free to contact the dictating provider for clarification.      Sanya Alba MD  02/10/22 [id number]

## 2022-02-10 NOTE — ED NOTES
Bed: 09  Expected date:   Expected time:   Means of arrival:   Comments:  [facility] [address], [employer]  02/10/22 1226

## 2022-04-28 ENCOUNTER — APPOINTMENT (OUTPATIENT)
Dept: GENERAL RADIOLOGY | Age: 72
DRG: 190 | End: 2022-04-28
Payer: OTHER GOVERNMENT

## 2022-04-28 ENCOUNTER — HOSPITAL ENCOUNTER (INPATIENT)
Age: 72
LOS: 5 days | Discharge: HOME | DRG: 190 | End: 2022-05-03
Attending: EMERGENCY MEDICINE | Admitting: INTERNAL MEDICINE
Payer: OTHER GOVERNMENT

## 2022-04-28 DIAGNOSIS — J44.1 COPD EXACERBATION (HCC): Primary | ICD-10-CM

## 2022-04-28 DIAGNOSIS — J96.01 ACUTE RESPIRATORY FAILURE WITH HYPOXIA (HCC): ICD-10-CM

## 2022-04-28 PROBLEM — J96.21 ACUTE ON CHRONIC RESPIRATORY FAILURE WITH HYPOXIA AND HYPERCAPNIA (HCC): Status: ACTIVE | Noted: 2022-04-28

## 2022-04-28 PROBLEM — J43.8 OTHER EMPHYSEMA (HCC): Status: ACTIVE | Noted: 2022-04-28

## 2022-04-28 PROBLEM — N18.31 STAGE 3A CHRONIC KIDNEY DISEASE (HCC): Status: ACTIVE | Noted: 2022-04-28

## 2022-04-28 PROBLEM — I51.7 ENLARGED RV (RIGHT VENTRICLE): Status: ACTIVE | Noted: 2022-04-28

## 2022-04-28 PROBLEM — I51.89 GRADE I DIASTOLIC DYSFUNCTION: Status: ACTIVE | Noted: 2022-04-28

## 2022-04-28 PROBLEM — F17.200 CURRENT SMOKER: Status: ACTIVE | Noted: 2022-04-28

## 2022-04-28 PROBLEM — E83.52 HYPERCALCEMIA: Status: ACTIVE | Noted: 2022-04-28

## 2022-04-28 PROBLEM — R79.89 ELEVATED LACTIC ACID LEVEL: Status: ACTIVE | Noted: 2022-04-28

## 2022-04-28 PROBLEM — J96.22 ACUTE ON CHRONIC RESPIRATORY FAILURE WITH HYPOXIA AND HYPERCAPNIA (HCC): Status: ACTIVE | Noted: 2022-04-28

## 2022-04-28 LAB
A/G RATIO: 1.4 (ref 1.1–2.2)
ALBUMIN SERPL-MCNC: 4.9 G/DL (ref 3.4–5)
ALP BLD-CCNC: 108 U/L (ref 40–129)
ALT SERPL-CCNC: 18 U/L (ref 10–40)
ANION GAP SERPL CALCULATED.3IONS-SCNC: 13 MMOL/L (ref 3–16)
AST SERPL-CCNC: 19 U/L (ref 15–37)
BASE EXCESS VENOUS: -1.8 MMOL/L (ref -3–3)
BASOPHILS ABSOLUTE: 0.1 K/UL (ref 0–0.2)
BASOPHILS RELATIVE PERCENT: 1 %
BILIRUB SERPL-MCNC: 0.6 MG/DL (ref 0–1)
BUN BLDV-MCNC: 18 MG/DL (ref 7–20)
CALCIUM SERPL-MCNC: 10.9 MG/DL (ref 8.3–10.6)
CARBOXYHEMOGLOBIN: 2.1 % (ref 0–1.5)
CHLORIDE BLD-SCNC: 98 MMOL/L (ref 99–110)
CO2: 28 MMOL/L (ref 21–32)
CREAT SERPL-MCNC: 1.4 MG/DL (ref 0.8–1.3)
EKG ATRIAL RATE: 122 BPM
EKG DIAGNOSIS: NORMAL
EKG P AXIS: 89 DEGREES
EKG P-R INTERVAL: 132 MS
EKG Q-T INTERVAL: 306 MS
EKG QRS DURATION: 82 MS
EKG QTC CALCULATION (BAZETT): 436 MS
EKG R AXIS: 82 DEGREES
EKG T AXIS: -79 DEGREES
EKG VENTRICULAR RATE: 122 BPM
EOSINOPHILS ABSOLUTE: 1.1 K/UL (ref 0–0.6)
EOSINOPHILS RELATIVE PERCENT: 11.3 %
GFR AFRICAN AMERICAN: >60
GFR NON-AFRICAN AMERICAN: 50
GLUCOSE BLD-MCNC: 124 MG/DL (ref 70–99)
HCO3 VENOUS: 27.1 MMOL/L (ref 23–29)
HCT VFR BLD CALC: 47.3 % (ref 40.5–52.5)
HEMOGLOBIN: 15.5 G/DL (ref 13.5–17.5)
LACTIC ACID, SEPSIS: 2.1 MMOL/L (ref 0.4–1.9)
LACTIC ACID, SEPSIS: 2.3 MMOL/L (ref 0.4–1.9)
LACTIC ACID, SEPSIS: 2.3 MMOL/L (ref 0.4–1.9)
LYMPHOCYTES ABSOLUTE: 1.6 K/UL (ref 1–5.1)
LYMPHOCYTES RELATIVE PERCENT: 17.5 %
MCH RBC QN AUTO: 32.3 PG (ref 26–34)
MCHC RBC AUTO-ENTMCNC: 32.7 G/DL (ref 31–36)
MCV RBC AUTO: 98.8 FL (ref 80–100)
METHEMOGLOBIN VENOUS: 0.5 %
MONOCYTES ABSOLUTE: 0.5 K/UL (ref 0–1.3)
MONOCYTES RELATIVE PERCENT: 5.3 %
NEUTROPHILS ABSOLUTE: 6.1 K/UL (ref 1.7–7.7)
NEUTROPHILS RELATIVE PERCENT: 64.9 %
O2 SAT, VEN: 74 %
O2 THERAPY: ABNORMAL
PCO2, VEN: 62.7 MMHG (ref 40–50)
PDW BLD-RTO: 13.5 % (ref 12.4–15.4)
PH VENOUS: 7.25 (ref 7.35–7.45)
PLATELET # BLD: 297 K/UL (ref 135–450)
PMV BLD AUTO: 8.1 FL (ref 5–10.5)
PO2, VEN: 41.8 MMHG (ref 25–40)
POTASSIUM SERPL-SCNC: 4.4 MMOL/L (ref 3.5–5.1)
PROCALCITONIN: 0.07 NG/ML (ref 0–0.15)
RBC # BLD: 4.79 M/UL (ref 4.2–5.9)
SARS-COV-2, NAAT: NOT DETECTED
SODIUM BLD-SCNC: 139 MMOL/L (ref 136–145)
TCO2 CALC VENOUS: 29 MMOL/L
TOTAL PROTEIN: 8.3 G/DL (ref 6.4–8.2)
WBC # BLD: 9.3 K/UL (ref 4–11)

## 2022-04-28 PROCEDURE — 94761 N-INVAS EAR/PLS OXIMETRY MLT: CPT

## 2022-04-28 PROCEDURE — 6370000000 HC RX 637 (ALT 250 FOR IP): Performed by: EMERGENCY MEDICINE

## 2022-04-28 PROCEDURE — 6360000002 HC RX W HCPCS: Performed by: FAMILY MEDICINE

## 2022-04-28 PROCEDURE — 84145 PROCALCITONIN (PCT): CPT

## 2022-04-28 PROCEDURE — 99223 1ST HOSP IP/OBS HIGH 75: CPT | Performed by: INTERNAL MEDICINE

## 2022-04-28 PROCEDURE — 96374 THER/PROPH/DIAG INJ IV PUSH: CPT

## 2022-04-28 PROCEDURE — 94640 AIRWAY INHALATION TREATMENT: CPT

## 2022-04-28 PROCEDURE — 6370000000 HC RX 637 (ALT 250 FOR IP): Performed by: FAMILY MEDICINE

## 2022-04-28 PROCEDURE — 2580000003 HC RX 258: Performed by: EMERGENCY MEDICINE

## 2022-04-28 PROCEDURE — 83605 ASSAY OF LACTIC ACID: CPT

## 2022-04-28 PROCEDURE — 93005 ELECTROCARDIOGRAM TRACING: CPT | Performed by: EMERGENCY MEDICINE

## 2022-04-28 PROCEDURE — 87040 BLOOD CULTURE FOR BACTERIA: CPT

## 2022-04-28 PROCEDURE — 6360000002 HC RX W HCPCS: Performed by: EMERGENCY MEDICINE

## 2022-04-28 PROCEDURE — 1200000000 HC SEMI PRIVATE

## 2022-04-28 PROCEDURE — 2580000003 HC RX 258: Performed by: FAMILY MEDICINE

## 2022-04-28 PROCEDURE — 6370000000 HC RX 637 (ALT 250 FOR IP): Performed by: INTERNAL MEDICINE

## 2022-04-28 PROCEDURE — 2700000000 HC OXYGEN THERAPY PER DAY

## 2022-04-28 PROCEDURE — 80053 COMPREHEN METABOLIC PANEL: CPT

## 2022-04-28 PROCEDURE — 36415 COLL VENOUS BLD VENIPUNCTURE: CPT

## 2022-04-28 PROCEDURE — 85025 COMPLETE CBC W/AUTO DIFF WBC: CPT

## 2022-04-28 PROCEDURE — 87635 SARS-COV-2 COVID-19 AMP PRB: CPT

## 2022-04-28 PROCEDURE — 99285 EMERGENCY DEPT VISIT HI MDM: CPT

## 2022-04-28 PROCEDURE — 71045 X-RAY EXAM CHEST 1 VIEW: CPT

## 2022-04-28 PROCEDURE — 94644 CONT INHLJ TX 1ST HOUR: CPT

## 2022-04-28 PROCEDURE — 94669 MECHANICAL CHEST WALL OSCILL: CPT

## 2022-04-28 PROCEDURE — 94660 CPAP INITIATION&MGMT: CPT

## 2022-04-28 PROCEDURE — 93010 ELECTROCARDIOGRAM REPORT: CPT | Performed by: INTERNAL MEDICINE

## 2022-04-28 PROCEDURE — 82803 BLOOD GASES ANY COMBINATION: CPT

## 2022-04-28 RX ORDER — PREDNISONE 20 MG/1
40 TABLET ORAL DAILY
Status: DISCONTINUED | OUTPATIENT
Start: 2022-05-01 | End: 2022-05-03 | Stop reason: HOSPADM

## 2022-04-28 RX ORDER — IPRATROPIUM BROMIDE AND ALBUTEROL SULFATE 2.5; .5 MG/3ML; MG/3ML
3 SOLUTION RESPIRATORY (INHALATION) ONCE
Status: COMPLETED | OUTPATIENT
Start: 2022-04-28 | End: 2022-04-28

## 2022-04-28 RX ORDER — POLYETHYLENE GLYCOL 3350 17 G/17G
17 POWDER, FOR SOLUTION ORAL DAILY PRN
Status: DISCONTINUED | OUTPATIENT
Start: 2022-04-28 | End: 2022-05-03 | Stop reason: HOSPADM

## 2022-04-28 RX ORDER — ATORVASTATIN CALCIUM 80 MG/1
80 TABLET, FILM COATED ORAL NIGHTLY
Status: DISCONTINUED | OUTPATIENT
Start: 2022-04-28 | End: 2022-05-03 | Stop reason: HOSPADM

## 2022-04-28 RX ORDER — SODIUM CHLORIDE 0.9 % (FLUSH) 0.9 %
5-40 SYRINGE (ML) INJECTION PRN
Status: DISCONTINUED | OUTPATIENT
Start: 2022-04-28 | End: 2022-05-03 | Stop reason: HOSPADM

## 2022-04-28 RX ORDER — DOXYCYCLINE HYCLATE 100 MG
100 TABLET ORAL EVERY 12 HOURS SCHEDULED
Status: DISCONTINUED | OUTPATIENT
Start: 2022-04-28 | End: 2022-05-03 | Stop reason: HOSPADM

## 2022-04-28 RX ORDER — SODIUM CHLORIDE 9 MG/ML
INJECTION, SOLUTION INTRAVENOUS PRN
Status: DISCONTINUED | OUTPATIENT
Start: 2022-04-28 | End: 2022-05-03 | Stop reason: HOSPADM

## 2022-04-28 RX ORDER — SODIUM CHLORIDE 0.9 % (FLUSH) 0.9 %
5-40 SYRINGE (ML) INJECTION EVERY 12 HOURS SCHEDULED
Status: DISCONTINUED | OUTPATIENT
Start: 2022-04-28 | End: 2022-05-03 | Stop reason: HOSPADM

## 2022-04-28 RX ORDER — FLUTICASONE PROPIONATE 50 MCG
2 SPRAY, SUSPENSION (ML) NASAL DAILY
COMMUNITY

## 2022-04-28 RX ORDER — 0.9 % SODIUM CHLORIDE 0.9 %
1000 INTRAVENOUS SOLUTION INTRAVENOUS ONCE
Status: COMPLETED | OUTPATIENT
Start: 2022-04-28 | End: 2022-04-28

## 2022-04-28 RX ORDER — ONDANSETRON 2 MG/ML
4 INJECTION INTRAMUSCULAR; INTRAVENOUS EVERY 6 HOURS PRN
Status: DISCONTINUED | OUTPATIENT
Start: 2022-04-28 | End: 2022-05-03 | Stop reason: HOSPADM

## 2022-04-28 RX ORDER — METHYLPREDNISOLONE SODIUM SUCCINATE 40 MG/ML
40 INJECTION, POWDER, LYOPHILIZED, FOR SOLUTION INTRAMUSCULAR; INTRAVENOUS EVERY 6 HOURS
Status: COMPLETED | OUTPATIENT
Start: 2022-04-28 | End: 2022-04-30

## 2022-04-28 RX ORDER — ACETAMINOPHEN 325 MG/1
650 TABLET ORAL EVERY 6 HOURS PRN
Status: DISCONTINUED | OUTPATIENT
Start: 2022-04-28 | End: 2022-05-03 | Stop reason: HOSPADM

## 2022-04-28 RX ORDER — IPRATROPIUM BROMIDE AND ALBUTEROL SULFATE 2.5; .5 MG/3ML; MG/3ML
1 SOLUTION RESPIRATORY (INHALATION)
Status: DISCONTINUED | OUTPATIENT
Start: 2022-04-28 | End: 2022-04-28

## 2022-04-28 RX ORDER — IPRATROPIUM BROMIDE AND ALBUTEROL SULFATE 2.5; .5 MG/3ML; MG/3ML
1 SOLUTION RESPIRATORY (INHALATION) EVERY 4 HOURS
Status: DISCONTINUED | OUTPATIENT
Start: 2022-04-29 | End: 2022-05-02

## 2022-04-28 RX ORDER — ONDANSETRON 4 MG/1
4 TABLET, ORALLY DISINTEGRATING ORAL EVERY 8 HOURS PRN
Status: DISCONTINUED | OUTPATIENT
Start: 2022-04-28 | End: 2022-05-03 | Stop reason: HOSPADM

## 2022-04-28 RX ORDER — ACETAMINOPHEN 650 MG/1
650 SUPPOSITORY RECTAL EVERY 6 HOURS PRN
Status: DISCONTINUED | OUTPATIENT
Start: 2022-04-28 | End: 2022-05-03 | Stop reason: HOSPADM

## 2022-04-28 RX ORDER — METHYLPREDNISOLONE SODIUM SUCCINATE 125 MG/2ML
125 INJECTION, POWDER, LYOPHILIZED, FOR SOLUTION INTRAMUSCULAR; INTRAVENOUS ONCE
Status: COMPLETED | OUTPATIENT
Start: 2022-04-28 | End: 2022-04-28

## 2022-04-28 RX ORDER — PANTOPRAZOLE SODIUM 40 MG/1
40 TABLET, DELAYED RELEASE ORAL
Status: DISCONTINUED | OUTPATIENT
Start: 2022-04-29 | End: 2022-05-03 | Stop reason: HOSPADM

## 2022-04-28 RX ORDER — ASPIRIN 81 MG/1
81 TABLET ORAL DAILY
COMMUNITY

## 2022-04-28 RX ADMIN — METHYLPREDNISOLONE SODIUM SUCCINATE 125 MG: 125 INJECTION, POWDER, FOR SOLUTION INTRAMUSCULAR; INTRAVENOUS at 12:18

## 2022-04-28 RX ADMIN — DOXYCYCLINE HYCLATE 100 MG: 100 TABLET, COATED ORAL at 20:01

## 2022-04-28 RX ADMIN — SODIUM CHLORIDE 1000 ML: 9 INJECTION, SOLUTION INTRAVENOUS at 13:54

## 2022-04-28 RX ADMIN — RIVAROXABAN 20 MG: 20 TABLET, FILM COATED ORAL at 18:24

## 2022-04-28 RX ADMIN — METHYLPREDNISOLONE SODIUM SUCCINATE 40 MG: 40 INJECTION, POWDER, FOR SOLUTION INTRAMUSCULAR; INTRAVENOUS at 18:32

## 2022-04-28 RX ADMIN — IPRATROPIUM BROMIDE AND ALBUTEROL SULFATE 1 AMPULE: .5; 3 SOLUTION RESPIRATORY (INHALATION) at 23:43

## 2022-04-28 RX ADMIN — IPRATROPIUM BROMIDE AND ALBUTEROL SULFATE 3 AMPULE: .5; 2.5 SOLUTION RESPIRATORY (INHALATION) at 12:20

## 2022-04-28 RX ADMIN — METOPROLOL TARTRATE 12.5 MG: 25 TABLET, FILM COATED ORAL at 20:01

## 2022-04-28 RX ADMIN — IPRATROPIUM BROMIDE AND ALBUTEROL SULFATE 1 AMPULE: .5; 3 SOLUTION RESPIRATORY (INHALATION) at 20:10

## 2022-04-28 RX ADMIN — IPRATROPIUM BROMIDE AND ALBUTEROL SULFATE 1 AMPULE: .5; 3 SOLUTION RESPIRATORY (INHALATION) at 18:36

## 2022-04-28 RX ADMIN — ATORVASTATIN CALCIUM 80 MG: 80 TABLET, FILM COATED ORAL at 20:01

## 2022-04-28 RX ADMIN — SODIUM CHLORIDE, PRESERVATIVE FREE 10 ML: 5 INJECTION INTRAVENOUS at 20:01

## 2022-04-28 ASSESSMENT — PAIN SCALES - GENERAL: PAINLEVEL_OUTOF10: 3

## 2022-04-28 ASSESSMENT — PAIN DESCRIPTION - ORIENTATION: ORIENTATION: RIGHT;LEFT

## 2022-04-28 ASSESSMENT — LIFESTYLE VARIABLES: HOW OFTEN DO YOU HAVE A DRINK CONTAINING ALCOHOL: NEVER

## 2022-04-28 ASSESSMENT — PAIN - FUNCTIONAL ASSESSMENT: PAIN_FUNCTIONAL_ASSESSMENT: 0-10

## 2022-04-28 ASSESSMENT — PAIN DESCRIPTION - LOCATION: LOCATION: FLANK

## 2022-04-28 NOTE — H&P
Hospital Medicine History & Physical      PCP: Jaclyn Nevarez MD    Date of Admission: 4/28/2022    Date of Service: Pt seen/examined on 4/28/22 and Admitted to Inpatient with expected LOS greater than two midnights due to medical therapy. Chief Complaint:  SOB and cough    History Of Present Illness:    80 y.o. male who presented to [facility] with SOB and cough. He notes that it started 2 to 3 days ago. He has a history of COPD. He wears 1-2L O2 at baseline. However, it was not helping and his SOB worsened. He then started coughing and noted he was bringing up a lot of mucus. He ran out of his inhaler. He decided to come into the ED. He has a h/o PE approx 7 years ago and takes xarelto daily. He smoked 1ppd for 40-50 years. Lately he has been smoking 1/3 ppd due to SOB. No smoking the past 2 days. Denies fever, chills, N/V. Past Medical History:          Diagnosis Date    Abdominal Pain     Anxiety State     BPH (benign prostatic hyperplasia) 10/3/2013    Cancer ([address] 2014    prostate    Chest pain     Colon polyp 3/27/2012    DVT, lower extremity ([address]     Left Leg    Hernia 1/24/2014    HYPERTENSION     PE (pulmonary embolism) 2/7/2014    Pt denies    Prostatitis, Hx of     SBO (small bowel obstruction) ([address] 1/24/2014       Past Surgical History:          Procedure Laterality Date    HERNIA REPAIR      INGUINAL HERNIA REPAIR Right 1/23/14    incarcerated right inguinal hernia repair    OTHER SURGICAL HISTORY  1/16/2014    ROBOTIC ASSISTED LAPAROSCOPIC PROSTATECTOMY    PROSTATE BIOPSY      TONSILLECTOMY         Medications Prior to Admission:      Prior to Admission medications    Medication Sig Start Date End Date Taking?  Authorizing Provider   aspirin EC 81 MG EC tablet Take 81 mg by mouth daily   Yes Historical Provider, MD   fluticasone (FLONASE) 50 MCG/ACT nasal spray 2 sprays by Each Nostril route daily   Yes Historical Provider, MD   tiotropium-olodaterol ([address]) 2.5-2.5 MCG/ACT AERS Inhale 2 puffs into the lungs daily   Yes Historical Provider, MD   albuterol sulfate (PROAIR RESPICLICK) 609 (90 Base) MCG/ACT aerosol powder inhalation Inhale 2 puffs into the lungs every 4 hours as needed for Wheezing or Shortness of Breath    Historical Provider, MD   rivaroxaban (XARELTO) 20 MG TABS tablet Take 20 mg by mouth Daily with supper    Historical Provider, MD   metoprolol tartrate (LOPRESSOR) 25 MG tablet Take 12.5 mg by mouth 2 times daily Take 12.5 mg BID    Historical Provider, MD   atorvastatin (LIPITOR) 80 MG tablet Take 80 mg by mouth nightly    Historical Provider, MD   Compression Stockings MISC by Does not apply route. 10/13/14   Denise Escobar MD   Compression Stockings MISC by Does not apply route. 2/11/14   Denise Escobar MD       Allergies:  Patient has no known allergies. Social History:      The patient currently lives independently. TOBACCO:   reports that he quit smoking about 5 years ago. He smoked 0.00 packs per day for 44.00 years. He has never used smokeless tobacco.  ETOH:   reports current alcohol use. E-Cigarettes/Vaping Use     Questions Responses    E-Cigarette/Vaping Use     Start Date     Passive Exposure     Quit Date     Counseling Given     Comments             Family History:      Positive as follows:        Problem Relation Age of Onset    Cancer Mother     Cancer Father     Cancer Sister     Cancer Brother     Cancer Brother         Brain       REVIEW OF SYSTEMS COMPLETED:   Pertinent positives as noted in the HPI. All other systems reviewed and negative. PHYSICAL EXAM PERFORMED:    /77   Pulse 102   Temp 98.3 °F (36.8 °C) (Oral)   Resp 20   Ht 6' 5\" (1.956 m)   SpO2 93%   BMI 24.90 kg/m²     General appearance:  NAD, chronically ill appearing. HEENT:  Normal cephalic, atraumatic without obvious deformity. Pupils equal, round, and reactive to light. Extra ocular muscles intact. Conjunctivae/corneas clear.   Neck: Supple, with full range of motion. No jugular venous distention. Trachea midline. Respiratory: Wheezing BL, decreased BS at bases. Cardiovascular:  Regular rate and rhythm with normal S1/S2 without murmurs, rubs or gallops. Abdomen: Soft, non-tender, non-distended with normal bowel sounds. Musculoskeletal:  No clubbing, cyanosis or edema bilaterally. Full range of motion without deformity. Skin: Skin color, texture, turgor normal.  No rashes or lesions. Neurologic:  Neurovascularly intact without any focal sensory/motor deficits. Cranial nerves: II-XII intact, grossly non-focal.  Psychiatric:  Alert and oriented, thought content appropriate, normal insight  Capillary Refill: Brisk,3 seconds, normal  Peripheral Pulses: +2 palpable, equal bilaterally       Labs:     Recent Labs     04/28/22  1207   WBC 9.3   HGB 15.5   HCT 47.3        Recent Labs     04/28/22  1207      K 4.4   CL 98*   CO2 28   BUN 18   CREATININE 1.4*   CALCIUM 10.9*     Recent Labs     04/28/22  1207   AST 19   ALT 18   BILITOT 0.6   ALKPHOS 108     No results for input(s): INR in the last 72 hours. No results for input(s): Reyne [place] in the last 72 hours. Urinalysis:      Lab Results   Component Value Date    NITRU Negative 04/14/2021    WBCUA 3-5 04/14/2021    BACTERIA Rare 04/14/2021    RBCUA 3-4 04/14/2021    BLOODU Negative 04/14/2021    SPECGRAV >=1.030 04/14/2021    GLUCOSEU Negative 04/14/2021    GLUCOSEU NEGATIVE 03/27/2012       Radiology:     CXR: I have reviewed the CXR with the following interpretation: increased lung volumes  EKG:  I have reviewed the EKG with the following interpretation: sinus tach, possible ischemia in infer and anterolateral leads. Similar to previous. XR CHEST PORTABLE   Final Result   Increased lung volumes, no acute process detected.              ASSESSMENT:    Active Hospital Problems    Diagnosis Date Noted    COPD with acute exacerbation ([facility] [facility] [J44.1] 04/28/2022     Priority: Medium         PLAN:    1. Acute COPD exacerbation in setting of chronic respiratory failure - base line O2 1-2L. Currently requiring 4L. Start IV Solumedrol with transition to PO prednisone, doxycycline started 4/28, Duonebs q4 hours scheduled while awake. Consider Pulmonology consult if no improvement. 2. H/O PE - continue home xarelto. 3. Tobacco use disorder - encourage cessation. Declined nicotine replacement. 4. CKD stage 3 - Cr 1.4. Near baseline. DVT Prophylaxis: xarelto  Diet: ADULT DIET; Regular  Code Status: Full Code    PT/OT Eval Status: not ordered    Dispo - 2-3 days       Jennifer Marte MD    Thank you Wendy Tapia MD for the opportunity to be involved in this patient's care. If you have any questions or concerns please feel free to contact me at [phone].

## 2022-04-28 NOTE — ED PROVIDER NOTES
CHIEF COMPLAINT  Shortness of Breath (pt has COPD-on 1.5 l/nc at home;HHN per EMS;SOB x 2 days; ran out of resques inhaler 2 days ago)      HISTORY OF PRESENT ILLNESS  Yi Webster is a 80 y.o. male with a history of COPD with chronic oxygen use as well as DVT/PE currently on Xarelto who presents to the ED complaining of shortness of breath. Patient states that over the last 2 to 3 days he has had increasing wheezing/rhonchi with nonproductive cough. Patient denies fevers, chills, or sweats. No lower extremity swelling or edema. Patient further denies any calf pain or tenderness. He arrives into the emergency department with respiratory distress on nonrebreather mask. .   No other complaints, modifying factors or associated symptoms. I have reviewed the following from the nursing documentation.     Past Medical History:   Diagnosis Date    Abdominal Pain     Anxiety State     BPH (benign prostatic hyperplasia) 10/3/2013    Cancer ([address] 2014    prostate    Chest pain     Colon polyp 3/27/2012    DVT, lower extremity (HCC)     Left Leg    Hernia 1/24/2014    HYPERTENSION     PE (pulmonary embolism) 2/7/2014    Pt denies    Prostatitis, Hx of     SBO (small bowel obstruction) ([address] 1/24/2014     Past Surgical History:   Procedure Laterality Date    HERNIA REPAIR      INGUINAL HERNIA REPAIR Right 1/23/14    incarcerated right inguinal hernia repair    OTHER SURGICAL HISTORY  1/16/2014    ROBOTIC ASSISTED LAPAROSCOPIC PROSTATECTOMY    PROSTATE BIOPSY      TONSILLECTOMY       Family History   Problem Relation Age of Onset    Cancer Mother     Cancer Father     Cancer Sister     Cancer Brother     Cancer Brother         Brain     Social History     Socioeconomic History    Marital status:      Spouse name: Not on file    Number of children: Not on file    Years of education: Not on file    Highest education level: Not on file   Occupational History    Not on file   Tobacco Use  Smoking status: Former Smoker     Packs/day: 0.00     Years: 44.00     Pack years: 0.00     Quit date: 3/17/2017     Years since quittin.1    Smokeless tobacco: Never Used   Substance and Sexual Activity    Alcohol use: Yes     Comment: rarely    Drug use: No    Sexual activity: Not on file   Other Topics Concern    Not on file   Social History Narrative    Not on file     Social Determinants of Health     Financial Resource Strain:     Difficulty of Paying Living Expenses: Not on file   Food Insecurity:     Worried About Running Out of Food in the Last Year: Not on file    Ian of Food in the Last Year: Not on file   Transportation Needs:     Lack of Transportation (Medical): Not on file    Lack of Transportation (Non-Medical):  Not on file   Physical Activity:     Days of Exercise per Week: Not on file    Minutes of Exercise per Session: Not on file   Stress:     Feeling of Stress : Not on file   Social Connections:     Frequency of Communication with Friends and Family: Not on file    Frequency of Social Gatherings with Friends and Family: Not on file    Attends Temple Services: Not on file    Active Member of Clubs or Organizations: Not on file    Attends Club or Organization Meetings: Not on file    Marital Status: Not on file   Intimate Partner Violence:     Fear of Current or Ex-Partner: Not on file    Emotionally Abused: Not on file    Physically Abused: Not on file    Sexually Abused: Not on file   Housing Stability:     Unable to Pay for Housing in the Last Year: Not on file    Number of [place] in the Last Year: Not on file    Unstable Housing in the Last Year: Not on file     Current Facility-Administered Medications   Medication Dose Route Frequency Provider Last Rate Last Admin    0.9 % sodium chloride bolus  1,000 mL IntraVENous Once [place] Perch, DO         Current Outpatient Medications   Medication Sig Dispense Refill    albuterol sulfate (PROAIR RESPICLICK) 905 (90 Base) MCG/ACT aerosol powder inhalation Inhale 2 puffs into the lungs every 4 hours as needed for Wheezing or Shortness of Breath      rivaroxaban (XARELTO) 20 MG TABS tablet Take 20 mg by mouth Daily with supper      metoprolol tartrate (LOPRESSOR) 25 MG tablet Take 12.5 mg by mouth 2 times daily Take 12.5 mg BID      atorvastatin (LIPITOR) 80 MG tablet Take 80 mg by mouth nightly      Compression Stockings MISC by Does not apply route. 1 each 0    Compression Stockings MISC by Does not apply route. 2 each 0     No Known Allergies    REVIEW OF SYSTEMS  10 systems reviewed, pertinent positives per HPI otherwise noted to be negative. PHYSICAL EXAM  BP (!) 151/96   Pulse 112   Temp 97.7 °F (36.5 °C)   Resp 22   SpO2 96%   GENERAL APPEARANCE: Awake and alert. Cooperative. Respiratory distress   HEAD: Normocephalic. Atraumatic. EYES: PERRL. EOM's grossly intact. ENT: Mucous membranes are moist.   NECK: Supple, trachea midline. HEART: Tachycardic. Normal S1, S2. No murmurs, rubs or gallops. LUNGS: Increased work of breathing. Tachypnea. Diffuse rhonchi/wheezing. ABDOMEN: Soft. Non-distended. Non-tender. No guarding or rebound. Normal Bowel sounds. EXTREMITIES: No peripheral edema. MAEE. No acute deformities. SKIN: Warm and dry. No acute rashes. NEUROLOGICAL: Alert and oriented X 3. CN II-XII intact. No gross facial drooping. Strength 5/5, sensation intact. No pronator drift. Normal coordination. PSYCHIATRIC: Normal mood and affect. LABS  I have reviewed all labs for this visit.    Results for orders placed or performed during the hospital encounter of 04/28/22   COVID-19, Rapid    Specimen: Nasopharyngeal Swab   Result Value Ref Range    SARS-CoV-2, NAAT Not Detected Not Detected   CBC with Auto Differential   Result Value Ref Range    WBC 9.3 4.0 - 11.0 K/uL    RBC 4.79 4.20 - 5.90 M/uL    Hemoglobin 15.5 13.5 - 17.5 g/dL    Hematocrit 47.3 40.5 - 52.5 %    MCV 98.8 80.0 - 100.0 fL    MCH 32.3 26.0 - 34.0 pg    MCHC 32.7 31.0 - 36.0 g/dL    RDW 13.5 12.4 - 15.4 %    Platelets 167 106 - 146 K/uL    MPV 8.1 5.0 - 10.5 fL    Neutrophils % 64.9 %    Lymphocytes % 17.5 %    Monocytes % 5.3 %    Eosinophils % 11.3 %    Basophils % 1.0 %    Neutrophils Absolute 6.1 1.7 - 7.7 K/uL    Lymphocytes Absolute 1.6 1.0 - 5.1 K/uL    Monocytes Absolute 0.5 0.0 - 1.3 K/uL    Eosinophils Absolute 1.1 (H) 0.0 - 0.6 K/uL    Basophils Absolute 0.1 0.0 - 0.2 K/uL   Comprehensive Metabolic Panel   Result Value Ref Range    Sodium 139 136 - 145 mmol/L    Potassium 4.4 3.5 - 5.1 mmol/L    Chloride 98 (L) 99 - 110 mmol/L    CO2 28 21 - 32 mmol/L    Anion Gap 13 3 - 16    Glucose 124 (H) 70 - 99 mg/dL    BUN 18 7 - 20 mg/dL    CREATININE 1.4 (H) 0.8 - 1.3 mg/dL    GFR Non-African American 50 (A) >60    GFR African American >60 >60    Calcium 10.9 (H) 8.3 - 10.6 mg/dL    Total Protein 8.3 (H) 6.4 - 8.2 g/dL    Albumin 4.9 3.4 - 5.0 g/dL    Albumin/Globulin Ratio 1.4 1.1 - 2.2    Total Bilirubin 0.6 0.0 - 1.0 mg/dL    Alkaline Phosphatase 108 40 - 129 U/L    ALT 18 10 - 40 U/L    AST 19 15 - 37 U/L   Lactate, Sepsis   Result Value Ref Range    Lactic Acid, Sepsis 2.3 (H) 0.4 - 1.9 mmol/L   Procalcitonin   Result Value Ref Range    Procalcitonin 0.07 0.00 - 0.15 ng/mL   Blood gas, venous   Result Value Ref Range    pH, [place] 7.254 (L) 7.350 - 7.450    pCO2, [place] 62.7 (H) 40.0 - 50.0 mmHg    pO2, [place] 41.8 (H) 25.0 - 40.0 mmHg    HCO3, Venous 27.1 23.0 - 29.0 mmol/L    Base Excess, Sumeet -1.8 -3.0 - 3.0 mmol/L    O2 Sat, [address] 74 Not Established %    Carboxyhemoglobin 2.1 (H) 0.0 - 1.5 %    MetHgb, Sumeet 0.5 <1.5 %    TC02 (Calc), [place] 29 Not Established mmol/L    O2 Therapy Unknown    EKG 12 Lead   Result Value Ref Range    Ventricular Rate 122 BPM    Atrial Rate 122 BPM    P-R Interval 132 ms    QRS Duration 82 ms    Q-T Interval 306 ms    QTc Calculation (Bazett) 436 ms    P Axis 89 degrees    R Axis 82 degrees    T Axis -79 degrees    Diagnosis       Sinus tachycardiaRight atrial enlargementST & T wave abnormality, consider inferior ischemiaAbnormal ECGWhen compared with ECG of 10-FEB-2022 14:30,ST now depressed in Inferior leadsST now depressed in Anterolateral leadsT wave inversion now evident in Inferior leadsT wave inversion no longer evident in Anterior leads       EKG  The Ekg interpreted by myself  Sinus tachycardia with a rate of 122. Normal axis. Normal intervals and durations. Inferior/lateral ST and T wave changes. These are consistent with previous EKG on 2/10/2022. Cardiac Monitoring: Tachycardic. RADIOLOGY  X-RAYS:  I have reviewed radiologic plain film image(s). ALL OTHER NON-PLAIN FILM IMAGES SUCH AS CT, ULTRASOUND AND MRI HAVE BEEN READ BY THE RADIOLOGIST. XR CHEST PORTABLE   Final Result   Increased lung volumes, no acute process detected. Rechecks: Physical assessment performed. 1153: O2 sats stable 92% on 6 L.      1223: O2 sats 92% on 4 L.    125: O2 sats stable on 4 L. Critical Care: Critical care 35 minutes was completed as noted above secondary concerns for acute respiratory failure          ED COURSE/MDM  Patient seen and evaluated. Old records reviewed. Labs and imaging reviewed and results discussed with patient. Patient was given supplemental oxygen, multiple nebulizer treatments and steroids in the ED with good symptomatic relief. Patient was reassessed as noted above . Patient's labs are consistent with acute respiratory acidosis. Plan of care discussed with patient and family. Patient and family in agreement with plan. Patient was given scripts for the following medications. I counseled patient how to take these medications. Current Discharge Medication List          CLINICAL IMPRESSION  1. COPD exacerbation ([facility] [address]    2. Acute respiratory failure with hypoxia (HCC)        Blood pressure (!) 151/96, pulse 112, temperature 97.7 °F (36.5 °C), resp. rate 22, SpO2 96 %. DISPOSITION  Kashmir Juarez was admitted in stable condition.          Endy Dodd DO  04/28/22 [id number]

## 2022-04-28 NOTE — CONSULTS
INPATIENT PULMONARY CRITICAL CARE CONSULT NOTE      Chief Complaint/Referring Provider:  Patient is being seen at the request of Dr. Jada Nagy  for a consultation for COPD exacerbation      Presenting HPI: Patient came to the hospital because of increasing cough and shortness of breath    As per admitting provider-71 y.o. male who presented to [facility] with SOB and cough. He notes that it started 2 to 3 days ago. He has a history of COPD. He wears 1-2L O2 at baseline. However, it was not helping and his SOB worsened. He then started coughing and noted he was bringing up a lot of mucus. He ran out of his inhaler. He decided to come into the ED. He has a h/o PE approx 7 years ago and takes xarelto daily. He smoked 1ppd for 40-50 years. Lately he has been smoking 1/3 ppd due to SOB. No smoking the past 2 days. Denies fever, chills, N/V.     Patient when seen this evening states that he has been having some increasing shortness of breath and coughing spells which are becoming progressively worse in the last few days time, patient states that he has a shortness of breath for long time but it is much more in the last few days, patient states that he has been having increased cough with chest congestion, patient has been able to bring up some secretions that time which is mucoid in nature, patient does not have any purulence or hemoptysis along with that patient has been having rhinorrhea nasal congestion sinus congestion, patient does not have any otalgia no ear discharge, patient does not have any significant epistaxis, patient does not have any significant sore throat or difficulty in swallowing, no coughing or choking while eating, no odynophagia or dysphagia per se, patient has been having increasing wheezing along with the shortness of breath, patient does not have any fever or chills, patient does not have any pleuritic chest pain, no palpitation diaphoresis, patient did not have any fever or chills,, patient does not have any significant abdominal discomfort nausea vomiting or any increased reflux symptoms, no hematochezia or melena, patient does not have any significant small joint pains, patient does not have any more than usual leg swelling, patient does not give history of any sleep fragmentation, patient has been a smoker till 3 days back, patient uses oxygen on a regular basis at home at the rate of 1-1/2 L/min along with that patient also gets inhalers from [facility], patient does not have any sick contacts or any change in them environment or exposures, patient was alert and communicative when seen, no other pertinent review of system of concern       Patient Active Problem List    Diagnosis Date Noted    COPD with acute exacerbation ([facility] 04/28/2022    Acute on chronic respiratory failure with hypoxia and hypercapnia ([address] 04/28/2022    Other emphysema ([facility] 04/28/2022    Stage 3a chronic kidney disease ([address] 04/28/2022    Current smoker 04/28/2022    Elevated lactic acid level 04/28/2022    Hypercalcemia 04/28/2022    Enlarged RV (right ventricle) 04/28/2022    Grade I diastolic dysfunction 98/38/4323    PAF (paroxysmal atrial fibrillation) ([facility] [address] 04/11/2021    COPD exacerbation ([facility] [address] 04/10/2021    History of pulmonary embolus (PE) 04/10/2021    NSVT (nonsustained ventricular tachycardia) ([address] 03/23/2017    Hx of deep venous thrombosis 03/23/2017    Depression 12/08/2014    Post-phlebitic syndrome 05/12/2014    Pulmonary embolism ([address] 02/07/2014    Lower leg DVT (deep venous thromboembolism), acute ([address] 02/06/2014    Prostate cancer ([facility] [address] 01/06/2014    Vitamin D deficiency 10/11/2013    Colon polyp 03/27/2012    Anxiety state     Essential hypertension        Past Medical History:   Diagnosis Date    Abdominal Pain     Anxiety State     BPH (benign prostatic hyperplasia) 10/3/2013    Cancer ([address] 2014    prostate    Chest pain     Colon polyp 3/27/2012    DVT, lower extremity ([address] Left Leg    Hernia 2014    HYPERTENSION     PE (pulmonary embolism) 2014    Pt denies    Prostatitis, Hx of     SBO (small bowel obstruction) ([facility] [address] 2014        Past Surgical History:   Procedure Laterality Date    HERNIA REPAIR      INGUINAL HERNIA REPAIR Right 14    incarcerated right inguinal hernia repair    OTHER SURGICAL HISTORY  2014    ROBOTIC ASSISTED LAPAROSCOPIC PROSTATECTOMY    PROSTATE BIOPSY      TONSILLECTOMY          Family History   Problem Relation Age of Onset    Cancer Mother     Cancer Father     Cancer Sister     Cancer Brother     Cancer Brother         Brain        Social History     Tobacco Use    Smoking status: Former Smoker     Packs/day: 0.00     Years: 44.00     Pack years: 0.00     Types: Cigarettes     Quit date: 2022     Years since quittin.0    Smokeless tobacco: Never Used   Substance Use Topics    Alcohol use: Not Currently     Comment: rarely        No Known Allergies            Physical Exam:  Blood pressure 139/77, pulse 93, temperature 98.3 °F (36.8 °C), temperature source Oral, resp. rate (P) 18, height 6' 5\" (1.956 m), weight 210 lb (95.3 kg), SpO2 95 %.'     Constitutional: Having mild respiratory distress with chest congestion with paroxysmal coughing when seen along with some audible wheeze  HENT:  Oropharynx is clear and moist. No thyromegaly. Eyes:  Conjunctivae are normal. Pupils equal, round, and reactive to light. No scleral icterus. Neck: . No tracheal deviation present. No obvious thyroid mass. Cardiovascular: Sinus tachycardia, normal heart sounds. No right ventricular heave. No lower extremity edema. Pulmonary/Chest: Bilateral diffuse wheezes. No rales. Chest wall is not dull to percussion. No accessory muscle usage or stridor. Decreased breath sound intensity, prolonged expiration  Abdominal: Soft. Bowel sounds present. No distension or hernia. No tenderness. Musculoskeletal: No cyanosis.  No clubbing. No obvious joint deformity. Lymphadenopathy: No cervical or supraclavicular adenopathy. Skin: Skin is warm and dry. No rash or nodules on the exposed extremities. Psychiatric: Normal mood and affect. Behavior is normal.  No anxiety. Neurologic: Alert, awake and oriented. PERRL. Speech fluent        Results:  CBC:   Recent Labs     04/28/22  1207   WBC 9.3   HGB 15.5   HCT 47.3   MCV 98.8        BMP:   Recent Labs     04/28/22  1207      K 4.4   CL 98*   CO2 28   BUN 18   CREATININE 1.4*     LIVER PROFILE:   Recent Labs     04/28/22  1207   AST 19   ALT 18   BILITOT 0.6   ALKPHOS 108       Imaging:  I have reviewed radiology images personally. XR CHEST PORTABLE   Final Result   Increased lung volumes, no acute process detected. XR CHEST PORTABLE    Result Date: 4/28/2022  EXAMINATION: ONE XRAY VIEW OF THE CHEST 4/28/2022 12:15 pm COMPARISON: 02/10/2022 HISTORY: ORDERING SYSTEM PROVIDED HISTORY: shortness of breath TECHNOLOGIST PROVIDED HISTORY: Reason for exam:->shortness of breath Reason for Exam: sob FINDINGS: Lung volumes are increased compatible with COPD/emphysema. Thoracic aorta is tortuous. Heart is normal in size. No pleural fluid or pneumonia. Unchanged apical scarring. Osseous structures are stable. Increased lung volumes, no acute process detected. Results for Dexter Bird (MRN [id number]) as of 4/28/2022 19:53   Ref.  Range 2/10/2022 12:18 2/10/2022 14:33 4/28/2022 12:07   Sodium Latest Ref Range: 136 - 145 mmol/L 140  139   Potassium Latest Ref Range: 3.5 - 5.1 mmol/L 4.5  4.4   Chloride Latest Ref Range: 99 - 110 mmol/L 100  98 (L)   CO2 Latest Ref Range: 21 - 32 mmol/L 30  28   BUN,BUNPL Latest Ref Range: 7 - 20 mg/dL 26 (H)  18   Creatinine Latest Ref Range: 0.8 - 1.3 mg/dL 1.5 (H)  1.4 (H)   Anion Gap Latest Ref Range: 3 - 16  10  13   GFR Non- Latest Ref Range: >60  46 (A)  50 (A)   GFR  Latest Ref Range: >60 56 (A)  >60   Lactic Acid Latest Ref Range: 0.4 - 2.0 mmol/L 1.7     Lactic Acid, Sepsis Latest Ref Range: 0.4 - 1.9 mmol/L   2.3 (H)   GLUCOSE, FASTING,GF Latest Ref Range: 70 - 99 mg/dL 125 (H)  124 (H)   CALCIUM, SERUM, 039031 Latest Ref Range: 8.3 - 10.6 mg/dL 9.4  10.9 (H)   Total Protein Latest Ref Range: 6.4 - 8.2 g/dL 6.8  8.3 (H)   Procalcitonin Latest Ref Range: 0.00 - 0.15 ng/mL   0.07   Troponin Latest Ref Range: <0.01 ng/mL 0.02 (H) 0.01    Albumin Latest Ref Range: 3.4 - 5.0 g/dL 4.2  4.9   Albumin/Globulin Ratio Latest Ref Range: 1.1 - 2.2  1.6  1.4   Alk Phos Latest Ref Range: 40 - 129 U/L 113  108   ALT Latest Ref Range: 10 - 40 U/L 13  18   AST Latest Ref Range: 15 - 37 U/L 17  19   Bilirubin Latest Ref Range: 0.0 - 1.0 mg/dL 0.7  0.6     Results for Hannah Curry (MRN [id number]) as of 4/28/2022 19:53   Ref. Range 4/11/2021 09:00 4/12/2021 06:03 2/10/2022 12:18 4/28/2022 12:07   WBC Latest Ref Range: 4.0 - 11.0 K/uL 13.1 (H) 15.8 (H) 9.5 9.3   RBC Latest Ref Range: 4.20 - 5.90 M/uL 4.43 4.16 (L) 4.56 4.79   Hemoglobin Quant Latest Ref Range: 13.5 - 17.5 g/dL 14.3 13.3 (L) 14.7 15.5   Hematocrit Latest Ref Range: 40.5 - 52.5 % 42.9 40.4 (L) 43.8 47.3   MCV Latest Ref Range: 80.0 - 100.0 fL 96.9 97.1 96.0 98.8   MCH Latest Ref Range: 26.0 - 34.0 pg 32.3 32.1 32.2 32.3   MCHC Latest Ref Range: 31.0 - 36.0 g/dL 33.3 33.0 33.5 32.7   MPV Latest Ref Range: 5.0 - 10.5 fL 8.8 9.6 9.1 8.1   RDW Latest Ref Range: 12.4 - 15.4 % 13.5 13.8 13.1 13.5   Platelet Count Latest Ref Range: 135 - 450 K/uL 229 230 268 297   Neutrophils % Latest Units: % 89.1  63.4 64.9   Lymphocyte % Latest Units: % 7.4  19.3 17.5   Monocytes % Latest Units: % 2.9  6.1 5.3     Results for Hannah Curry (MRN [id number]) as of 4/28/2022 19:53   Ref. Range 4/28/2022 13:02   SARS-CoV-2, NAAT Latest Ref Range: Not Detected  Not Detected     Results for Hannah Curry (MRN [id number]) as of 4/28/2022 19:53   Ref.  Range 2/10/2022 12:18 4/28/2022 12:07   Carboxyhemoglobin Latest Ref Range: 0.0 - 1.5 % 2.0 (H) 2.1 (H)   O2 Therapy Unknown Unknown Unknown   pH, Sumeet Latest Ref Range: 7.350 - 7.450  7.328 (L) 7.254 (L)   pCO2, Sumeet Latest Ref Range: 40.0 - 50.0 mmHg 62.4 (H) 62.7 (H)   pO2, Sumeet Latest Ref Range: 25.0 - 40.0 mmHg 57.0 (H) 41.8 (H)   HCO3, Venous Latest Ref Range: 23.0 - 29.0 mmol/L 32.0 (H) 27.1   TC02 (Calc), Sumeet Latest Ref Range: Not Established mmol/L 34 29   Base Excess, Sumeet Latest Ref Range: -3.0 - 3.0 mmol/L 3.9 (H) -1.8   MetHgb, Sumeet Latest Ref Range: <1.5 % 0.6 0.5   O2 Sat, Sumeet Latest Ref Range: Not Established % 89 74       Echocardiogram:Summary   Technically difficult examination. Normal left ventricular systolic function with ejection fraction of 55-60%. No regional wall motion abnormalites are seen. Moderate RV enlargement. Normal left ventricular size with mild concentric left ventricular   hypertrophy. Grade I diastolic dysfunction with normal filling pressure. Compared to previous study from 3- no changes noted in left   ventricular function. PFT: None available in Epic         Assessment:  Principal Problem:    COPD with acute exacerbation ([address]  Active Problems:    Acute on chronic respiratory failure with hypoxia and hypercapnia (HCC)    Other emphysema (HCC)    Stage 3a chronic kidney disease (HCC)    Current smoker    Elevated lactic acid level    Hypercalcemia    Enlarged RV (right ventricle)    Grade I diastolic dysfunction  Resolved Problems:    * No resolved hospital problems.  *          Plan:   · Oxygen supplementation to keep oxygen saturation between 90 and 94% only  · Please titrate the oxygen as per the above parameters  · Patient has acute on chronic respiratory failure with hypoxemia and hypercarbia and patient may benefit from a BiPAP at nighttime which is being ordered  · Initial settings for BiPAP given to the RT  · Patient to have a repeat ABG in the morning and further management as per patient clinical status and the ABG results  · Monitor for any worsening hypoventilation and hypercarbia  · Patient has been started on p.o. doxycycline which can be continued  · Bronchodilators in the form of DuoNeb to continue  · IV Solu-Medrol to continue at this time  · Patient does have chronic kidney disease along with that patient also has hypercalcemia at this time  · If patient's calcium levels are still on the higher side that patient needs evaluation as per IM  · Smoking cessation advised  · Nicotine replacement therapy if the patient wants  · Cardiac medications including anticoagulation as per IM  · Monitor input output and BMP  · Correct electrolytes on whenever necessary basis  · PUD prophylaxis     Further management depending on patient's clinical status and the follow-up on above recommendations along with ABG results      Electronically signed by:  Yobani May MD    4/28/2022    8:00 PM.

## 2022-04-28 NOTE — ED NOTES
Report called to [facility] on C5-transport requested     Zuleika Contreras RN  04/28/22 5726

## 2022-04-29 LAB
ANION GAP SERPL CALCULATED.3IONS-SCNC: 10 MMOL/L (ref 3–16)
BASE EXCESS ARTERIAL: 0.2 MMOL/L (ref -3–3)
BASOPHILS ABSOLUTE: 0 K/UL (ref 0–0.2)
BASOPHILS RELATIVE PERCENT: 0.1 %
BUN BLDV-MCNC: 27 MG/DL (ref 7–20)
CALCIUM SERPL-MCNC: 10.1 MG/DL (ref 8.3–10.6)
CARBOXYHEMOGLOBIN ARTERIAL: 0.4 % (ref 0–1.5)
CHLORIDE BLD-SCNC: 103 MMOL/L (ref 99–110)
CO2: 26 MMOL/L (ref 21–32)
CREAT SERPL-MCNC: 1.3 MG/DL (ref 0.8–1.3)
EOSINOPHILS ABSOLUTE: 0 K/UL (ref 0–0.6)
EOSINOPHILS RELATIVE PERCENT: 0 %
GFR AFRICAN AMERICAN: >60
GFR NON-AFRICAN AMERICAN: 54
GLUCOSE BLD-MCNC: 144 MG/DL (ref 70–99)
HCO3 ARTERIAL: 26.4 MMOL/L (ref 21–29)
HCT VFR BLD CALC: 40.6 % (ref 40.5–52.5)
HEMOGLOBIN, ART, EXTENDED: 14.5 G/DL (ref 13.5–17.5)
HEMOGLOBIN: 13.3 G/DL (ref 13.5–17.5)
LYMPHOCYTES ABSOLUTE: 0.6 K/UL (ref 1–5.1)
LYMPHOCYTES RELATIVE PERCENT: 5.5 %
MCH RBC QN AUTO: 32.2 PG (ref 26–34)
MCHC RBC AUTO-ENTMCNC: 32.8 G/DL (ref 31–36)
MCV RBC AUTO: 98.2 FL (ref 80–100)
METHEMOGLOBIN ARTERIAL: 0.6 %
MONOCYTES ABSOLUTE: 0.2 K/UL (ref 0–1.3)
MONOCYTES RELATIVE PERCENT: 1.4 %
NEUTROPHILS ABSOLUTE: 10.3 K/UL (ref 1.7–7.7)
NEUTROPHILS RELATIVE PERCENT: 93 %
O2 SAT, ARTERIAL: 79.5 %
O2 THERAPY: ABNORMAL
PCO2 ARTERIAL: 49 MMHG (ref 35–45)
PDW BLD-RTO: 13.4 % (ref 12.4–15.4)
PH ARTERIAL: 7.35 (ref 7.35–7.45)
PLATELET # BLD: 255 K/UL (ref 135–450)
PMV BLD AUTO: 8.2 FL (ref 5–10.5)
PO2 ARTERIAL: 42.6 MMHG (ref 75–108)
POTASSIUM REFLEX MAGNESIUM: 4.8 MMOL/L (ref 3.5–5.1)
RBC # BLD: 4.14 M/UL (ref 4.2–5.9)
SODIUM BLD-SCNC: 139 MMOL/L (ref 136–145)
TCO2 ARTERIAL: 27.9 MMOL/L
WBC # BLD: 11 K/UL (ref 4–11)

## 2022-04-29 PROCEDURE — 6370000000 HC RX 637 (ALT 250 FOR IP): Performed by: INTERNAL MEDICINE

## 2022-04-29 PROCEDURE — 80048 BASIC METABOLIC PNL TOTAL CA: CPT

## 2022-04-29 PROCEDURE — 1200000000 HC SEMI PRIVATE

## 2022-04-29 PROCEDURE — 94660 CPAP INITIATION&MGMT: CPT

## 2022-04-29 PROCEDURE — 36600 WITHDRAWAL OF ARTERIAL BLOOD: CPT

## 2022-04-29 PROCEDURE — 85025 COMPLETE CBC W/AUTO DIFF WBC: CPT

## 2022-04-29 PROCEDURE — 94761 N-INVAS EAR/PLS OXIMETRY MLT: CPT

## 2022-04-29 PROCEDURE — 36415 COLL VENOUS BLD VENIPUNCTURE: CPT

## 2022-04-29 PROCEDURE — 99232 SBSQ HOSP IP/OBS MODERATE 35: CPT | Performed by: INTERNAL MEDICINE

## 2022-04-29 PROCEDURE — 2580000003 HC RX 258: Performed by: FAMILY MEDICINE

## 2022-04-29 PROCEDURE — 2700000000 HC OXYGEN THERAPY PER DAY

## 2022-04-29 PROCEDURE — 94669 MECHANICAL CHEST WALL OSCILL: CPT

## 2022-04-29 PROCEDURE — 94640 AIRWAY INHALATION TREATMENT: CPT

## 2022-04-29 PROCEDURE — 6370000000 HC RX 637 (ALT 250 FOR IP): Performed by: FAMILY MEDICINE

## 2022-04-29 PROCEDURE — 82803 BLOOD GASES ANY COMBINATION: CPT

## 2022-04-29 PROCEDURE — 6360000002 HC RX W HCPCS: Performed by: FAMILY MEDICINE

## 2022-04-29 RX ORDER — BENZONATATE 100 MG/1
100 CAPSULE ORAL 3 TIMES DAILY PRN
Status: DISCONTINUED | OUTPATIENT
Start: 2022-04-29 | End: 2022-05-03 | Stop reason: HOSPADM

## 2022-04-29 RX ADMIN — METHYLPREDNISOLONE SODIUM SUCCINATE 40 MG: 40 INJECTION, POWDER, FOR SOLUTION INTRAMUSCULAR; INTRAVENOUS at 05:33

## 2022-04-29 RX ADMIN — PANTOPRAZOLE SODIUM 40 MG: 40 TABLET, DELAYED RELEASE ORAL at 08:45

## 2022-04-29 RX ADMIN — IPRATROPIUM BROMIDE AND ALBUTEROL SULFATE 1 AMPULE: .5; 3 SOLUTION RESPIRATORY (INHALATION) at 19:57

## 2022-04-29 RX ADMIN — IPRATROPIUM BROMIDE AND ALBUTEROL SULFATE 1 AMPULE: .5; 3 SOLUTION RESPIRATORY (INHALATION) at 12:56

## 2022-04-29 RX ADMIN — IPRATROPIUM BROMIDE AND ALBUTEROL SULFATE 1 AMPULE: .5; 3 SOLUTION RESPIRATORY (INHALATION) at 16:23

## 2022-04-29 RX ADMIN — METHYLPREDNISOLONE SODIUM SUCCINATE 40 MG: 40 INJECTION, POWDER, FOR SOLUTION INTRAMUSCULAR; INTRAVENOUS at 17:43

## 2022-04-29 RX ADMIN — METHYLPREDNISOLONE SODIUM SUCCINATE 40 MG: 40 INJECTION, POWDER, FOR SOLUTION INTRAMUSCULAR; INTRAVENOUS at 00:05

## 2022-04-29 RX ADMIN — IPRATROPIUM BROMIDE AND ALBUTEROL SULFATE 1 AMPULE: .5; 3 SOLUTION RESPIRATORY (INHALATION) at 04:30

## 2022-04-29 RX ADMIN — BENZONATATE 100 MG: 100 CAPSULE ORAL at 14:48

## 2022-04-29 RX ADMIN — ATORVASTATIN CALCIUM 80 MG: 80 TABLET, FILM COATED ORAL at 21:22

## 2022-04-29 RX ADMIN — SODIUM CHLORIDE, PRESERVATIVE FREE 10 ML: 5 INJECTION INTRAVENOUS at 08:46

## 2022-04-29 RX ADMIN — SODIUM CHLORIDE, PRESERVATIVE FREE 10 ML: 5 INJECTION INTRAVENOUS at 21:23

## 2022-04-29 RX ADMIN — METHYLPREDNISOLONE SODIUM SUCCINATE 40 MG: 40 INJECTION, POWDER, FOR SOLUTION INTRAMUSCULAR; INTRAVENOUS at 12:29

## 2022-04-29 RX ADMIN — METOPROLOL TARTRATE 12.5 MG: 25 TABLET, FILM COATED ORAL at 21:22

## 2022-04-29 RX ADMIN — RIVAROXABAN 20 MG: 20 TABLET, FILM COATED ORAL at 17:43

## 2022-04-29 RX ADMIN — BENZONATATE 100 MG: 100 CAPSULE ORAL at 21:28

## 2022-04-29 RX ADMIN — IPRATROPIUM BROMIDE AND ALBUTEROL SULFATE 1 AMPULE: .5; 3 SOLUTION RESPIRATORY (INHALATION) at 23:25

## 2022-04-29 RX ADMIN — METHYLPREDNISOLONE SODIUM SUCCINATE 40 MG: 40 INJECTION, POWDER, FOR SOLUTION INTRAMUSCULAR; INTRAVENOUS at 23:50

## 2022-04-29 RX ADMIN — IPRATROPIUM BROMIDE AND ALBUTEROL SULFATE 1 AMPULE: .5; 3 SOLUTION RESPIRATORY (INHALATION) at 09:02

## 2022-04-29 RX ADMIN — METOPROLOL TARTRATE 12.5 MG: 25 TABLET, FILM COATED ORAL at 08:45

## 2022-04-29 RX ADMIN — DOXYCYCLINE HYCLATE 100 MG: 100 TABLET, COATED ORAL at 21:22

## 2022-04-29 RX ADMIN — DOXYCYCLINE HYCLATE 100 MG: 100 TABLET, COATED ORAL at 08:45

## 2022-04-29 ASSESSMENT — ENCOUNTER SYMPTOMS: TACHYPNEA: 1

## 2022-04-29 NOTE — PROGRESS NOTES
INPATIENT PULMONARY CRITICAL CARE PROGRESS NOTE      Reason for visit    COPD exacerbation     SUBJECTIVE: Patient when seen this morning was feeling minimally better, patient did not have any proximal coughing or audible wheezing, patient was given BiPAP last night and patient states that he used it but he did not like it, patient does not have any significant expectoration at this time, patient does not have chest pain or palpitations, patient was on 4 L of nasal cannula oxygen with saturation of 96% when seen, patient was afebrile, patient was hemodynamically maintained, patient had sinus rhythm on the monitor, patient urine output has not been recorded in the epic for review, patient blood sugars are slightly on the higher side but acceptable, patient was alert and oriented, no other pertinent review of system of concern            Physical Exam:  Blood pressure 125/74, pulse 81, temperature 98 °F (36.7 °C), temperature source Oral, resp. rate 24, height 6' 5\" (1.956 m), weight 210 lb (95.3 kg), SpO2 91 %.'     Constitutional:  In no significant respiratory distress when seen this morning  HENT:  Oropharynx is clear and moist. No thyromegaly. Eyes:  Conjunctivae are normal. Pupils equal, round, and reactive to light. No scleral icterus. Neck: . No tracheal deviation present. No obvious thyroid mass. Cardiovascular: Sinus t rhythm, normal heart sounds. No right ventricular heave. No lower extremity edema. Pulmonary/Chest: Bilateral decreased wheezes. No rales. Chest wall is not dull to percussion. No accessory muscle usage or stridor. Decreased breath sound intensity, prolonged expiration  Abdominal: Soft. Bowel sounds present. No distension or hernia. No tenderness. Musculoskeletal: No cyanosis. No clubbing. No obvious joint deformity. Lymphadenopathy: No cervical or supraclavicular adenopathy. Skin: Skin is warm and dry. No rash or nodules on the exposed extremities.   Psychiatric: Normal mood and affect. Behavior is normal.  No anxiety. Neurologic: Alert, awake and oriented. PERRL. Speech fluent     Results:  CBC:   Recent Labs     04/28/22  1207 04/29/22  0625   WBC 9.3 11.0   HGB 15.5 13.3*   HCT 47.3 40.6   MCV 98.8 98.2    255     BMP:   Recent Labs     04/28/22  1207 04/29/22  0625    139   K 4.4 4.8   CL 98* 103   CO2 28 26   BUN 18 27*   CREATININE 1.4* 1.3     LIVER PROFILE:   Recent Labs     04/28/22  1207   AST 19   ALT 18   BILITOT 0.6   ALKPHOS 108       Imaging:  I have reviewed radiology images personally. XR CHEST PORTABLE   Final Result   Increased lung volumes, no acute process detected. XR CHEST PORTABLE    Result Date: 4/28/2022  EXAMINATION: ONE XRAY VIEW OF THE CHEST 4/28/2022 12:15 pm COMPARISON: 02/10/2022 HISTORY: ORDERING SYSTEM PROVIDED HISTORY: shortness of breath TECHNOLOGIST PROVIDED HISTORY: Reason for exam:->shortness of breath Reason for Exam: sob FINDINGS: Lung volumes are increased compatible with COPD/emphysema. Thoracic aorta is tortuous. Heart is normal in size. No pleural fluid or pneumonia. Unchanged apical scarring. Osseous structures are stable. Increased lung volumes, no acute process detected. Results for Shwetha Khan (MRN [id number]) as of 4/29/2022 12:56   Ref.  Range 2/10/2022 12:18 4/28/2022 12:07 4/29/2022 05:03   O2 Therapy Unknown Unknown Unknown See comment   Hemoglobin, Art, Extended Latest Ref Range: 13.5 - 17.5 g/dL   14.5   pH, Arterial Latest Ref Range: 7.350 - 7.450    7.350   pCO2, Arterial Latest Ref Range: 35.0 - 45.0 mmHg   49.0 (H)   pO2, Arterial Latest Ref Range: 75.0 - 108.0 mmHg   42.6 (L)   HCO3, Arterial Latest Ref Range: 21.0 - 29.0 mmol/L   26.4   TCO2 (calc), Art Latest Ref Range: Not Established mmol/L   27.9   Base Excess, Arterial Latest Ref Range: -3.0 - 3.0 mmol/L   0.2   O2 Sat, Arterial Latest Ref Range: >92 %   79.5 (L)   Methemoglobin, Arterial Latest Ref Range: <1.5 %   0.6 Carboxyhgb, Arterial Latest Ref Range: 0.0 - 1.5 %   0.4   pH, Sumeet Latest Ref Range: 7.350 - 7.450  7.328 (L) 7.254 (L)    pCO2, Sumeet Latest Ref Range: 40.0 - 50.0 mmHg 62.4 (H) 62.7 (H)    pO2, Sumeet Latest Ref Range: 25.0 - 40.0 mmHg 57.0 (H) 41.8 (H)    HCO3, Venous Latest Ref Range: 23.0 - 29.0 mmol/L 32.0 (H) 27.1    TC02 (Calc), Sumeet Latest Ref Range: Not Established mmol/L 34 29    Base Excess, Sumeet Latest Ref Range: -3.0 - 3.0 mmol/L 3.9 (H) -1.8    MetHgb, Sumeet Latest Ref Range: <1.5 % 0.6 0.5    O2 Sat, Sumeet Latest Ref Range: Not Established % 89 74            Assessment:  Principal Problem:    COPD with acute exacerbation (HCC)  Active Problems:    Acute on chronic respiratory failure with hypoxia and hypercapnia (HCC)    Other emphysema (HCC)    Stage 3a chronic kidney disease (HCC)    Current smoker    Elevated lactic acid level    Hypercalcemia    Enlarged RV (right ventricle)    Grade I diastolic dysfunction  Resolved Problems:    * No resolved hospital problems.  *          Plan:   · Oxygen supplementation to keep oxygen saturation between 90 and 94% only  · Please titrate the oxygen as per the above parameters  · Patient has acute on chronic respiratory failure with hypoxemia and hypercarbia and patient was given BiPAP overnight which she used but did not like it and will not like to use it on a chronic basis  · Patient's acid-base balance has improved with the BiPAP  · Monitor for any worsening hypoventilation and hypercarbia  · Patient has been started on p.o. doxycycline which can be continued  · Bronchodilators in the form of DuoNeb to continue  · IV Solu-Medrol to continue at this time  · Patient does have chronic kidney disease along with that patient also has hypercalcemia at this time  · Patient's calcium levels have improved as compared to yesterday  · Smoking cessation advised  · Nicotine replacement therapy if the patient wants  · Cardiac medications including anticoagulation as per IM  · Monitor input output and BMP  · Correct electrolytes on whenever necessary basis  · PUD prophylaxis           Electronically signed by:  Sol Mcburney, MD    4/29/2022    9:49 AM.

## 2022-04-29 NOTE — PROGRESS NOTES
Comprehensive Nutrition Assessment    Type and Reason for Visit:  Initial,Positive Nutrition Screen    Nutrition Recommendations/Plan:   1. Continue regular diet and encourage PO intake   2. RD to add ensure TID, chocolate   3. RD to order new updated weight   4. Consider SLP consult- pt reports difficulty swallowing, food gets stuck in throat  5. Monitor nutrition adequacy, pertinent labs, bowel habits, wt changes, and clinical progress     Malnutrition Assessment:  Malnutrition Status: At risk for malnutrition (Comment) (04/29/22 1326)    Context:  Acute Illness     Findings of the 6 clinical characteristics of malnutrition:  Energy Intake:  Mild decrease in energy intake (Comment)    Nutrition Assessment:    Positive nutrition screen for decreased appetite and wt loss: Pt w/ PMH of COPD admitted w/ SOB and cough. On regular diet. Pt reports poor appetite and intake PTA x 3 weeks, poor appetite reported during lunch, pt had consume 25% of meal. Able to eat % of breakfast today. Pt endorses wt loss, unsure of how much, UBW= 210 lb. ELIZABETH wt changes d/t stated wt, will order new updated weight. NO N/V reported. Pt reports difficulty swallowing, food gets stuck in throat, pt may benefit from SLP consult ? Pt drinks boost at home, willing to trial ensure here, RD to add TID. Continue to encourage PO intake, will continue to monitor. Nutrition Related Findings:    Labs reviewed. No BM since wednesday, per pt. Pt reports difficulty swallowing. Wound Type: None       Current Nutrition Intake & Therapies:    Average Meal Intake: % (1 PO)  Average Supplements Intake: None Ordered  ADULT DIET; Regular    Anthropometric Measures:  Height: 6' 5\" (195.6 cm)  Ideal Body Weight (IBW): 208 lbs (95 kg)       Current Body Weight: 210 lb (95.3 kg), 101 % IBW.  Weight Source: Stated  Current BMI (kg/m2): 24.9  Usual Body Weight: 210 lb (95.3 kg) (per pt)  % Weight Change (Calculated): 0                 BMI Categories: Normal Weight (BMI 18.5-24. 9)    Estimated Daily Nutrient Needs:  Energy Requirements Based On: Kcal/kg (22-28 kcals/kg)  Weight Used for Energy Requirements: Ideal (95 kg)  Energy (kcal/day): 5496-7723 kcals  Weight Used for Protein Requirements: Ideal (95 kg IBW, 1-1.2 g/kg)  Protein (g/day):  g  Method Used for Fluid Requirements: 1 ml/kcal    Nutrition Diagnosis:   · Inadequate oral intake related to inadequate protein-energy intake as evidenced by poor intake prior to admission,weight loss      Nutrition Interventions:   Food and/or Nutrient Delivery: Continue Current Diet,Start Oral Nutrition Supplement  Nutrition Education/Counseling: Education not indicated  Coordination of Nutrition Care: Continue to monitor while inpatient       Goals:     Goals: PO intake 50% or greater,prior to discharge       Nutrition Monitoring and Evaluation:   Behavioral-Environmental Outcomes: None Identified  Food/Nutrient Intake Outcomes: Food and Nutrient Intake,Supplement Intake  Physical Signs/Symptoms Outcomes: Biochemical Data,Constipation,Weight,Nutrition Focused Physical Findings    Discharge Planning:    Continue current diet,Continue Oral Nutrition Supplement     Tomasa Boucher, [address],   Contact: Office: [phone]; [address]: [phone]

## 2022-04-29 NOTE — PROGRESS NOTES
04/29/22 0424   NIV Type   NIV Started/Stopped On   Equipment Type v60   Mode Bilevel   Mask Type Full face mask   Mask Size Large   Settings/Measurements   PIP Observed 15 cm H20   IPAP 14 cmH20   CPAP/EPAP 6 cmH2O   Rate Ordered 16   Resp (!) 31   Insp Rise Time (%) 1 %   FiO2  35 %   I Time/ I Time % 1 s   Vt Exhaled 517 mL   Minute Volume 16 Liters   Mask Leak (lpm) 115 lpm  (pt has beard )   Comfort Level Good   Using Accessory Muscles No   SpO2 93   Patient's Home Machine No   Patient Observation   Observations found pt to be awake, uncomfortable on bipap   Alarm Settings   Alarms On Y   Low Pressure 6 cmH2O   High Pressure  30 cmH2O   Delay Alarm 20 sec(s)   RR Low  6   RR High 40 br/min

## 2022-04-29 NOTE — CARE COORDINATION
4/29/22 Attempted to assess pt multiple times today. pt resting, no family at bedside. Per previous notes pt lives home alone and has home o2 with [facility]. Will assess as able.

## 2022-04-29 NOTE — PROGRESS NOTES
forund pt to be awake on bipap and pt wanted to be off of bipap, ran br tx with HHN, pt asking questionns regarding bipap and ABG.   Pt wanted off ofbipap for the night, agreed to do ABG early to show results of NIV

## 2022-04-29 NOTE — PROGRESS NOTES
04/28/22 8787   NIV Type   $NIV $Daily Charge   Skin Protection for O2 Device Yes   Location Nose   NIV Started/Stopped On   Equipment Type v60   Mode Bilevel   Mask Type Full face mask   Mask Size Large   Settings/Measurements   PIP Observed 15 cm H20   IPAP 14 cmH20   CPAP/EPAP 6 cmH2O   Rate Ordered 16   Resp 22   Insp Rise Time (%) 1 %   FiO2  35 %   I Time/ I Time % 1 s   Vt Exhaled 841 mL   Minute Volume 18.9 Liters   Mask Leak (lpm) 130 lpm  (pt has beard )   Comfort Level Good   Using Accessory Muscles No   SpO2 98   Patient's Home Machine No   Breath Sounds   Right Upper Lobe Expiratory Wheezes   Right Middle Lobe Expiratory Wheezes   Right Lower Lobe Diminished   Left Upper Lobe Expiratory Wheezes   Left Lower Lobe Diminished   Alarm Settings   Alarms On Y   Low Pressure 6 cmH2O   High Pressure  30 cmH2O   Delay Alarm 20 sec(s)   RR Low  6   RR High 40 br/min

## 2022-04-29 NOTE — PROGRESS NOTES
Hospitalist Progress Note      PCP: Leon Yadav MD    Date of Admission: 4/28/2022    Chief Complaint: SOB and Cough    Subjective: no new c/o. Medications:  Reviewed    Infusion Medications    sodium chloride       Scheduled Medications    atorvastatin  80 mg Oral Nightly    metoprolol tartrate  12.5 mg Oral BID    rivaroxaban  20 mg Oral Dinner    sodium chloride flush  5-40 mL IntraVENous 2 times per day    methylPREDNISolone  40 mg IntraVENous Q6H    Followed by   Jailyn Ya ON 5/1/2022] predniSONE  40 mg Oral Daily    doxycycline hyclate  100 mg Oral 2 times per day    pantoprazole  40 mg Oral QAM AC    ipratropium-albuterol  1 ampule Inhalation Q4H     PRN Meds: sodium chloride flush, sodium chloride, ondansetron **OR** ondansetron, polyethylene glycol, acetaminophen **OR** acetaminophen    No intake or output data in the 24 hours ending 04/29/22 0842    Physical Exam Performed:    /61   Pulse 77   Temp 98.4 °F (36.9 °C) (Oral)   Resp (!) 31   Ht 6' 5\" (1.956 m)   Wt 210 lb (95.3 kg)   SpO2 96%   BMI 24.90 kg/m²     General appearance: No apparent distress, appears stated age and cooperative. HEENT: Pupils equal, round, and reactive to light. Conjunctivae/corneas clear. Neck: Supple, with full range of motion. No jugular venous distention. Trachea midline. Respiratory:  Normal respiratory effort. Clear to auscultation, bilaterally without Rales/Wheezes/Rhonchi. Cardiovascular: Regular rate and rhythm with normal S1/S2 without murmurs, rubs or gallops. Abdomen: Soft, non-tender, non-distended with normal bowel sounds. Musculoskeletal: No clubbing, cyanosis or edema bilaterally. Full range of motion without deformity. Skin: Skin color, texture, turgor normal.  No rashes or lesions. Neurologic:  Neurovascularly intact without any focal sensory/motor deficits.  Cranial nerves: II-XII intact, grossly non-focal.  Psychiatric: Alert and oriented, thought content appropriate, normal insight  Capillary Refill: Brisk,< 3 seconds   Peripheral Pulses: +2 palpable, equal bilaterally       Labs:   Recent Labs     04/28/22  1207 04/29/22  0625   WBC 9.3 11.0   HGB 15.5 13.3*   HCT 47.3 40.6    255     Recent Labs     04/28/22  1207 04/29/22  0625    139   K 4.4 4.8   CL 98* 103   CO2 28 26   BUN 18 27*   CREATININE 1.4* 1.3   CALCIUM 10.9* 10.1     Recent Labs     04/28/22  1207   AST 19   ALT 18   BILITOT 0.6   ALKPHOS 108     No results for input(s): INR in the last 72 hours. No results for input(s): Ana Bas[place] in the last 72 hours. Urinalysis:      Lab Results   Component Value Date    NITRU Negative 04/14/2021    WBCUA 3-5 04/14/2021    BACTERIA Rare 04/14/2021    RBCUA 3-4 04/14/2021    BLOODU Negative 04/14/2021    SPECGRAV >=1.030 04/14/2021    GLUCOSEU Negative 04/14/2021    GLUCOSEU NEGATIVE 03/27/2012       Consults:    IP CONSULT TO PULMONOLOGY      Assessment/Plan:    Active Hospital Problems    Diagnosis     COPD with acute exacerbation ([facility] [J44.1]      Priority: Medium    Acute on chronic respiratory failure with hypoxia and hypercapnia (HCC) [J96.21, J96.22]      Priority: Medium    Other emphysema ([facility] [J43.8]      Priority: Medium    Stage 3a chronic kidney disease ([facility] [N18.31]      Priority: Medium    Current smoker [F17.200]      Priority: Medium    Elevated lactic acid level [R79.89]      Priority: Medium    Hypercalcemia [E83.52]      Priority: Medium    Enlarged RV (right ventricle) [I51.7]      Priority: Medium    Grade I diastolic dysfunction [C80.80]      Priority: Medium       COPD - w/ chronic respiratory failure on baseline home O2 at 1-2 LPM.  Normally controlled on home medication regimen - continued. Here w/ acute exacerbation and started on IV Steroids/Doxy and HHN. Pulmonology consulted and appreciated.      Acute on Chronic Respiratory Failure - w/ progressive hypoxia, POArrival.  Presence of clinical respiratory distress w/ tachypnea/dyspnea/SOB and wheezing w/ use of accessory muscles to breath. Supplemental O2 and wean as tolerated. Nocturnal Bipap as ordered by Pulmonology. Acute COPD exacerbation in setting of chronic respiratory failure - base line O2 1-2L. Currently requiring 4L. Start IV Solumedrol with transition to PO prednisone, doxycycline started 4/28, Duonebs q4 hours scheduled while awake.     Hx PE - anticoagulated at baseline on home Xarelto - continued.     Tobacco Abuse - active and ongoing. Cessation counseled. Nicotine replacement PRN. CKD - baseline stage 3. Follow serial labs. Reviewed and documented as above.       DVT Prophylaxis: Xarelto     Recent Labs     04/28/22  1207 04/29/22  0625    255     Diet: ADULT DIET; Regular  Code Status: Full Code      PT/OT Eval Status: not yet ordered. Dispo - Patient is likely to remain in-house at least until Sat/Jimi 30 April/1 May pending clinical course. [employer].        Cecily Almonte MD

## 2022-04-30 PROBLEM — N18.9 CKD (CHRONIC KIDNEY DISEASE): Status: ACTIVE | Noted: 2022-04-30

## 2022-04-30 PROBLEM — J96.20 ACUTE ON CHRONIC RESPIRATORY FAILURE (HCC): Status: ACTIVE | Noted: 2022-04-28

## 2022-04-30 LAB
ALBUMIN SERPL-MCNC: 3.9 G/DL (ref 3.4–5)
ANION GAP SERPL CALCULATED.3IONS-SCNC: 8 MMOL/L (ref 3–16)
BUN BLDV-MCNC: 45 MG/DL (ref 7–20)
CALCIUM SERPL-MCNC: 10.4 MG/DL (ref 8.3–10.6)
CHLORIDE BLD-SCNC: 105 MMOL/L (ref 99–110)
CO2: 29 MMOL/L (ref 21–32)
CREAT SERPL-MCNC: 1.5 MG/DL (ref 0.8–1.3)
GFR AFRICAN AMERICAN: 56
GFR NON-AFRICAN AMERICAN: 46
GLUCOSE BLD-MCNC: 150 MG/DL (ref 70–99)
HCT VFR BLD CALC: 39.5 % (ref 40.5–52.5)
HEMOGLOBIN: 12.9 G/DL (ref 13.5–17.5)
MCH RBC QN AUTO: 32.2 PG (ref 26–34)
MCHC RBC AUTO-ENTMCNC: 32.8 G/DL (ref 31–36)
MCV RBC AUTO: 98.2 FL (ref 80–100)
PDW BLD-RTO: 13.8 % (ref 12.4–15.4)
PHOSPHORUS: 3 MG/DL (ref 2.5–4.9)
PLATELET # BLD: 238 K/UL (ref 135–450)
PMV BLD AUTO: 8.4 FL (ref 5–10.5)
POTASSIUM SERPL-SCNC: 4.9 MMOL/L (ref 3.5–5.1)
RBC # BLD: 4.02 M/UL (ref 4.2–5.9)
SODIUM BLD-SCNC: 142 MMOL/L (ref 136–145)
WBC # BLD: 14.4 K/UL (ref 4–11)

## 2022-04-30 PROCEDURE — 94640 AIRWAY INHALATION TREATMENT: CPT

## 2022-04-30 PROCEDURE — 99232 SBSQ HOSP IP/OBS MODERATE 35: CPT | Performed by: INTERNAL MEDICINE

## 2022-04-30 PROCEDURE — 2700000000 HC OXYGEN THERAPY PER DAY

## 2022-04-30 PROCEDURE — 6370000000 HC RX 637 (ALT 250 FOR IP): Performed by: INTERNAL MEDICINE

## 2022-04-30 PROCEDURE — 97165 OT EVAL LOW COMPLEX 30 MIN: CPT

## 2022-04-30 PROCEDURE — 94660 CPAP INITIATION&MGMT: CPT

## 2022-04-30 PROCEDURE — 36415 COLL VENOUS BLD VENIPUNCTURE: CPT

## 2022-04-30 PROCEDURE — 6360000002 HC RX W HCPCS: Performed by: FAMILY MEDICINE

## 2022-04-30 PROCEDURE — 80069 RENAL FUNCTION PANEL: CPT

## 2022-04-30 PROCEDURE — 1200000000 HC SEMI PRIVATE

## 2022-04-30 PROCEDURE — 6370000000 HC RX 637 (ALT 250 FOR IP): Performed by: FAMILY MEDICINE

## 2022-04-30 PROCEDURE — 2580000003 HC RX 258: Performed by: FAMILY MEDICINE

## 2022-04-30 PROCEDURE — 85027 COMPLETE CBC AUTOMATED: CPT

## 2022-04-30 PROCEDURE — 97530 THERAPEUTIC ACTIVITIES: CPT

## 2022-04-30 PROCEDURE — 94669 MECHANICAL CHEST WALL OSCILL: CPT

## 2022-04-30 RX ORDER — CODEINE PHOSPHATE AND GUAIFENESIN 10; 100 MG/5ML; MG/5ML
10 SOLUTION ORAL EVERY 4 HOURS PRN
Status: DISCONTINUED | OUTPATIENT
Start: 2022-04-30 | End: 2022-05-03 | Stop reason: HOSPADM

## 2022-04-30 RX ADMIN — SODIUM CHLORIDE, PRESERVATIVE FREE 10 ML: 5 INJECTION INTRAVENOUS at 09:10

## 2022-04-30 RX ADMIN — GUAIFENESIN AND CODEINE PHOSPHATE 10 ML: 100; 10 SOLUTION ORAL at 17:30

## 2022-04-30 RX ADMIN — PANTOPRAZOLE SODIUM 40 MG: 40 TABLET, DELAYED RELEASE ORAL at 09:03

## 2022-04-30 RX ADMIN — IPRATROPIUM BROMIDE AND ALBUTEROL SULFATE 1 AMPULE: .5; 3 SOLUTION RESPIRATORY (INHALATION) at 20:19

## 2022-04-30 RX ADMIN — RIVAROXABAN 20 MG: 20 TABLET, FILM COATED ORAL at 16:41

## 2022-04-30 RX ADMIN — DOXYCYCLINE HYCLATE 100 MG: 100 TABLET, COATED ORAL at 09:03

## 2022-04-30 RX ADMIN — ACETAMINOPHEN 650 MG: 325 TABLET ORAL at 12:19

## 2022-04-30 RX ADMIN — METOPROLOL TARTRATE 12.5 MG: 25 TABLET, FILM COATED ORAL at 20:42

## 2022-04-30 RX ADMIN — IPRATROPIUM BROMIDE AND ALBUTEROL SULFATE 1 AMPULE: .5; 3 SOLUTION RESPIRATORY (INHALATION) at 08:09

## 2022-04-30 RX ADMIN — POLYETHYLENE GLYCOL 3350 17 G: 17 POWDER, FOR SOLUTION ORAL at 11:22

## 2022-04-30 RX ADMIN — METHYLPREDNISOLONE SODIUM SUCCINATE 40 MG: 40 INJECTION, POWDER, FOR SOLUTION INTRAMUSCULAR; INTRAVENOUS at 05:39

## 2022-04-30 RX ADMIN — IPRATROPIUM BROMIDE AND ALBUTEROL SULFATE 1 AMPULE: .5; 3 SOLUTION RESPIRATORY (INHALATION) at 03:36

## 2022-04-30 RX ADMIN — DOXYCYCLINE HYCLATE 100 MG: 100 TABLET, COATED ORAL at 20:42

## 2022-04-30 RX ADMIN — GUAIFENESIN AND CODEINE PHOSPHATE 10 ML: 100; 10 SOLUTION ORAL at 21:45

## 2022-04-30 RX ADMIN — ATORVASTATIN CALCIUM 80 MG: 80 TABLET, FILM COATED ORAL at 20:42

## 2022-04-30 RX ADMIN — IPRATROPIUM BROMIDE AND ALBUTEROL SULFATE 1 AMPULE: .5; 3 SOLUTION RESPIRATORY (INHALATION) at 11:48

## 2022-04-30 RX ADMIN — BENZONATATE 100 MG: 100 CAPSULE ORAL at 16:41

## 2022-04-30 RX ADMIN — GUAIFENESIN AND CODEINE PHOSPHATE 10 ML: 100; 10 SOLUTION ORAL at 12:10

## 2022-04-30 RX ADMIN — IPRATROPIUM BROMIDE AND ALBUTEROL SULFATE 1 AMPULE: .5; 3 SOLUTION RESPIRATORY (INHALATION) at 23:36

## 2022-04-30 RX ADMIN — BENZONATATE 100 MG: 100 CAPSULE ORAL at 09:03

## 2022-04-30 RX ADMIN — BENZONATATE 100 MG: 100 CAPSULE ORAL at 21:45

## 2022-04-30 RX ADMIN — SODIUM CHLORIDE, PRESERVATIVE FREE 10 ML: 5 INJECTION INTRAVENOUS at 20:42

## 2022-04-30 RX ADMIN — IPRATROPIUM BROMIDE AND ALBUTEROL SULFATE 1 AMPULE: .5; 3 SOLUTION RESPIRATORY (INHALATION) at 15:37

## 2022-04-30 RX ADMIN — METHYLPREDNISOLONE SODIUM SUCCINATE 40 MG: 40 INJECTION, POWDER, FOR SOLUTION INTRAMUSCULAR; INTRAVENOUS at 11:22

## 2022-04-30 RX ADMIN — METOPROLOL TARTRATE 12.5 MG: 25 TABLET, FILM COATED ORAL at 09:04

## 2022-04-30 ASSESSMENT — PAIN SCALES - GENERAL: PAINLEVEL_OUTOF10: 6

## 2022-04-30 NOTE — PROGRESS NOTES
Hospitalist Progress Note      PCP: Marylu Fishman MD    Date of Admission: 4/28/2022    Chief Complaint: SOB and Cough    Subjective: no new c/o. Medications:  Reviewed    Infusion Medications    sodium chloride       Scheduled Medications    atorvastatin  80 mg Oral Nightly    metoprolol tartrate  12.5 mg Oral BID    rivaroxaban  20 mg Oral Dinner    sodium chloride flush  5-40 mL IntraVENous 2 times per day    methylPREDNISolone  40 mg IntraVENous Q6H    Followed by   Neeraj Gore ON 5/1/2022] predniSONE  40 mg Oral Daily    doxycycline hyclate  100 mg Oral 2 times per day    pantoprazole  40 mg Oral QAM AC    ipratropium-albuterol  1 ampule Inhalation Q4H     PRN Meds: benzonatate, sodium chloride flush, sodium chloride, ondansetron **OR** ondansetron, polyethylene glycol, acetaminophen **OR** acetaminophen      Intake/Output Summary (Last 24 hours) at 4/30/2022 0614  Last data filed at 4/29/2022 1746  Gross per 24 hour   Intake 960 ml   Output --   Net 960 ml       Physical Exam Performed:    BP (!) 143/75   Pulse 85   Temp 98.2 °F (36.8 °C) (Oral)   Resp 17   Ht 6' 5\" (1.956 m)   Wt 210 lb (95.3 kg)   SpO2 93%   BMI 24.90 kg/m²     General appearance: No apparent distress, appears stated age and cooperative. HEENT: Pupils equal, round, and reactive to light. Conjunctivae/corneas clear. Neck: Supple, with full range of motion. No jugular venous distention. Trachea midline. Respiratory:  Normal respiratory effort. Clear to auscultation, bilaterally without Rales/Wheezes/Rhonchi. Cardiovascular: Regular rate and rhythm with normal S1/S2 without murmurs, rubs or gallops. Abdomen: Soft, non-tender, non-distended with normal bowel sounds. Musculoskeletal: No clubbing, cyanosis or edema bilaterally. Full range of motion without deformity. Skin: Skin color, texture, turgor normal.  No rashes or lesions. Neurologic:  Neurovascularly intact without any focal sensory/motor deficits. Cranial nerves: II-XII intact, grossly non-focal.  Psychiatric: Alert and oriented, thought content appropriate, normal insight  Capillary Refill: Brisk,< 3 seconds   Peripheral Pulses: +2 palpable, equal bilaterally       Labs:   Recent Labs     04/28/22  1207 04/29/22  0625   WBC 9.3 11.0   HGB 15.5 13.3*   HCT 47.3 40.6    255     Recent Labs     04/28/22  1207 04/29/22  0625    139   K 4.4 4.8   CL 98* 103   CO2 28 26   BUN 18 27*   CREATININE 1.4* 1.3   CALCIUM 10.9* 10.1     Recent Labs     04/28/22  1207   AST 19   ALT 18   BILITOT 0.6   ALKPHOS 108     No results for input(s): INR in the last 72 hours. No results for input(s): Yung Snowball in the last 72 hours. Urinalysis:      Lab Results   Component Value Date    NITRU Negative 04/14/2021    WBCUA 3-5 04/14/2021    BACTERIA Rare 04/14/2021    RBCUA 3-4 04/14/2021    BLOODU Negative 04/14/2021    SPECGRAV >=1.030 04/14/2021    GLUCOSEU Negative 04/14/2021    GLUCOSEU NEGATIVE 03/27/2012       Consults:    IP CONSULT TO PULMONOLOGY      Assessment/Plan:    Active Hospital Problems    Diagnosis     CKD (chronic kidney disease) [N18.9]      Priority: Medium    COPD with acute exacerbation ([address] [J44.1]      Priority: Medium    Acute on chronic respiratory failure (HCC) [J96.20]      Priority: Medium    Essential hypertension [I10]        COPD - w/ chronic respiratory failure on baseline home O2 at 1-2 LPM.  Normally controlled on home medication regimen - continued. Here w/ acute exacerbation and started on IV Steroids/Doxy and HHN. Pulmonology consulted and appreciated. Acute on Chronic Respiratory Failure - w/ progressive hypoxia, POArrival.  Presence of clinical respiratory distress w/ tachypnea/dyspnea/SOB and wheezing w/ use of accessory muscles to breath. Supplemental O2 and wean as tolerated. Nocturnal Bipap as ordered by Pulmonology.      Acute COPD exacerbation in setting of chronic respiratory failure - base line O2 1-2L. Currently requiring 4L. Start IV Solumedrol with transition to PO prednisone, Dxycycline started 4/28, Duonebs q4 hours scheduled while awake.     Hx PE - anticoagulated at baseline on home Xarelto - continued.     Tobacco Abuse - active and ongoing. Cessation counseled. Nicotine replacement PRN. CKD - baseline stage 3. Follow serial labs. Reviewed and documented as above.       DVT Prophylaxis: Xarelto     Recent Labs     04/28/22  1207 04/29/22  0625    255     Diet: ADULT DIET; Regular  ADULT ORAL NUTRITION SUPPLEMENT; Breakfast, Lunch, Dinner; Standard High Calorie/High Protein Oral Supplement  Code Status: Full Code      PT/OT Eval Status: ordered and pending. Dispo - Patient is likely to remain in-house at least until Sunday/Monday 1/2 May pending clinical course and subspecialty recs. [facility].        Lance Mcmanus MD

## 2022-04-30 NOTE — PROGRESS NOTES
Occupational Therapy  Facility/Department: [place] [address] - MED SURG/ORTHO  Occupational Therapy Initial Assessment    Name: Kristie Fields  : 1950  MRN: [id number]  Date of Service: 2022    Discharge Recommendations:  Home with Home health OT  OT Equipment Recommendations  Equipment Needed: Yes  Mobility Devices: ADL Assistive Devices  ADL Assistive Devices: Shower Chair with back       Patient Diagnosis(es): The primary encounter diagnosis was COPD exacerbation ([address] A diagnosis of Acute respiratory failure with hypoxia (HCC) was also pertinent to this visit. Past Medical History:  has a past medical history of Abdominal Pain, Anxiety State, BPH (benign prostatic hyperplasia), Cancer ([address] Chest pain, Colon polyp, DVT, lower extremity ([address] Hernia, HYPERTENSION, PE (pulmonary embolism), Prostatitis, Hx of, and SBO (small bowel obstruction) ([address] Past Surgical History:  has a past surgical history that includes hernia repair; Prostate biopsy; Tonsillectomy; other surgical history (2014); and Inguinal hernia repair (Right, 14). Treatment Diagnosis: muscle weakness      Assessment   Performance deficits / Impairments: Decreased endurance;Decreased sensation;Decreased vision/visual deficit  Assessment: Pt presents with COPD exacerbation. Pt requires cues for pursed lip breathing. Pt able to manage bed mobility with SBA, transfers with CGA, ambulation with CGA. Pt completes ADLs with SBA. Pt however, demos decreased activity tolerance. Educated pt on importance of energy conservation and knowing limitations. Pt reports his understanding.  Pt will benefit from increased skilled OT while admitted in hospital.  Treatment Diagnosis: muscle weakness  Prognosis: Good  Decision Making: Low Complexity  Activity Tolerance  Activity Tolerance: Patient Tolerated treatment well        Plan   Plan  Times per Week: 3-4/week  Current Treatment Recommendations: Strengthening,Balance training,Functional mobility training,Endurance training,Safety education & training,Self-Care / ADL     Restrictions  Restrictions/Precautions  Restrictions/Precautions: Bed Alarm,Up as Tolerated,Fall Risk  Position Activity Restriction  Other position/activity restrictions: 4L O2    Subjective   General  Chart Reviewed: Yes  Family / Caregiver Present: No  Subjective  Subjective: Agreeble to OT     Social/Functional History  Social/Functional History  Lives With: Alone  Type of Home: Apartment  Home Layout: One level  Home Access: Stairs to enter with rails  Entrance Stairs - Number of Steps: 21  Entrance Stairs - Rails: Both  Bathroom Shower/Tub: Tub/Shower unit  Bathroom Toilet: Standard  Bathroom Equipment:  (not bathroom equipment)  Has the patient had two or more falls in the past year or any fall with injury in the past year?: No  ADL Assistance: Independent  Homemaking Assistance: Needs assistance (pt reports daughter does grocery shopping and laundry)  Homemaking Responsibilities: Yes  Ambulation Assistance: Independent  Transfer Assistance: Independent  Active : Yes  Occupation: Retired       Objective   Pulse: 77  Heart Rate Source: Monitor  BP: 135/81  BP Location: Right upper arm  MAP (Calculated): 99  Resp: 22  SpO2: (!) 89 %  O2 Device: Nasal cannula  Vision Exceptions: Wears glasses for reading  Hearing: Within functional limits          Safety Devices  Type of Devices: Call light within reach; Left in bed (pt reports ambulating to/from bathroom, no alarm engaged)  Bed Mobility Training  Bed Mobility Training: Yes  Overall Level of Assistance: Stand-by assistance  Supine to Sit: Stand-by assistance  Sit to Supine: Stand-by assistance  Scooting: Stand-by assistance  Balance  Sitting: Intact  Standing: Intact  Transfer Training  Transfer Training: Yes  Overall Level of Assistance: Contact-guard assistance  Sit to Stand: Contact-guard assistance  Stand to Sit: Contact-guard assistance  Toilet Transfer: Stand-by assistance  Gait  Overall Level of Assistance: Contact-guard assistance  Toilet Transfers  Toilet - Technique: Ambulating  Equipment Used: Standard toilet  Toilet Transfer: Contact guard assistance     ADL  LE Dressing: Stand by assistance  Toileting: Stand by assistance        Bed mobility  Supine to Sit: Stand by assistance  Sit to Supine: Stand by assistance  Transfers  Sit to stand: Contact guard assistance  Stand to sit: Contact guard assistance     Cognition  Overall Cognitive Status: WFL                  Education Given To: Patient  Education Provided: Role of Therapy;Plan of Care;Energy Conservation;Transfer Training;ADL Adaptive Strategies  Education Method: Verbal  Barriers to Learning: None  LUE PROM (degrees)  LUE PROM: WFL  LUE AROM (degrees)  LUE AROM : WFL  Left Hand PROM (degrees)  Left Hand PROM: WFL  Left Hand AROM (degrees)  Left Hand AROM: WFL  RUE PROM (degrees)  RUE PROM: WFL  RUE AROM (degrees)  RUE AROM : WFL  Right Hand PROM (degrees)  Right Hand PROM: WFL  Right Hand AROM (degrees)  Right Hand AROM: [facility]                     AM-PAC Score        AM-[facility] Inpatient Daily Activity Raw Score: 19 (04/30/22 1457)  AM-PAC Inpatient ADL T-Scale Score : 40.22 (04/30/22 1457)  ADL Inpatient CMS 0-100% Score: 42.8 (04/30/22 1457)  ADL Inpatient CMS G-Code Modifier : CK (04/30/22 1457)    Goals  Short Term Goals  Time Frame for Short term goals: 7 days 5/7/22  Short Term Goal 1: Pt will complete grooming while standing at sink with SBA  Short Term Goal 2: Pt will manage ADLs while maintaing O2 saturation above 90%  Short Term Goal 3: Pt will complete functional transfers with SUP  Patient Goals   Patient goals :  To return home       Therapy Time   Individual Concurrent Group Co-treatment   Time In 1420         Time Out 1443         Minutes 23         Timed Code Treatment Minutes: 13 Minutes (10 minute eval)       Sylvie Hernandez OT

## 2022-04-30 NOTE — PROGRESS NOTES
04/30/22 0348   NIV Type   $NIV $Daily Charge   Skin Assessment Clean, dry, & intact   Skin Protection for O2 Device Yes   Location Nose   Equipment Type V60   Mode Bilevel   Mask Type Full face mask   Mask Size Large   Settings/Measurements   IPAP 14 cmH20   CPAP/EPAP 6 cmH2O   Resp 17   FiO2  35 %   Vt Exhaled 890 mL   Minute Volume 14 Liters   Mask Leak (lpm) 56 lpm   Comfort Level Good   Using Accessory Muscles No

## 2022-04-30 NOTE — PROGRESS NOTES
Patient: Willian Valdez MRN: [id number]  Date of  Admission: 4/28/2022   YOB: 1950  Age: 80 y.o.   Sex: male    Unit: [place] [address] MED SURG/ORTHO  Room/Bed: 1712/9725-20 Admitting Physician:     Attending Physician:  Ismael Brito MD         Pulmonary Service Note      SUBJECTIVE:    Patient was working with occupational therapy and was able to get up and move around  Neck patient is normally on 1-1/2 L of oxygen at home  Patient is on 4 L of oxygen here in the hospital  No chest pains or palpitations  No nausea vomiting  No dysuria hematuria  Hemoptysis    ROS:  A comprehensive review of systems was negative except for: above    OBJECTIVE    Medications    Continuous Infusions:   sodium chloride         Scheduled Meds:   atorvastatin  80 mg Oral Nightly    metoprolol tartrate  12.5 mg Oral BID    rivaroxaban  20 mg Oral Dinner    sodium chloride flush  5-40 mL IntraVENous 2 times per day    [START ON 5/1/2022] predniSONE  40 mg Oral Daily    doxycycline hyclate  100 mg Oral 2 times per day    pantoprazole  40 mg Oral QAM AC    ipratropium-albuterol  1 ampule Inhalation Q4H       PRN Meds:  guaiFENesin-codeine, benzonatate, sodium chloride flush, sodium chloride, ondansetron **OR** ondansetron, polyethylene glycol, acetaminophen **OR** acetaminophen    Physical    Patient Vitals for the past 24 hrs:   BP Temp Temp src Pulse Resp SpO2   04/30/22 1547 -- -- -- -- -- 94 %   04/30/22 1537 -- -- -- -- 18 95 %   04/30/22 1426 -- -- -- 77 -- (!) 89 %   04/30/22 1148 -- -- -- -- 22 --   04/30/22 0900 135/81 98.3 °F (36.8 °C) Oral 87 20 90 %   04/30/22 0809 -- -- -- -- 16 94 %   04/30/22 0348 -- -- -- -- 17 --   04/30/22 0338 -- -- -- -- -- 93 %   04/29/22 2355 (!) 143/75 98.2 °F (36.8 °C) Oral 85 14 92 %   04/29/22 2342 -- -- -- -- 16 --   04/29/22 2328 -- -- -- -- -- 95 %   04/29/22 2000 -- -- -- -- -- 94 %   04/29/22 1933 121/71 98.2 °F (36.8 °C) Oral 81 16 95 %        Physical Exam  Vitals and nursing note reviewed. Constitutional:       General: He is not in acute distress. Appearance: Normal appearance. HENT:      Head: Normocephalic and atraumatic. Right Ear: External ear normal.      Left Ear: External ear normal.      Nose: Nose normal.      Mouth/Throat:      Mouth: Mucous membranes are moist.      Pharynx: Oropharynx is clear. Eyes:      General:         Right eye: No discharge. Left eye: No discharge. Extraocular Movements: Extraocular movements intact. Conjunctiva/sclera: Conjunctivae normal.      Pupils: Pupils are equal, round, and reactive to light. Cardiovascular:      Rate and Rhythm: Normal rate and regular rhythm. Pulses: Normal pulses. Heart sounds: No murmur heard. Pulmonary:      Effort: No respiratory distress. Breath sounds: No stridor. No wheezing, rhonchi or rales. Chest:      Chest wall: No tenderness. Abdominal:      General: Bowel sounds are normal. There is no distension. Palpations: Abdomen is soft. There is no mass. Tenderness: There is no abdominal tenderness. Hernia: No hernia is present. Musculoskeletal:         General: No swelling. Normal range of motion. Cervical back: Normal range of motion. No rigidity. Skin:     General: Skin is warm and dry. Coloration: Skin is not jaundiced or pale. Neurological:      General: No focal deficit present. Mental Status: He is alert and oriented to person, place, and time. Mental status is at baseline. Cranial Nerves: No cranial nerve deficit. Sensory: No sensory deficit. Psychiatric:         Mood and Affect: Mood normal.         Behavior: Behavior normal.         Thought Content:  Thought content normal.              Weight  Weight change:       Labs:   Recent Labs     04/28/22  1207 04/29/22  0625 04/30/22  0552   WBC 9.3 11.0 14.4*   HGB 15.5 13.3* 12.9*   HCT 47.3 40.6 39.5*    255 238      Recent Labs     04/28/22  1207 04/29/22  0625 04/30/22  0552    139 142   K 4.4 4.8 4.9   CL 98* 103 105   CO2 28 26 29   BUN 18 27* 45*   GLUCOSE 124* 144* 150*       Additional labs      Radiology, images personally reviewed. Not indicated        Assessment:     Principal Problem:    COPD with acute exacerbation ([facility]  Active Problems:    Acute on chronic respiratory failure (HCC)    CKD (chronic kidney disease)    Essential hypertension  Resolved Problems:    * No resolved hospital problems. *      Discussion /Plan:     COPD  Wean oxygen as tolerated, normally on 1-1/2 L of oxygen at home, on 4 L of oxygen here  Agree with  Prednisone  Doxycycline    Patient was on [address] upon arrival to the hospital  Based on COPD guidelines would probably recommend that the patient go home on a triple therapy  Either Breztri or Trelegy        Acute respiratory failure  Wean oxygen as tolerated          Ava Kocher, MD [facility]  [phone]      Please note that some or all of this record was generated using voice recognition software. If there are any questions about the content of this document, please contact the author as some errors in transcription may have occurred.

## 2022-04-30 NOTE — PROGRESS NOTES
ALFRED ESTRADA    SENT-FYI Pt is having some LLQ pain this morning. Noted that he had not had a BM since 4/27. Gave some PRN mirlax with apple juice. Waiting to see if any thing happens from here. Thanks    SENT-Patient cough is also still bother some. Is there another medication that can be prescribed for cough relief. Thanks    NO)GUAIFENESIN W/CODEINE    SENT- Are you okay with PT/OT orders for patient? NO)PT/OT EVAL AND TX    SENT-Patient requesting some saline spray. States his nose is still dry with humidified oxygen in place. Patient removed very large booger with blood on it.

## 2022-04-30 NOTE — PROGRESS NOTES
04/29/22 2342   NIV Type   Skin Assessment Clean, dry, & intact   Skin Protection for O2 Device Yes   Location Nose   Equipment Type V60   Mode Bilevel   Mask Type Full face mask   Mask Size Large   Settings/Measurements   IPAP 14 cmH20   CPAP/EPAP 6 cmH2O   Resp 16   FiO2  35 %   Vt Exhaled 780 mL   Minute Volume 12 Liters   Mask Leak (lpm) 83 lpm   Comfort Level Good   Using Accessory Muscles No   SpO2 93

## 2022-05-01 PROBLEM — R09.89 CHEST CONGESTION: Status: ACTIVE | Noted: 2022-05-01

## 2022-05-01 PROBLEM — T17.500A MUCUS PLUGGING OF BRONCHI: Status: ACTIVE | Noted: 2022-05-01

## 2022-05-01 PROBLEM — R06.89 INEFFECTIVE AIRWAY CLEARANCE: Status: ACTIVE | Noted: 2022-05-01

## 2022-05-01 LAB
ALBUMIN SERPL-MCNC: 3.9 G/DL (ref 3.4–5)
ANION GAP SERPL CALCULATED.3IONS-SCNC: 6 MMOL/L (ref 3–16)
BUN BLDV-MCNC: 54 MG/DL (ref 7–20)
CALCIUM SERPL-MCNC: 10.2 MG/DL (ref 8.3–10.6)
CHLORIDE BLD-SCNC: 103 MMOL/L (ref 99–110)
CO2: 34 MMOL/L (ref 21–32)
CREAT SERPL-MCNC: 1.4 MG/DL (ref 0.8–1.3)
GFR AFRICAN AMERICAN: >60
GFR NON-AFRICAN AMERICAN: 50
GLUCOSE BLD-MCNC: 108 MG/DL (ref 70–99)
HCT VFR BLD CALC: 38.7 % (ref 40.5–52.5)
HEMOGLOBIN: 12.8 G/DL (ref 13.5–17.5)
MCH RBC QN AUTO: 32.4 PG (ref 26–34)
MCHC RBC AUTO-ENTMCNC: 33.2 G/DL (ref 31–36)
MCV RBC AUTO: 97.6 FL (ref 80–100)
PDW BLD-RTO: 13.4 % (ref 12.4–15.4)
PHOSPHORUS: 3.2 MG/DL (ref 2.5–4.9)
PLATELET # BLD: 241 K/UL (ref 135–450)
PMV BLD AUTO: 8.3 FL (ref 5–10.5)
POTASSIUM SERPL-SCNC: 4.7 MMOL/L (ref 3.5–5.1)
RBC # BLD: 3.97 M/UL (ref 4.2–5.9)
SODIUM BLD-SCNC: 143 MMOL/L (ref 136–145)
WBC # BLD: 12.7 K/UL (ref 4–11)

## 2022-05-01 PROCEDURE — 6370000000 HC RX 637 (ALT 250 FOR IP): Performed by: INTERNAL MEDICINE

## 2022-05-01 PROCEDURE — 2580000003 HC RX 258: Performed by: FAMILY MEDICINE

## 2022-05-01 PROCEDURE — 80069 RENAL FUNCTION PANEL: CPT

## 2022-05-01 PROCEDURE — 94669 MECHANICAL CHEST WALL OSCILL: CPT

## 2022-05-01 PROCEDURE — 97162 PT EVAL MOD COMPLEX 30 MIN: CPT

## 2022-05-01 PROCEDURE — 94761 N-INVAS EAR/PLS OXIMETRY MLT: CPT

## 2022-05-01 PROCEDURE — 97116 GAIT TRAINING THERAPY: CPT

## 2022-05-01 PROCEDURE — 99232 SBSQ HOSP IP/OBS MODERATE 35: CPT | Performed by: INTERNAL MEDICINE

## 2022-05-01 PROCEDURE — 1200000000 HC SEMI PRIVATE

## 2022-05-01 PROCEDURE — 85027 COMPLETE CBC AUTOMATED: CPT

## 2022-05-01 PROCEDURE — 97530 THERAPEUTIC ACTIVITIES: CPT

## 2022-05-01 PROCEDURE — 36415 COLL VENOUS BLD VENIPUNCTURE: CPT

## 2022-05-01 PROCEDURE — 94640 AIRWAY INHALATION TREATMENT: CPT

## 2022-05-01 PROCEDURE — 6370000000 HC RX 637 (ALT 250 FOR IP): Performed by: FAMILY MEDICINE

## 2022-05-01 PROCEDURE — 94660 CPAP INITIATION&MGMT: CPT

## 2022-05-01 PROCEDURE — 2700000000 HC OXYGEN THERAPY PER DAY

## 2022-05-01 RX ADMIN — ATORVASTATIN CALCIUM 80 MG: 80 TABLET, FILM COATED ORAL at 20:48

## 2022-05-01 RX ADMIN — BENZONATATE 100 MG: 100 CAPSULE ORAL at 16:16

## 2022-05-01 RX ADMIN — IPRATROPIUM BROMIDE AND ALBUTEROL SULFATE 1 AMPULE: .5; 3 SOLUTION RESPIRATORY (INHALATION) at 11:23

## 2022-05-01 RX ADMIN — PANTOPRAZOLE SODIUM 40 MG: 40 TABLET, DELAYED RELEASE ORAL at 09:06

## 2022-05-01 RX ADMIN — METOPROLOL TARTRATE 12.5 MG: 25 TABLET, FILM COATED ORAL at 20:48

## 2022-05-01 RX ADMIN — PREDNISONE 40 MG: 20 TABLET ORAL at 09:06

## 2022-05-01 RX ADMIN — DOXYCYCLINE HYCLATE 100 MG: 100 TABLET, COATED ORAL at 09:06

## 2022-05-01 RX ADMIN — SODIUM CHLORIDE, PRESERVATIVE FREE 10 ML: 5 INJECTION INTRAVENOUS at 20:49

## 2022-05-01 RX ADMIN — POLYETHYLENE GLYCOL 3350 17 G: 17 POWDER, FOR SOLUTION ORAL at 09:13

## 2022-05-01 RX ADMIN — IPRATROPIUM BROMIDE AND ALBUTEROL SULFATE 1 AMPULE: .5; 3 SOLUTION RESPIRATORY (INHALATION) at 15:47

## 2022-05-01 RX ADMIN — IPRATROPIUM BROMIDE AND ALBUTEROL SULFATE 1 AMPULE: .5; 3 SOLUTION RESPIRATORY (INHALATION) at 23:36

## 2022-05-01 RX ADMIN — IPRATROPIUM BROMIDE AND ALBUTEROL SULFATE 1 AMPULE: .5; 3 SOLUTION RESPIRATORY (INHALATION) at 07:42

## 2022-05-01 RX ADMIN — METOPROLOL TARTRATE 12.5 MG: 25 TABLET, FILM COATED ORAL at 09:06

## 2022-05-01 RX ADMIN — SODIUM CHLORIDE, PRESERVATIVE FREE 10 ML: 5 INJECTION INTRAVENOUS at 09:08

## 2022-05-01 RX ADMIN — BENZONATATE 100 MG: 100 CAPSULE ORAL at 09:06

## 2022-05-01 RX ADMIN — IPRATROPIUM BROMIDE AND ALBUTEROL SULFATE 1 AMPULE: .5; 3 SOLUTION RESPIRATORY (INHALATION) at 19:44

## 2022-05-01 RX ADMIN — RIVAROXABAN 20 MG: 20 TABLET, FILM COATED ORAL at 16:16

## 2022-05-01 RX ADMIN — IPRATROPIUM BROMIDE AND ALBUTEROL SULFATE 1 AMPULE: .5; 3 SOLUTION RESPIRATORY (INHALATION) at 03:50

## 2022-05-01 RX ADMIN — GUAIFENESIN AND CODEINE PHOSPHATE 10 ML: 100; 10 SOLUTION ORAL at 10:08

## 2022-05-01 RX ADMIN — DOXYCYCLINE HYCLATE 100 MG: 100 TABLET, COATED ORAL at 20:48

## 2022-05-01 NOTE — PROGRESS NOTES
INPATIENT PULMONARY CRITICAL CARE PROGRESS NOTE      Reason for visit    COPD exacerbation     SUBJECTIVE: Patient when seen this morning was continued to have chest congestion, patient is not able to expectorate and think significant, patient continues to have some shortness of breath and continues to be symptomatic, patient was afebrile and hemodynamically maintained, patient was on 2 L of nasal oxygen with saturation of 93%, patient had sinus rhythm on the monitor, patient urine output has not been recorded in epic for review, patient's glycemic control was acceptable,, patient was alert and communicative, no other pertinent review of system of concern            Physical Exam:  Blood pressure 122/76, pulse 70, temperature 98.1 °F (36.7 °C), temperature source Oral, resp. rate 20, height 6' 5\" (1.956 m), weight 210 lb (95.3 kg), SpO2 93 %.'     Constitutional:  In no significant respiratory distress when seen this morning, patient does have some increased chest congestion with audible congestion,  HENT:  Oropharynx is clear and moist. No thyromegaly. Eyes:  Conjunctivae are normal. Pupils equal, round, and reactive to light. No scleral icterus. Neck: . No tracheal deviation present. No obvious thyroid mass. Cardiovascular: Sinus t rhythm, normal heart sounds. No right ventricular heave. No lower extremity edema. Pulmonary/Chest: Bilateral decreased wheezes. Scattered rales. Increased chest congestion Chest wall is not dull to percussion. No accessory muscle usage or stridor. Decreased breath sound intensity, prolonged expiration  Abdominal: Soft. Bowel sounds present. No distension or hernia. No tenderness. Musculoskeletal: No cyanosis. No clubbing. No obvious joint deformity. Lymphadenopathy: No cervical or supraclavicular adenopathy. Skin: Skin is warm and dry. No rash or nodules on the exposed extremities. Psychiatric: Normal mood and affect. Behavior is normal.  No anxiety.    Neurologic: Alert, awake and oriented. PERRL. Speech fluent     Results:  CBC:   Recent Labs     04/29/22  0625 04/30/22  0552 05/01/22  0624   WBC 11.0 14.4* 12.7*   HGB 13.3* 12.9* 12.8*   HCT 40.6 39.5* 38.7*   MCV 98.2 98.2 97.6    238 241     BMP:   Recent Labs     04/29/22  0625 04/30/22  0552 05/01/22  0624    142 143   K 4.8 4.9 4.7    105 103   CO2 26 29 34*   PHOS  --  3.0 3.2   BUN 27* 45* 54*   CREATININE 1.3 1.5* 1.4*       Imaging:  I have reviewed radiology images personally. XR CHEST PORTABLE   Final Result   Increased lung volumes, no acute process detected. XR CHEST PORTABLE    Result Date: 4/28/2022  EXAMINATION: ONE XRAY VIEW OF THE CHEST 4/28/2022 12:15 pm COMPARISON: 02/10/2022 HISTORY: ORDERING SYSTEM PROVIDED HISTORY: shortness of breath TECHNOLOGIST PROVIDED HISTORY: Reason for exam:->shortness of breath Reason for Exam: sob FINDINGS: Lung volumes are increased compatible with COPD/emphysema. Thoracic aorta is tortuous. Heart is normal in size. No pleural fluid or pneumonia. Unchanged apical scarring. Osseous structures are stable. Increased lung volumes, no acute process detected. Assessment:  Principal Problem:    COPD with acute exacerbation (HCC)  Active Problems:    Acute on chronic respiratory failure (HCC)    CKD (chronic kidney disease)    Chest congestion    Mucus plugging of bronchi    Ineffective airway clearance    Essential hypertension  Resolved Problems:    * No resolved hospital problems.  *          Plan:   · Oxygen supplementation to keep oxygen saturation between 90 and 94% only  · Please titrate the oxygen as per the above parameters  · Patient has acute on chronic respiratory failure with hypoxemia and hypercarbia and patient was given BiPAP overnight which she used but did not like it and will not like to use it on a chronic basis  · Patient's acid-base balance has improved with the BiPAP  · Monitor for any worsening hypoventilation and hypercarbia  · Patient has been started on p.o. doxycycline which can be continued  · Bronchodilators in the form of DuoNeb to continue  · IV Solu-Medrol to continue at this time  · Patient has increasing chest congestion with mucus plugging with ineffective airway clearance which is not improving with conservative management  · Patient may benefit from a therapeutic bronchoscopy-patient was told about the procedure along with the pros and cons and patient wanted to proceed with that-patient will be kept n.p.o. after midnight for the procedure for tomorrow morning  · Patient does have chronic kidney disease along with that patient also has hypercalcemia at this time  · Patient's calcium levels have improved as compared to yesterday  · Smoking cessation advised  · Nicotine replacement therapy if the patient wants  · Cardiac medications including anticoagulation as per IM  · Monitor input output and BMP  · Correct electrolytes on whenever necessary basis  · PUD prophylaxis     Further management depending on patient's clinical status and the bronchoscopy findings        Electronically signed by:  Merlinda Pott, MD    5/1/2022    2:12 PM.

## 2022-05-01 NOTE — PROGRESS NOTES
04/30/22 2336   NIV Type   Skin Assessment Clean, dry, & intact   Skin Protection for O2 Device Yes   Equipment Type v60   Mode Bilevel   Mask Type Full face mask   Mask Size Large   Settings/Measurements   IPAP 14 cmH20   CPAP/EPAP 6 cmH2O   Rate Ordered 10   Resp 19   FiO2  30 %   Vt Exhaled 771 mL   Minute Volume 11.8 Liters   Mask Leak (lpm) 52 lpm   Comfort Level Good   Using Accessory Muscles No   SpO2 94   Alarm Settings   Alarms On Y   Low Pressure 6 cmH2O   High Pressure  30 cmH2O   Delay Alarm 20 sec(s)   RR Low  6   RR High 40 br/min   Oxygen Therapy/Pulse Ox   SpO2 94 %

## 2022-05-01 NOTE — PROGRESS NOTES
Hospitalist Progress Note      PCP: Wendy Tapia MD    Date of Admission: 4/28/2022    Chief Complaint: SOB and Cough    Subjective: no new c/o. Medications:  Reviewed    Infusion Medications    sodium chloride       Scheduled Medications    atorvastatin  80 mg Oral Nightly    metoprolol tartrate  12.5 mg Oral BID    rivaroxaban  20 mg Oral Dinner    sodium chloride flush  5-40 mL IntraVENous 2 times per day    predniSONE  40 mg Oral Daily    doxycycline hyclate  100 mg Oral 2 times per day    pantoprazole  40 mg Oral QAM AC    ipratropium-albuterol  1 ampule Inhalation Q4H     PRN Meds: sodium chloride, guaiFENesin-codeine, benzonatate, sodium chloride flush, sodium chloride, ondansetron **OR** ondansetron, polyethylene glycol, acetaminophen **OR** acetaminophen      Intake/Output Summary (Last 24 hours) at 5/1/2022 0853  Last data filed at 4/30/2022 2042  Gross per 24 hour   Intake 970 ml   Output --   Net 970 ml       Physical Exam Performed:    /74   Pulse 98   Temp 98.3 °F (36.8 °C) (Oral)   Resp 21   Ht 6' 5\" (1.956 m)   Wt 210 lb (95.3 kg)   SpO2 96%   BMI 24.90 kg/m²     General appearance: No apparent distress, appears stated age and cooperative. HEENT: Pupils equal, round, and reactive to light. Conjunctivae/corneas clear. Neck: Supple, with full range of motion. No jugular venous distention. Trachea midline. Respiratory:  Normal respiratory effort. Clear to auscultation, bilaterally without Rales/Wheezes/Rhonchi. Cardiovascular: Regular rate and rhythm with normal S1/S2 without murmurs, rubs or gallops. Abdomen: Soft, non-tender, non-distended with normal bowel sounds. Musculoskeletal: No clubbing, cyanosis or edema bilaterally. Full range of motion without deformity. Skin: Skin color, texture, turgor normal.  No rashes or lesions. Neurologic:  Neurovascularly intact without any focal sensory/motor deficits.  Cranial nerves: II-XII intact, grossly non-focal.  Psychiatric: Alert and oriented, thought content appropriate, normal insight  Capillary Refill: Brisk,< 3 seconds   Peripheral Pulses: +2 palpable, equal bilaterally       Labs:   Recent Labs     04/29/22  0625 04/30/22  0552 05/01/22  0624   WBC 11.0 14.4* 12.7*   HGB 13.3* 12.9* 12.8*   HCT 40.6 39.5* 38.7*    238 241     Recent Labs     04/29/22  0625 04/30/22  0552 05/01/22  0624    142 143   K 4.8 4.9 4.7    105 103   CO2 26 29 34*   BUN 27* 45* 54*   CREATININE 1.3 1.5* 1.4*   CALCIUM 10.1 10.4 10.2   PHOS  --  3.0 3.2     Recent Labs     04/28/22  1207   AST 19   ALT 18   BILITOT 0.6   ALKPHOS 108     No results for input(s): INR in the last 72 hours. No results for input(s): Reyne Chinese in the last 72 hours. Urinalysis:      Lab Results   Component Value Date    NITRU Negative 04/14/2021    WBCUA 3-5 04/14/2021    BACTERIA Rare 04/14/2021    RBCUA 3-4 04/14/2021    BLOODU Negative 04/14/2021    SPECGRAV >=1.030 04/14/2021    GLUCOSEU Negative 04/14/2021    GLUCOSEU NEGATIVE 03/27/2012       Consults:    IP CONSULT TO PULMONOLOGY      Assessment/Plan:    Active Hospital Problems    Diagnosis     CKD (chronic kidney disease) [N18.9]      Priority: Medium    COPD with acute exacerbation ([facility] [address] [J44.1]      Priority: Medium    Acute on chronic respiratory failure (HCC) [J96.20]      Priority: Medium    Essential hypertension [I10]        COPD - w/ chronic respiratory failure on baseline home O2 at 1-2 LPM.  Normally controlled on home medication regimen - continued. Acute COPD exacerbation in setting of chronic respiratory failure - base line O2 1-2L. Currently requiring 4L. Started IV Solumedrol with transition to PO Prednisone 1 May. Doxycycline started 28 April - continued. Continue HHN.  Pulmonology consulted and appreciated.      Acute on Chronic Respiratory Failure - w/ progressive hypoxia, POArrival.  Presence of clinical respiratory distress w/ tachypnea/dyspnea/SOB and wheezing w/ use of accessory muscles to breath. Supplemental O2 and wean as tolerated. Nocturnal Bipap as ordered by Pulmonology - consulted and appreciated. Hx PE - anticoagulated at baseline on home Xarelto - continued.     Tobacco Abuse - active and ongoing. Cessation counseled. Nicotine replacement PRN. CKD - baseline stage 3. Follow serial labs - stable-ange  Reviewed and documented as above.       DVT Prophylaxis: Xarelto     Recent Labs     04/29/22  0625 04/30/22  0552 05/01/22  0624    238 241     Diet: ADULT DIET; Regular  ADULT ORAL NUTRITION SUPPLEMENT; Breakfast, Lunch, Dinner; Standard High Calorie/High Protein Oral Supplement  Code Status: Full Code      PT/OT Eval Status: ordered and pending. Dispo - Patient is likely to remain in-house at least until Monday/Tues 2/3 May pending clinical course, subspecialty recs and PT/OT eval/recs. [employer].        Cecily Almonte MD

## 2022-05-01 NOTE — PROGRESS NOTES
05/01/22 0350   NIV Type   Skin Protection for O2 Device Yes   Equipment Type v60   Mode Bilevel   Mask Type Full face mask   Mask Size Large   Settings/Measurements   IPAP 15 cmH20   CPAP/EPAP 5 cmH2O   Rate Ordered 10   Resp 21   FiO2  30 %   Vt Exhaled 665 mL   Minute Volume 10.9 Liters   Mask Leak (lpm) 64 lpm   Comfort Level Fair   Using Accessory Muscles No   SpO2 95   Alarm Settings   Alarms On Y   Low Pressure 6 cmH2O   High Pressure  30 cmH2O   Delay Alarm 20 sec(s)   RR Low  6   RR High 40 br/min   Oxygen Therapy/Pulse Ox   SpO2 95 %

## 2022-05-01 NOTE — PROGRESS NOTES
Reached out to Case Management [phone] regarding patients discharge planning. Discharge planner discussed that PT/OT still needed to see patient.

## 2022-05-01 NOTE — PROGRESS NOTES
Physical Therapy  Facility/Department: [place] [address] - MED SURG/ORTHO  Physical Therapy Initial Assessment/Treatment    Name: Joanna Cook  : 1950  MRN: [id number]  Date of Service: 2022    Discharge Recommendations:  Home with Home health PT   PT Equipment Recommendations  Equipment Needed: No (CTA for RW if balance deficits persist)      Patient Diagnosis(es): The primary encounter diagnosis was COPD exacerbation ([facility] [address] A diagnosis of Acute respiratory failure with hypoxia (HCC) was also pertinent to this visit. Past Medical History:  has a past medical history of Abdominal Pain, Anxiety State, BPH (benign prostatic hyperplasia), Cancer ([address] Chest pain, Colon polyp, DVT, lower extremity ([address] Hernia, HYPERTENSION, PE (pulmonary embolism), Prostatitis, Hx of, and SBO (small bowel obstruction) ([facility] [address] Past Surgical History:  has a past surgical history that includes hernia repair; Prostate biopsy; Tonsillectomy; other surgical history (2014); and Inguinal hernia repair (Right, 14). Assessment   Body Structures, Functions, Activity Limitations Requiring Skilled Therapeutic Intervention: Decreased functional mobility ; Decreased endurance;Decreased balance;Decreased strength  Assessment: Pt presents to [facility] with COPD exacerbation. PTA, pt independent with functional mobility and has 3 flights of steps into apartment. Daughter available to help as needed. Currently, pt functioning below baseline, requires CGA-min(A) for ambulation and has 2 notable LOB requiring assist. Pt would benefit from continued skilled PT to address deficits.  Recommend home with HHPT upon d/c  Treatment Diagnosis: impaired functional mobility  Therapy Prognosis: Good  Decision Making: Medium Complexity  Requires PT Follow-Up: Yes  Activity Tolerance  Activity Tolerance: Patient tolerated evaluation without incident;Patient limited by endurance  Activity Tolerance Comments: SpO2 90% on 2 L at rest; SpO2 90% on 3 L with ambulation, reports RPE 5/10    Disease Specific Education: Pt educated on importance of OOB mobility, prevention of complications of bedrest, and general safety during hospitalization.  Pt verbalized understanding    Plan   Plan  Plan: 3-5 times per week  Current Treatment Recommendations: Strengthening,Balance training,Functional mobility training,Transfer training,Endurance training,Neuromuscular re-education,Stair training,Gait training,Pain management,Home exercise program,Safety education & training,Patient/Caregiver education & training,Therapeutic activities  Safety Devices  Type of Devices: Call light within reach,Gait belt,Left in chair,Nurse notified (RN reports no need for chair alarm)     Restrictions  Restrictions/Precautions  Restrictions/Precautions: Bed Alarm,Up as Tolerated,Fall Risk  Position Activity Restriction  Other position/activity restrictions: 2L O2     Subjective   General  Chart Reviewed: Yes  Patient assessed for rehabilitation services?: Yes  Response To Previous Treatment: Not applicable  Family / Caregiver Present: No  Referring Practitioner: Dorothea Reynolds MD  Referral Date : 04/30/22  Diagnosis: COPD exacerbation  Follows Commands: Within Functional Limits  General Comment  Comments: RN cleared pt for PT eval  Subjective  Subjective: Pt sidelying in bed upon arrival, agreeable to PT eval         Social/Functional History  Social/Functional History  Lives With: Alone  Type of Home: Apartment  Home Layout: One level  Home Access: Stairs to enter with rails  Entrance Stairs - Number of Steps: 21  Entrance Stairs - Rails: Both  Bathroom Shower/Tub: Tub/Shower unit  Bathroom Toilet: Standard  Bathroom Equipment:  (not bathroom equipment)  Home Equipment: Oxygen (1.5 L at home)  Has the patient had two or more falls in the past year or any fall with injury in the past year?: No  ADL Assistance: [address] [place] [address] Avenue: Needs assistance  Homemaking Responsibilities: Yes  Ambulation Assistance: Independent  Transfer Assistance: Independent  Active : Yes  Occupation: Retired     Vision/Hearing  Vision Exceptions: Wears glasses for reading  Hearing: Within functional limits      Cognition   Orientation  Overall Orientation Status: Within Functional Limits  Orientation Level: Oriented X4  Cognition  Overall Cognitive Status: WFL     Objective   Pulse: 70  Heart Rate Source: Monitor  BP: 122/76  BP Location: Left upper arm  Patient Position: Sitting  MAP (Calculated): 91.33  Resp: 20  SpO2: 93 %  O2 Device: Nasal cannula        Gross Assessment  AROM: Within functional limits  PROM: Within functional limits  Strength: Generally decreased, functional  Coordination: Within functional limits  Tone: Normal  Sensation: Intact     Bed Mobility Training  Bed Mobility Training: Yes  Overall Level of Assistance: Stand-by assistance  Supine to Sit: Stand-by assistance  Scooting: Stand-by assistance  Balance  Sitting: Intact  Standing: Impaired (2 LOB during ambulation requiring stepping strategy and min(A) to correct)  Standing - Static: Occasional  Standing - Dynamic: Occasional  Transfer Training  Transfer Training: Yes  Overall Level of Assistance: Contact-guard assistance  Sit to Stand: Contact-guard assistance  Stand to Sit: Contact-guard assistance  Gait Training  Gait Training: Yes  Gait  Overall Level of Assistance: Contact-guard assistance;Minimum assistance  Interventions: Verbal cues  Speed/Yadi: Slow  Step Length: Right shortened;Left shortened  Gait Abnormalities: Decreased step clearance  Distance (ft): 40 Feet (40 ft x2 with seated rest break between bouts due to WELCH)  Assistive Device: Gait belt       AM-PAC Score  AM-PAC Inpatient Mobility Raw Score : 19 (05/01/22 1344)  AM-PAC Inpatient T-Scale Score : 45.44 (05/01/22 1344)  Mobility Inpatient CMS 0-100% Score: 41.77 (05/01/22 1344)  Mobility Inpatient CMS G-Code Modifier : CK (05/01/22 1344)          Goals  Long Term Goals  Time Frame for Long term goals : 7 days (5/8/22)  Long term goal 1: Pt will perform bed mobility with IND  Long term goal 2: Pt will perform transfers with IND  Long term goal 3: Pt will ambulate 150 ft with LRAD and supervision  Long term goal 4: Pt will perform 10-12 steps with HR and supervision, demonstrating good energy conservation  Long term goal 5: Pt will perform 12-15 reps of BLE exercises to improve strength by 5/4/22  Patient Goals   Patient goals : \"to go home\"       Therapy Time   Individual Concurrent Group Co-treatment   Time In 1148         Time Out 1221         Minutes 33         Timed Code Treatment Minutes: 23 Minutes (10 min eval)     If pt is unable to be seen after this session, please let this note serve as discharge summary. Please see case management note for discharge disposition. Thank you.     Milan Mcgrath, PT

## 2022-05-01 NOTE — PROGRESS NOTES
Reached out to Case Management [phone] regarding patients discharge planning. Patient very frustrated regarding plan of care upon discharge and would like to speak to someone today. No answer at this time will reattempt at a later time.

## 2022-05-02 LAB
ALBUMIN SERPL-MCNC: 3.5 G/DL (ref 3.4–5)
ANION GAP SERPL CALCULATED.3IONS-SCNC: 7 MMOL/L (ref 3–16)
BLOOD CULTURE, ROUTINE: NORMAL
BUN BLDV-MCNC: 53 MG/DL (ref 7–20)
CALCIUM SERPL-MCNC: 10.1 MG/DL (ref 8.3–10.6)
CHLORIDE BLD-SCNC: 101 MMOL/L (ref 99–110)
CO2: 32 MMOL/L (ref 21–32)
CREAT SERPL-MCNC: 1.4 MG/DL (ref 0.8–1.3)
CULTURE, BLOOD 2: NORMAL
GFR AFRICAN AMERICAN: >60
GFR NON-AFRICAN AMERICAN: 50
GLUCOSE BLD-MCNC: 110 MG/DL (ref 70–99)
HCT VFR BLD CALC: 38.1 % (ref 40.5–52.5)
HEMOGLOBIN: 12.7 G/DL (ref 13.5–17.5)
MCH RBC QN AUTO: 32.4 PG (ref 26–34)
MCHC RBC AUTO-ENTMCNC: 33.4 G/DL (ref 31–36)
MCV RBC AUTO: 97.1 FL (ref 80–100)
PDW BLD-RTO: 13.5 % (ref 12.4–15.4)
PHOSPHORUS: 2.9 MG/DL (ref 2.5–4.9)
PLATELET # BLD: 233 K/UL (ref 135–450)
PMV BLD AUTO: 8.6 FL (ref 5–10.5)
POTASSIUM SERPL-SCNC: 4.9 MMOL/L (ref 3.5–5.1)
RBC # BLD: 3.92 M/UL (ref 4.2–5.9)
SODIUM BLD-SCNC: 140 MMOL/L (ref 136–145)
WBC # BLD: 8.9 K/UL (ref 4–11)

## 2022-05-02 PROCEDURE — 85027 COMPLETE CBC AUTOMATED: CPT

## 2022-05-02 PROCEDURE — 87205 SMEAR GRAM STAIN: CPT

## 2022-05-02 PROCEDURE — 36415 COLL VENOUS BLD VENIPUNCTURE: CPT

## 2022-05-02 PROCEDURE — 87077 CULTURE AEROBIC IDENTIFY: CPT

## 2022-05-02 PROCEDURE — 7100000010 HC PHASE II RECOVERY - FIRST 15 MIN: Performed by: INTERNAL MEDICINE

## 2022-05-02 PROCEDURE — 87070 CULTURE OTHR SPECIMN AEROBIC: CPT

## 2022-05-02 PROCEDURE — 6370000000 HC RX 637 (ALT 250 FOR IP): Performed by: FAMILY MEDICINE

## 2022-05-02 PROCEDURE — 3609011600 HC BRONCHOSCOPY FOREIGN BODY REMOVAL: Performed by: INTERNAL MEDICINE

## 2022-05-02 PROCEDURE — 2709999900 HC NON-CHARGEABLE SUPPLY: Performed by: INTERNAL MEDICINE

## 2022-05-02 PROCEDURE — 1200000000 HC SEMI PRIVATE

## 2022-05-02 PROCEDURE — 2580000003 HC RX 258: Performed by: INTERNAL MEDICINE

## 2022-05-02 PROCEDURE — 2700000000 HC OXYGEN THERAPY PER DAY

## 2022-05-02 PROCEDURE — 2580000003 HC RX 258: Performed by: FAMILY MEDICINE

## 2022-05-02 PROCEDURE — 6360000002 HC RX W HCPCS: Performed by: INTERNAL MEDICINE

## 2022-05-02 PROCEDURE — 6370000000 HC RX 637 (ALT 250 FOR IP): Performed by: INTERNAL MEDICINE

## 2022-05-02 PROCEDURE — 2500000003 HC RX 250 WO HCPCS: Performed by: INTERNAL MEDICINE

## 2022-05-02 PROCEDURE — 99152 MOD SED SAME PHYS/QHP 5/>YRS: CPT | Performed by: INTERNAL MEDICINE

## 2022-05-02 PROCEDURE — 3609027000 HC BRONCHOSCOPY: Performed by: INTERNAL MEDICINE

## 2022-05-02 PROCEDURE — A4217 STERILE WATER/SALINE, 500 ML: HCPCS | Performed by: INTERNAL MEDICINE

## 2022-05-02 PROCEDURE — 99153 MOD SED SAME PHYS/QHP EA: CPT | Performed by: INTERNAL MEDICINE

## 2022-05-02 PROCEDURE — 31645 BRNCHSC W/THER ASPIR 1ST: CPT | Performed by: INTERNAL MEDICINE

## 2022-05-02 PROCEDURE — 94660 CPAP INITIATION&MGMT: CPT

## 2022-05-02 PROCEDURE — 0B9F8ZX DRAINAGE OF RIGHT LOWER LUNG LOBE, VIA NATURAL OR ARTIFICIAL OPENING ENDOSCOPIC, DIAGNOSTIC: ICD-10-PCS | Performed by: INTERNAL MEDICINE

## 2022-05-02 PROCEDURE — 99232 SBSQ HOSP IP/OBS MODERATE 35: CPT | Performed by: INTERNAL MEDICINE

## 2022-05-02 PROCEDURE — 7100000011 HC PHASE II RECOVERY - ADDTL 15 MIN: Performed by: INTERNAL MEDICINE

## 2022-05-02 PROCEDURE — 94761 N-INVAS EAR/PLS OXIMETRY MLT: CPT

## 2022-05-02 PROCEDURE — 80069 RENAL FUNCTION PANEL: CPT

## 2022-05-02 PROCEDURE — 94640 AIRWAY INHALATION TREATMENT: CPT

## 2022-05-02 PROCEDURE — 31624 DX BRONCHOSCOPE/LAVAGE: CPT | Performed by: INTERNAL MEDICINE

## 2022-05-02 RX ORDER — MIDAZOLAM HYDROCHLORIDE 5 MG/ML
INJECTION INTRAMUSCULAR; INTRAVENOUS PRN
Status: DISCONTINUED | OUTPATIENT
Start: 2022-05-02 | End: 2022-05-02 | Stop reason: ALTCHOICE

## 2022-05-02 RX ORDER — SODIUM CHLORIDE, SODIUM LACTATE, POTASSIUM CHLORIDE, CALCIUM CHLORIDE 600; 310; 30; 20 MG/100ML; MG/100ML; MG/100ML; MG/100ML
INJECTION, SOLUTION INTRAVENOUS CONTINUOUS
Status: DISCONTINUED | OUTPATIENT
Start: 2022-05-02 | End: 2022-05-02 | Stop reason: HOSPADM

## 2022-05-02 RX ORDER — FENTANYL CITRATE 50 UG/ML
INJECTION, SOLUTION INTRAMUSCULAR; INTRAVENOUS PRN
Status: DISCONTINUED | OUTPATIENT
Start: 2022-05-02 | End: 2022-05-02 | Stop reason: ALTCHOICE

## 2022-05-02 RX ORDER — DOCUSATE SODIUM 100 MG/1
100 CAPSULE, LIQUID FILLED ORAL 2 TIMES DAILY
Status: DISCONTINUED | OUTPATIENT
Start: 2022-05-02 | End: 2022-05-03 | Stop reason: HOSPADM

## 2022-05-02 RX ORDER — IPRATROPIUM BROMIDE AND ALBUTEROL SULFATE 2.5; .5 MG/3ML; MG/3ML
1 SOLUTION RESPIRATORY (INHALATION) 2 TIMES DAILY
Status: DISCONTINUED | OUTPATIENT
Start: 2022-05-03 | End: 2022-05-03 | Stop reason: HOSPADM

## 2022-05-02 RX ORDER — LIDOCAINE HYDROCHLORIDE 20 MG/ML
INJECTION, SOLUTION EPIDURAL; INFILTRATION; INTRACAUDAL; PERINEURAL PRN
Status: DISCONTINUED | OUTPATIENT
Start: 2022-05-02 | End: 2022-05-02 | Stop reason: ALTCHOICE

## 2022-05-02 RX ORDER — MAGNESIUM HYDROXIDE 1200 MG/15ML
LIQUID ORAL CONTINUOUS PRN
Status: COMPLETED | OUTPATIENT
Start: 2022-05-02 | End: 2022-05-02

## 2022-05-02 RX ORDER — ACETYLCYSTEINE 200 MG/ML
SOLUTION ORAL; RESPIRATORY (INHALATION) PRN
Status: DISCONTINUED | OUTPATIENT
Start: 2022-05-02 | End: 2022-05-02 | Stop reason: ALTCHOICE

## 2022-05-02 RX ADMIN — IPRATROPIUM BROMIDE AND ALBUTEROL SULFATE 1 AMPULE: .5; 3 SOLUTION RESPIRATORY (INHALATION) at 16:05

## 2022-05-02 RX ADMIN — DOCUSATE SODIUM 100 MG: 100 CAPSULE, LIQUID FILLED ORAL at 21:37

## 2022-05-02 RX ADMIN — IPRATROPIUM BROMIDE AND ALBUTEROL SULFATE 1 AMPULE: .5; 3 SOLUTION RESPIRATORY (INHALATION) at 20:30

## 2022-05-02 RX ADMIN — RIVAROXABAN 20 MG: 20 TABLET, FILM COATED ORAL at 18:04

## 2022-05-02 RX ADMIN — IPRATROPIUM BROMIDE AND ALBUTEROL SULFATE 1 AMPULE: .5; 3 SOLUTION RESPIRATORY (INHALATION) at 04:07

## 2022-05-02 RX ADMIN — SODIUM CHLORIDE, PRESERVATIVE FREE 10 ML: 5 INJECTION INTRAVENOUS at 21:56

## 2022-05-02 RX ADMIN — IPRATROPIUM BROMIDE AND ALBUTEROL SULFATE 1 AMPULE: .5; 3 SOLUTION RESPIRATORY (INHALATION) at 11:38

## 2022-05-02 RX ADMIN — IPRATROPIUM BROMIDE AND ALBUTEROL SULFATE 1 AMPULE: .5; 3 SOLUTION RESPIRATORY (INHALATION) at 07:49

## 2022-05-02 RX ADMIN — SODIUM CHLORIDE, POTASSIUM CHLORIDE, SODIUM LACTATE AND CALCIUM CHLORIDE: 600; 310; 30; 20 INJECTION, SOLUTION INTRAVENOUS at 12:23

## 2022-05-02 RX ADMIN — METOPROLOL TARTRATE 12.5 MG: 25 TABLET, FILM COATED ORAL at 21:38

## 2022-05-02 RX ADMIN — ATORVASTATIN CALCIUM 80 MG: 80 TABLET, FILM COATED ORAL at 21:37

## 2022-05-02 RX ADMIN — DOXYCYCLINE HYCLATE 100 MG: 100 TABLET, COATED ORAL at 21:37

## 2022-05-02 RX ADMIN — PREDNISONE 40 MG: 20 TABLET ORAL at 08:32

## 2022-05-02 ASSESSMENT — PAIN SCALES - GENERAL
PAINLEVEL_OUTOF10: 0

## 2022-05-02 NOTE — PROGRESS NOTES
Physical Therapy/Occupational Therapy    Pt off floor for procedure this afternoon, will try again at later date.     [email or web address], PTA#[id number]  Jovanny \"Sven\" Laura Skinner, OTR/L

## 2022-05-02 NOTE — PROGRESS NOTES
Hospitalist Progress Note      PCP: Kenzie Marquez MD    Date of Admission: 4/28/2022    Chief Complaint: SOB and Cough    Subjective: no new c/o. Medications:  Reviewed    Infusion Medications    sodium chloride       Scheduled Medications    atorvastatin  80 mg Oral Nightly    metoprolol tartrate  12.5 mg Oral BID    rivaroxaban  20 mg Oral Dinner    sodium chloride flush  5-40 mL IntraVENous 2 times per day    predniSONE  40 mg Oral Daily    doxycycline hyclate  100 mg Oral 2 times per day    pantoprazole  40 mg Oral QAM AC    ipratropium-albuterol  1 ampule Inhalation Q4H     PRN Meds: sodium chloride, guaiFENesin-codeine, benzonatate, sodium chloride flush, sodium chloride, ondansetron **OR** ondansetron, polyethylene glycol, acetaminophen **OR** acetaminophen      Intake/Output Summary (Last 24 hours) at 5/2/2022 0834  Last data filed at 5/1/2022 2048  Gross per 24 hour   Intake 490 ml   Output --   Net 490 ml       Physical Exam Performed:    /76   Pulse 70   Temp 98.3 °F (36.8 °C) (Oral)   Resp 16   Ht 6' 5\" (1.956 m)   Wt 210 lb (95.3 kg)   SpO2 94%   BMI 24.90 kg/m²     General appearance: No apparent distress, appears stated age and cooperative. HEENT: Pupils equal, round, and reactive to light. Conjunctivae/corneas clear. Neck: Supple, with full range of motion. No jugular venous distention. Trachea midline. Respiratory:  Normal respiratory effort. Clear to auscultation, bilaterally without Rales/Wheezes/Rhonchi. Cardiovascular: Regular rate and rhythm with normal S1/S2 without murmurs, rubs or gallops. Abdomen: Soft, non-tender, non-distended with normal bowel sounds. Musculoskeletal: No clubbing, cyanosis or edema bilaterally. Full range of motion without deformity. Skin: Skin color, texture, turgor normal.  No rashes or lesions. Neurologic:  Neurovascularly intact without any focal sensory/motor deficits.  Cranial nerves: II-XII intact, grossly non-focal.  Psychiatric: Alert and oriented, thought content appropriate, normal insight  Capillary Refill: Brisk,< 3 seconds   Peripheral Pulses: +2 palpable, equal bilaterally       Labs:   Recent Labs     04/30/22  0552 05/01/22  0624 05/02/22  0640   WBC 14.4* 12.7* 8.9   HGB 12.9* 12.8* 12.7*   HCT 39.5* 38.7* 38.1*    241 233     Recent Labs     04/30/22  0552 05/01/22  0624 05/02/22  0640    143 140   K 4.9 4.7 4.9    103 101   CO2 29 34* 32   BUN 45* 54* 53*   CREATININE 1.5* 1.4* 1.4*   CALCIUM 10.4 10.2 10.1   PHOS 3.0 3.2 2.9     No results for input(s): AST, ALT, BILIDIR, BILITOT, ALKPHOS in the last 72 hours. No results for input(s): INR in the last 72 hours. No results for input(s): Dale Hug in the last 72 hours. Urinalysis:      Lab Results   Component Value Date    NITRU Negative 04/14/2021    WBCUA 3-5 04/14/2021    BACTERIA Rare 04/14/2021    RBCUA 3-4 04/14/2021    BLOODU Negative 04/14/2021    SPECGRAV >=1.030 04/14/2021    GLUCOSEU Negative 04/14/2021    GLUCOSEU NEGATIVE 03/27/2012       Consults:    IP CONSULT TO PULMONOLOGY      Assessment/Plan:    Active Hospital Problems    Diagnosis     Chest congestion [R09.89]      Priority: Medium    Mucus plugging of bronchi [T17.500A]      Priority: Medium    Ineffective airway clearance [R06.89]      Priority: Medium    CKD (chronic kidney disease) [N18.9]      Priority: Medium    COPD with acute exacerbation ([facility] [address] [J44.1]      Priority: Medium    Acute on chronic respiratory failure (HCC) [J96.20]      Priority: Medium    Essential hypertension [I10]        COPD - w/ chronic respiratory failure on baseline home O2 at 1-2 LPM.  Normally controlled on home medication regimen - continued. Acute COPD exacerbation in setting of chronic respiratory failure - base line O2 1-2L. Started IV Solumedrol with transition to PO Prednisone 1 May. Doxycycline started 28 April - continued. Continue HHN.  Pulmonology consulted and appreciated - NPO for possible Bronchoscopy 2 May.      Acute on Chronic Respiratory Failure - w/ progressive hypoxia, POArrival.  Presence of clinical respiratory distress w/ tachypnea/dyspnea/SOB and wheezing w/ use of accessory muscles to breath. Supplemental O2 and wean as tolerated. Nocturnal Bipap as ordered by Pulmonology - consulted and appreciated. Hx PE - anticoagulated at baseline on home Xarelto - continued.     Tobacco Abuse - active and ongoing. Cessation counseled. Nicotine replacement PRN. CKD - baseline stage 3. Follow serial labs - stable. Reviewed and documented as above.       DVT Prophylaxis: Xarelto     Recent Labs     04/30/22  0552 05/01/22  0624 05/02/22  0640    241 233     Diet: Diet NPO  Code Status: Full Code      PT/OT Eval Status: seen w/ recs for home w/ assist.     Dispo - Patient is likely to remain in-house at least until Tues/Wed 3/4 May pending clinical course and subspecialty recs - [facility] through [place] if needed. [employer].        Ivan Ward MD

## 2022-05-02 NOTE — PROGRESS NOTES
INPATIENT PULMONARY CRITICAL CARE PROGRESS NOTE      Reason for visit    COPD exacerbation     SUBJECTIVE: Patient when seen this morning was continued to have chest congestion, patient is not able to expectorate and think significant, patient continues to have some shortness of breath and continues to be symptomatic, patient was afebrile and hemodynamically maintained, patient was on 2 L of nasal oxygen with saturation of 93%, patient had sinus rhythm on the monitor, patient  patient's glycemic control was acceptable,, patient was alert and communicative, no other pertinent review of system of concern        Physical Exam:  Blood pressure 133/78, pulse 99, temperature 97.4 °F (36.3 °C), temperature source Oral, resp. rate 22, height 6' 5\" (1.956 m), weight 210 lb (95.3 kg), SpO2 91 %.'     Constitutional:  In no significant respiratory distress when seen this morning, patient does have some increased chest congestion with audible congestion,  HENT:  Oropharynx is clear and moist. No thyromegaly. Eyes:  Conjunctivae are normal. Pupils equal, round, and reactive to light. No scleral icterus. Neck: . No tracheal deviation present. No obvious thyroid mass. Cardiovascular: Sinus t rhythm, normal heart sounds. No right ventricular heave. No lower extremity edema. Pulmonary/Chest: Bilateral decreased wheezes. Scattered rales. Increased chest congestion Chest wall is not dull to percussion. No accessory muscle usage or stridor. Decreased breath sound intensity, prolonged expiration  Abdominal: Soft. Bowel sounds present. No distension or hernia. No tenderness. Musculoskeletal: No cyanosis. No clubbing. No obvious joint deformity. Lymphadenopathy: No cervical or supraclavicular adenopathy. Skin: Skin is warm and dry. No rash or nodules on the exposed extremities. Psychiatric: Normal mood and affect. Behavior is normal.  No anxiety. Neurologic: Alert, awake and oriented. PERRL.   Speech fluent     Results:  CBC:   Recent Labs     04/30/22  0552 05/01/22  0624 05/02/22  0640   WBC 14.4* 12.7* 8.9   HGB 12.9* 12.8* 12.7*   HCT 39.5* 38.7* 38.1*   MCV 98.2 97.6 97.1    241 233     BMP:   Recent Labs     04/30/22  0552 05/01/22  0624 05/02/22  0640    143 140   K 4.9 4.7 4.9    103 101   CO2 29 34* 32   PHOS 3.0 3.2 2.9   BUN 45* 54* 53*   CREATININE 1.5* 1.4* 1.4*       Imaging:  I have reviewed radiology images personally. XR CHEST PORTABLE   Final Result   Increased lung volumes, no acute process detected. XR CHEST PORTABLE    Result Date: 4/28/2022  EXAMINATION: ONE XRAY VIEW OF THE CHEST 4/28/2022 12:15 pm COMPARISON: 02/10/2022 HISTORY: ORDERING SYSTEM PROVIDED HISTORY: shortness of breath TECHNOLOGIST PROVIDED HISTORY: Reason for exam:->shortness of breath Reason for Exam: sob FINDINGS: Lung volumes are increased compatible with COPD/emphysema. Thoracic aorta is tortuous. Heart is normal in size. No pleural fluid or pneumonia. Unchanged apical scarring. Osseous structures are stable. Increased lung volumes, no acute process detected. Assessment:  Principal Problem:    COPD with acute exacerbation (HCC)  Active Problems:    Acute on chronic respiratory failure (HCC)    CKD (chronic kidney disease)    Chest congestion    Mucus plugging of bronchi    Ineffective airway clearance    Essential hypertension  Resolved Problems:    * No resolved hospital problems.  *          Plan:   · Oxygen supplementation to keep oxygen saturation between 90 and 94% only  · Please titrate the oxygen as per the above parameters  · BIPAP on intermittent basis   · Monitor for any worsening hypoventilation and hypercarbia  · Patient has been started on p.o. doxycycline which can be continued  · Bronchodilators in the form of DuoNeb to continue  · IV Solu-Medrol has been changed to PO prednsione   · Patient has increasing chest congestion with mucus plugging with ineffective airway clearance which is not improving with conservative management  · Patient may benefit from a therapeutic bronchoscopy-patient was told about the procedure along with the pros and cons and patient wanted to proceed with that-patient will be kept n.p.o. after midnight for the procedure for today   · Smoking cessation advised  · Nicotine replacement therapy if the patient wants  · Cardiac medications including anticoagulation as per IM  · Monitor input output and BMP  · Correct electrolytes on whenever necessary basis  · PUD prophylaxis     Further management depending on patient's clinical status and the bronchoscopy findings        Electronically signed by:  Keo Merritt MD    5/2/2022    6:31 PM.

## 2022-05-02 NOTE — PROGRESS NOTES
05/02/22 0403   NIV Type   Skin Protection for O2 Device Yes   Location Nose   Equipment Type v60   Mode Bilevel   Mask Type Full face mask   Mask Size Large   Settings/Measurements   IPAP 15 cmH20   CPAP/EPAP 5 cmH2O   Rate Ordered 10   Resp 12   FiO2  30 %   I Time/ I Time % 1 s   Vt Exhaled 519 mL   Minute Volume 526 Liters   Mask Leak (lpm) 52 lpm   Comfort Level Good   Using Accessory Muscles No   SpO2 97   Alarm Settings   Alarms On Y   Low Pressure 6 cmH2O   High Pressure  30 cmH2O   Apnea (secs) 20 secs   RR Low  6   RR High 40 br/min   Oxygen Therapy/Pulse Ox   SpO2 97 %

## 2022-05-02 NOTE — PRE SEDATION
Sedation Pre-Procedure Note    Patient Name: Ankita Franklin   YOB: 1950  Room/Bed: 1280/6732-42  Medical Record Number: [id number]  Date: 5/2/2022   Time: 7:32 PM       Indication: Respiratory distress, COPD exacerbation, pulm infiltrates, persistent chest congestion, mucous plugging, ineffective airway clearance    Consent: I have discussed with the patient and/or the patient representative the indication, alternatives, and the possible risks and/or complications of the planned procedure and the anesthesia methods. The patient and/or patient representative appear to understand and agree to proceed. Vital Signs:   Vitals:    05/02/22 1603   BP: 133/78   Pulse: 99   Resp: 22   Temp: 97.4 °F (36.3 °C)   SpO2: 91%       Past Medical History:   has a past medical history of Abdominal Pain, Anxiety State, BPH (benign prostatic hyperplasia), Cancer ([address] Chest pain, Colon polyp, DVT, lower extremity ([address] Hernia, HYPERTENSION, PE (pulmonary embolism), Prostatitis, Hx of, and SBO (small bowel obstruction) ([address] Past Surgical History:   has a past surgical history that includes hernia repair; Prostate biopsy; Tonsillectomy; other surgical history (1/16/2014); and Inguinal hernia repair (Right, 1/23/14).     Medications:   Scheduled Meds:    docusate sodium  100 mg Oral BID    atorvastatin  80 mg Oral Nightly    metoprolol tartrate  12.5 mg Oral BID    rivaroxaban  20 mg Oral Dinner    sodium chloride flush  5-40 mL IntraVENous 2 times per day    predniSONE  40 mg Oral Daily    doxycycline hyclate  100 mg Oral 2 times per day    pantoprazole  40 mg Oral QAM AC    ipratropium-albuterol  1 ampule Inhalation Q4H     Continuous Infusions:    sodium chloride       PRN Meds: sodium chloride, guaiFENesin-codeine, benzonatate, sodium chloride flush, sodium chloride, ondansetron **OR** ondansetron, polyethylene glycol, acetaminophen **OR** acetaminophen  Home Meds:   Prior to Admission medications Medication Sig Start Date End Date Taking? Authorizing Provider   aspirin EC 81 MG EC tablet Take 81 mg by mouth daily   Yes Historical Provider, MD   fluticasone (FLONASE) 50 MCG/ACT nasal spray 2 sprays by Each Nostril route daily   Yes Historical Provider, MD   tiotropium-olodaterol (STIOLTO) 2.5-2.5 MCG/ACT AERS Inhale 2 puffs into the lungs daily   Yes Historical Provider, MD   albuterol sulfate (PROAIR RESPICLICK) 135 (90 Base) MCG/ACT aerosol powder inhalation Inhale 2 puffs into the lungs every 4 hours as needed for Wheezing or Shortness of Breath    Historical Provider, MD   rivaroxaban (XARELTO) 20 MG TABS tablet Take 20 mg by mouth Daily with supper    Historical Provider, MD   metoprolol tartrate (LOPRESSOR) 25 MG tablet Take 12.5 mg by mouth 2 times daily Take 12.5 mg BID    Historical Provider, MD   atorvastatin (LIPITOR) 80 MG tablet Take 80 mg by mouth nightly    Historical Provider, MD   Compression Stockings MISC by Does not apply route. 10/13/14   Guido Harkins MD   Compression Stockings MISC by Does not apply route. 2/11/14   Guido Harkins MD           Pre-Sedation Documentation and Exam:   Vital signs have been reviewed (see flow sheet for vitals).     Mallampati Airway Assessment:  Mallampati Class III - (soft palate & base of uvula are visible)    Prior History of Anesthesia Complications:   none    ASA Classification:  Class 3 - A patient with severe systemic disease that limits activity but is not incapacitating    Sedation/ Anesthesia Plan:   intravenous sedation    Medications Planned:   midazolam (Versed) intravenously and fentanyl intravenously    Patient is an appropriate candidate for plan of sedation: yes    Electronically signed by June Bowen MD on 5/2/2022 at 7:32 PM

## 2022-05-02 NOTE — CARE COORDINATION
CASE MANAGEMENT INITIAL ASSESSMENT      Reviewed chart and completed assessment with patient: Pt  Family present:  None   Explained Case Management role/services. Yes     Primary contact information: daughter Jacob Beverage :   Primary Decision Maker: Katie Egan - Child - [phone]    Secondary Decision Maker: Dixie Caldwell Child - [phone]        Admit date/status: 4/28/22   Diagnosis: COPD   Is this a Readmission?: No    Insurance: VA/ Medicare   Precert required for SNF:  VA approval/ or No if Medicare         3 night stay required: N currently waived     Living arrangements, Adls, care needs, prior to admission: Lives at home alone independent of ADL'S     Durable Medical Equipment at home:  Walker__Cane__RTS__ BSC__Shower Chair__  02_x [facility] 1.5 LNC_ HHN__ CPAP__  BiPap__  Hospital Bed__ W/C___ Other_____    Services in the home and/or outpatient, prior to admission: None     Current PCP: Dr. Jacque Nuñez             Transportation needs:  Family will transport     Dialysis Facility (if applicable) N/A   · Name:  · Address:  · Dialysis Schedule:  · Phone:  · Fax:    PT/OT recs: [facility] Exemption Notification (HEN): Needed for SNF     Barriers to discharge: None     Plan/comments: Pt interested in [address] would like to use VA insurance. Will need discharging  MD to sign [facility] and write home care order on d/c. Will fax all that over to [facility] when completed, call placed to Asher Harkins at [facility] [place] with Dr. Jacque Nuñez and I also called pt's daughter Todd Tellez to update her. Left a  for both. Will follow.        ECOC on chart for MD signature

## 2022-05-02 NOTE — PROGRESS NOTES
Report received from [place]. Pt with non productive congested cough.   Pt responsive to voice

## 2022-05-02 NOTE — PROGRESS NOTES
Pt back from bronchoscopy. Pt alert and oriented. 02 at 5L NC. Denies any pain. VSS. Talked with José Antonio English with Dr Nicky Castorena and it is ok to take xarelto tonight.

## 2022-05-02 NOTE — PROCEDURES
Bronchoscopy note    Patient with respiratory distress, COPD exacerbation, pulm infiltrates, persistent chest congestion, mucous plugging, ineffective airway clearance which was not improving with conservative management and given the patient clinical status was decided to do a bronchoscopy for diagnostic and therapeutic purposes and for that reason after informed consent, the patient was taken to the endoscopy suite, patient was given oropharyngeal analgesia with benzocaine after timeout, patient was given lidocaine for tracheobronchial analgesia, patient was given conscious sedation with 7 mg of Versed and 125 mcg of fentanyl, the details of the sedation are as following-    Physician/patient of face-to-face sedation start time was 1:48 PM  Physician/patient face-to-face sedation stop time was 2:24 PM  Total moderate sedation time in minutes was 36 minutes  The patient was monitored continuously  throughout the entire procedure while the sedation was being administered    The bronchoscope was introduced the mouth using a bite block, patient was found to have some secretions in the oropharynx which was suctioned out, patient's vocal cord was normal, patient was found to have a pea lodged into the left mainstem bronchus along with that patient had extensive thick mucous plugs which were quite viscous and tenacious and difficult to suction out, the bronchoscope was wedged into the right lower lobe bronchus and BAL was sent from the area which was sent for discussion cytology, the mucus plugging was therapeutically aspirated out using Mucomyst and saline with improvement in the patency of the airways, also the pea which was was suctioned out using bronchoscope and forceps with clearing up of the tracheobronchial tree, patient also had some erythema of the tracheobronchial epithelial lining  Patient tolerated the procedure well and did not have any apparent complications  Estimated blood loss was less than 3 Sukumar    Further management depending on patient's clinical status and the bronchoscopy results    Steve Vergara MD

## 2022-05-02 NOTE — PROGRESS NOTES
05/02/22 0001   NIV Type   $NIV $Daily Charge   Skin Protection for O2 Device Yes   Location Nose   Equipment Type v60   Mode Bilevel   Mask Type Full face mask   Mask Size Large   Settings/Measurements   IPAP 15 cmH20   CPAP/EPAP 5 cmH2O   Rate Ordered 10   Resp 22   FiO2  30 %   I Time/ I Time % 1 s   Vt Exhaled 656 mL   Minute Volume 15.3 Liters   Mask Leak (lpm) 49 lpm   Comfort Level Good   Using Accessory Muscles No   SpO2 96  (pt placed on pulse on in room/RN notiifed)   Alarm Settings   Alarms On Y   Low Pressure 6 cmH2O   High Pressure  30 cmH2O   Apnea (secs) 20 secs   RR Low  6   RR High 40 br/min   Oxygen Therapy/Pulse Ox   SpO2 96 %

## 2022-05-02 NOTE — PLAN OF CARE
Problem: Discharge Planning  Goal: Discharge to home or other facility with appropriate resources  Outcome: Progressing     Problem: Pain  Goal: Verbalizes/displays adequate comfort level or baseline comfort level  Outcome: Progressing     Problem: Safety - Adult  Goal: Free from fall injury  Outcome: Progressing     Problem: ABCDS Injury Assessment  Goal: Absence of physical injury  Outcome: Progressing     Problem: Nutrition Deficit:  Goal: Optimize nutritional status  Outcome: Progressing

## 2022-05-02 NOTE — PROGRESS NOTES
05/01/22 2344   NIV Type   Skin Assessment Clean, dry, & intact   Skin Protection for O2 Device Yes   Location Nose   NIV Started/Stopped On   Equipment Type v60   Mode Bilevel   Mask Type Full face mask   Mask Size Large   Settings/Measurements   IPAP 15 cmH20   CPAP/EPAP 5 cmH2O   Rate Ordered 10   Resp 17   FiO2  30 %   I Time/ I Time % 1 s   Vt Exhaled 832 mL   Minute Volume 14.3 Liters   Mask Leak (lpm) 46 lpm   Comfort Level Good   Using Accessory Muscles No   SpO2 95   Breath Sounds   Right Upper Lobe Diminished   Right Middle Lobe Diminished   Right Lower Lobe Diminished   Left Upper Lobe Diminished   Left Lower Lobe Diminished   Alarm Settings   Alarms On Y   Low Pressure 6 cmH2O   High Pressure  30 cmH2O   Apnea (secs) 20 secs   RR Low  6   RR High 40 br/min

## 2022-05-03 ENCOUNTER — APPOINTMENT (OUTPATIENT)
Dept: GENERAL RADIOLOGY | Age: 72
DRG: 190 | End: 2022-05-03
Payer: OTHER GOVERNMENT

## 2022-05-03 VITALS
BODY MASS INDEX: 24.79 KG/M2 | OXYGEN SATURATION: 96 % | RESPIRATION RATE: 18 BRPM | WEIGHT: 210 LBS | TEMPERATURE: 98.2 F | HEART RATE: 59 BPM | SYSTOLIC BLOOD PRESSURE: 130 MMHG | DIASTOLIC BLOOD PRESSURE: 76 MMHG | HEIGHT: 77 IN

## 2022-05-03 LAB
ALBUMIN SERPL-MCNC: 3.4 G/DL (ref 3.4–5)
ANION GAP SERPL CALCULATED.3IONS-SCNC: 7 MMOL/L (ref 3–16)
BUN BLDV-MCNC: 43 MG/DL (ref 7–20)
CALCIUM SERPL-MCNC: 9.8 MG/DL (ref 8.3–10.6)
CHLORIDE BLD-SCNC: 100 MMOL/L (ref 99–110)
CO2: 33 MMOL/L (ref 21–32)
CREAT SERPL-MCNC: 1.2 MG/DL (ref 0.8–1.3)
GFR AFRICAN AMERICAN: >60
GFR NON-AFRICAN AMERICAN: 60
GLUCOSE BLD-MCNC: 96 MG/DL (ref 70–99)
HCT VFR BLD CALC: 37.5 % (ref 40.5–52.5)
HEMOGLOBIN: 12.4 G/DL (ref 13.5–17.5)
MCH RBC QN AUTO: 31.8 PG (ref 26–34)
MCHC RBC AUTO-ENTMCNC: 33.1 G/DL (ref 31–36)
MCV RBC AUTO: 96 FL (ref 80–100)
PDW BLD-RTO: 13.6 % (ref 12.4–15.4)
PHOSPHORUS: 2.7 MG/DL (ref 2.5–4.9)
PLATELET # BLD: 224 K/UL (ref 135–450)
PMV BLD AUTO: 9 FL (ref 5–10.5)
POTASSIUM SERPL-SCNC: 4.3 MMOL/L (ref 3.5–5.1)
RBC # BLD: 3.9 M/UL (ref 4.2–5.9)
SODIUM BLD-SCNC: 140 MMOL/L (ref 136–145)
WBC # BLD: 9.3 K/UL (ref 4–11)

## 2022-05-03 PROCEDURE — 94761 N-INVAS EAR/PLS OXIMETRY MLT: CPT

## 2022-05-03 PROCEDURE — 99232 SBSQ HOSP IP/OBS MODERATE 35: CPT | Performed by: INTERNAL MEDICINE

## 2022-05-03 PROCEDURE — 94660 CPAP INITIATION&MGMT: CPT

## 2022-05-03 PROCEDURE — 6370000000 HC RX 637 (ALT 250 FOR IP): Performed by: INTERNAL MEDICINE

## 2022-05-03 PROCEDURE — 2580000003 HC RX 258: Performed by: FAMILY MEDICINE

## 2022-05-03 PROCEDURE — 92526 ORAL FUNCTION THERAPY: CPT

## 2022-05-03 PROCEDURE — 74230 X-RAY XM SWLNG FUNCJ C+: CPT

## 2022-05-03 PROCEDURE — 94640 AIRWAY INHALATION TREATMENT: CPT

## 2022-05-03 PROCEDURE — 80069 RENAL FUNCTION PANEL: CPT

## 2022-05-03 PROCEDURE — 92611 MOTION FLUOROSCOPY/SWALLOW: CPT

## 2022-05-03 PROCEDURE — 97116 GAIT TRAINING THERAPY: CPT

## 2022-05-03 PROCEDURE — 2700000000 HC OXYGEN THERAPY PER DAY

## 2022-05-03 PROCEDURE — 97110 THERAPEUTIC EXERCISES: CPT

## 2022-05-03 PROCEDURE — 6370000000 HC RX 637 (ALT 250 FOR IP): Performed by: FAMILY MEDICINE

## 2022-05-03 PROCEDURE — 36415 COLL VENOUS BLD VENIPUNCTURE: CPT

## 2022-05-03 PROCEDURE — 85027 COMPLETE CBC AUTOMATED: CPT

## 2022-05-03 RX ORDER — PREDNISONE 20 MG/1
40 TABLET ORAL DAILY
Qty: 6 TABLET | Refills: 0 | Status: SHIPPED | OUTPATIENT
Start: 2022-05-04 | End: 2022-05-07

## 2022-05-03 RX ORDER — BENZONATATE 100 MG/1
100 CAPSULE ORAL 3 TIMES DAILY PRN
Qty: 20 CAPSULE | Refills: 0 | Status: SHIPPED | OUTPATIENT
Start: 2022-05-03 | End: 2022-05-10

## 2022-05-03 RX ORDER — PANTOPRAZOLE SODIUM 40 MG/1
40 TABLET, DELAYED RELEASE ORAL
Qty: 30 TABLET | Refills: 3 | Status: SHIPPED | OUTPATIENT
Start: 2022-05-04 | End: 2022-06-03

## 2022-05-03 RX ADMIN — SODIUM CHLORIDE, PRESERVATIVE FREE 10 ML: 5 INJECTION INTRAVENOUS at 09:11

## 2022-05-03 RX ADMIN — METOPROLOL TARTRATE 12.5 MG: 25 TABLET, FILM COATED ORAL at 09:11

## 2022-05-03 RX ADMIN — PANTOPRAZOLE SODIUM 40 MG: 40 TABLET, DELAYED RELEASE ORAL at 09:11

## 2022-05-03 RX ADMIN — DOCUSATE SODIUM 100 MG: 100 CAPSULE, LIQUID FILLED ORAL at 09:11

## 2022-05-03 RX ADMIN — PREDNISONE 40 MG: 20 TABLET ORAL at 09:11

## 2022-05-03 RX ADMIN — DOXYCYCLINE HYCLATE 100 MG: 100 TABLET, COATED ORAL at 09:11

## 2022-05-03 RX ADMIN — IPRATROPIUM BROMIDE AND ALBUTEROL SULFATE 1 AMPULE: .5; 3 SOLUTION RESPIRATORY (INHALATION) at 08:27

## 2022-05-03 ASSESSMENT — PAIN SCALES - GENERAL
PAINLEVEL_OUTOF10: 0
PAINLEVEL_OUTOF10: 0

## 2022-05-03 NOTE — PROGRESS NOTES
Pt's IV line removed without complications. Discussed d/c instructions with patient, given opportunity to ask questions, and provided new medication education with side effects. Follow up appointment information included in d/c instructions. Pt verbalized understanding of d/c instructions. Patient was discharged to home with all belongings and taken outside via wheelchair.     Whitley Faye RN

## 2022-05-03 NOTE — PROGRESS NOTES
05/03/22 0023   NIV Type   $NIV $Daily Charge   Skin Protection for O2 Device Yes   Location Nose   Equipment Type v60   Mode Bilevel   Mask Type Full face mask   Mask Size Large   Settings/Measurements   IPAP 15 cmH20   CPAP/EPAP 5 cmH2O   Rate Ordered 10   Resp 16   FiO2  30 %   I Time/ I Time % 1 s   Vt Exhaled 634 mL   Minute Volume 9.2 Liters   Comfort Level Good   Using Accessory Muscles No   SpO2 95   Alarm Settings   Alarms On Y   Low Pressure 6 cmH2O   High Pressure  30 cmH2O   Apnea (secs) 20 secs   RR Low  6   RR High 40 br/min   Oxygen Therapy/Pulse Ox   SpO2 96 %

## 2022-05-03 NOTE — PLAN OF CARE
Aspiration and Dysphagia    Patient educated on role of ST. Please see ST evaluation and treatment notes for specific information re: plan of care, interventions provided and progress toward goals.

## 2022-05-03 NOTE — CARE COORDINATION
CASE MANAGEMENT DISCHARGE SUMMARY      Discharge to:     Precertification completed:   Hospital Exemption Notification (HENS) completed: IMM given: (date)     [address] ordered/agency:     Transportation:    Family/car:   Medical Transport explained to [employer]. Pt/family voice no agency preference. Agency used:   time:   Ambulance form completed: Yes    Confirmed discharge plan with:     Patient: yes/no     Family:  yes/no    Name: Contact number:     Facility/Agency, name:  [facility] faxed   Phone number for report to facility:      RN, name:     Note: Discharging nurse to complete FIDELIA, reconcile AVS, and place final copy with patient's discharge packet. RN to ensure that written prescriptions for  Level II medications are sent with patient to the facility as per protocol. CASE MANAGEMENT DISCHARGE SUMMARY      Discharge to:     Precertification completed:   Hospital Exemption Notification (HENS) completed: IMM given: (date)     [address] ordered/agency:     Transportation:    Family/car:   Medical Transport explained to [employer]. Pt/family voice no agency preference. Agency used:   time:   Ambulance form completed: Yes    Confirmed discharge plan with:     Patient: yes/no     Family:  yes/no    Name: Contact number:     Facility/Agency, name:  [facility] faxed   Phone number for report to facility:      RN, name:     Note: Discharging nurse to complete FIDELIA, reconcile AVS, and place final copy with patient's discharge packet. RN to ensure that written prescriptions for  Level II medications are sent with patient to the facility as per protocol. CASE MANAGEMENT DISCHARGE SUMMARY      Discharge to:     Precertification completed:   Hospital Exemption Notification (HENS) completed: IMM given: (date)     [address] ordered/agency:     Transportation:    Family/car:   Medical Transport explained to [employer]. Pt/family voice no agency preference. Agency used:   time:   Ambulance form completed: Yes    Confirmed discharge plan with:     Patient: yes/no     Family:  yes/no    Name: Contact number:     Facility/Agency, name:  [facility] faxed   Phone number for report to facility:      RN, name:     Note: Discharging nurse to complete FIDELIA, reconcile AVS, and place final copy with patient's discharge packet. RN to ensure that written prescriptions for  Level II medications are sent with patient to the facility as per protocol. CASE MANAGEMENT DISCHARGE SUMMARY      Discharge to: Home with [facility]. SN, PT and OT. IMM given: admission    New Durable Medical Equipment ordered/agency: None    Transportation: Daughter  Confirmed discharge plan with: Met with pt at bedside, daughter will drive home     Patient: yes     Facility/Agency, name:  [facility] faxed- to Dr. Suzanne Jade PCP, [facility] with [place] pass with updates. Spoke to Denton Saxena with [facility].      RN, name: Felipa Craig RN

## 2022-05-03 NOTE — PROGRESS NOTES
05/03/22 0415   NIV Type   Skin Protection for O2 Device Yes   Location Nose   Equipment Type v60   Mode Bilevel   Mask Type Full face mask   Mask Size Large   Settings/Measurements   IPAP 15 cmH20   CPAP/EPAP 5 cmH2O   Rate Ordered 10   Resp 17   FiO2  30 %   I Time/ I Time % 1 s   Vt Exhaled 975 mL   Minute Volume 13.1 Liters   Mask Leak (lpm) 35 lpm   Comfort Level Good   Using Accessory Muscles No   SpO2 95   Alarm Settings   Alarms On Y   Low Pressure 6 cmH2O   High Pressure  30 cmH2O   Apnea (secs) 20 secs   RR Low  6   RR High 40 br/min

## 2022-05-03 NOTE — CARE COORDINATION
[facility] unable to staff  Referral accepted by [facility] pending va auth  Yousuf Whitfield will contact [place] for auth request    Patient has no preference in agency    [address] [facility]  [facility] [phone]

## 2022-05-03 NOTE — PROGRESS NOTES
Physical Therapy  Facility/Department: [facility] C5 - MED SURG/ORTHO  Daily Treatment Note  NAME: Juno Telles  : 1950  MRN: [id number]    Date of Service: 5/3/2022    Discharge Recommendations:  Home with Home health PT,24 hour supervision or assist        Patient Diagnosis(es): The primary encounter diagnosis was COPD exacerbation ([address] A diagnosis of Acute respiratory failure with hypoxia (HCC) was also pertinent to this visit. Assessment   Assessment: Pt presents to [facility] with COPD exacerbation. PTA, pt independent with functional mobility and has 3 flights of steps into apartment. Daughter available to help as needed. Currently, pt functioning below baseline, requires CGA-min(A) for ambulation and had 1 notable LOB requiring assist. Pt would benefit from continued skilled PT to address deficits. Recommend home with HHPT upon d/c  Activity Tolerance: Patient limited by endurance     Plan    Plan  Plan: 3-5 times per week  Current Treatment Recommendations: Strengthening;Balance training;Functional mobility training;Transfer training; Endurance training;Neuromuscular re-education;Stair training;Gait training;Pain management;Home exercise program;Safety education & training;Patient/Caregiver education & training; Therapeutic activities     Restrictions  Restrictions/Precautions  Restrictions/Precautions: Bed Alarm,Up as Tolerated,Fall Risk  Position Activity Restriction  Other position/activity restrictions: 2L O2     Subjective    Subjective  Subjective: Pt agrees to PT session  Pain: No pain c/o  Orientation  Overall Orientation Status: Within Functional Limits     Objective   Vitals  Pulse: 74  BP: 118/72  BP Location: Right upper arm  MAP (Calculated): 87.33  SpO2: 93 % (02 sats down to 87% after activity-90\" to recover)  O2 Device: Nasal cannula (2L)  Bed Mobility Training  Supine to Sit: Modified independent  Scooting: Modified independent  Balance  Sitting: Intact  Standing: Impaired (1 slight  LOB during ambulation requiring stepping strategy and min(A) to correct)  Standing - Static: Occasional  Standing - Dynamic: Occasional  Transfer Training  Transfer Training: Yes  Overall Level of Assistance: Stand-by assistance  Sit to Stand: Stand-by assistance  Stand to Sit: Stand-by assistance  Gait Training  Gait Training: Yes  Gait  Overall Level of Assistance: Contact-guard assistance;Minimum assistance  Interventions: Verbal cues  Speed/Yadi: Slow  Step Length: Right shortened;Left shortened  Gait Abnormalities: Decreased step clearance  Distance (ft): 60 Feet  Assistive Device: Gait belt (pt declined use of rw.  Educated on purpose: to trial safety w/ RW)  Rail Use: Right (ascent)  Stairs - Level of Assistance: Stand-by assistance  Number of Stairs Trained: 7 (7 up/down= total 14)     PT Exercises  Exercise Treatment: performed     Safety Devices  Type of Devices: Call light within reach;Gait belt;Left in chair;Nurse notified     Patient Education  Education Given To: Patient  Education Provided: Home Exercise Program;Transfer Training;Energy Conservation  Education Method: Demonstration;Verbal  Education Outcome: Verbalized understanding    Goals  Long Term Goals  Time Frame for Long term goals : 7 days (5/8/22)  Long term goal 1: Pt will perform bed mobility with IND  -5/03 mod indep  Long term goal 2: Pt will perform transfers with IND   -5/03  sba  Long term goal 3: Pt will ambulate 150 ft with LRAD and supervision   -5/03 NAD sba to min A 60'  Long term goal 4: Pt will perform 10-12 steps with HR and supervision, demonstrating good energy conservation   -5/03 14 stairs total (7up/down) sba RHR up,  Long term goal 5: Pt will perform 12-15 reps of BLE exercises to improve strength by 5/4/22 5/03 progressing  Patient Goals   Patient goals : \"to go home\"      Therapy Time   Individual Concurrent Group Co-treatment   Time In 2434         Time Out 1338         Minutes 33         Timed Code Treatment Minutes: [address]

## 2022-05-03 NOTE — PROGRESS NOTES
05/02/22 0114   NIV Type   Skin Assessment Clean, dry, & intact   Skin Protection for O2 Device Yes   Location Nose   NIV Started/Stopped On   Equipment Type v60   Mode Bilevel   Mask Type Full face mask   Mask Size Large   Settings/Measurements   IPAP 15 cmH20   CPAP/EPAP 5 cmH2O   Rate Ordered 10   Resp 15   FiO2  30 %   I Time/ I Time % 1 s   Vt Exhaled 991 mL   Minute Volume 15 Liters   Mask Leak (lpm) 31 lpm   Comfort Level Good   Using Accessory Muscles No   SpO2 96   Alarm Settings   Alarms On Y   Low Pressure 6 cmH2O   High Pressure  30 cmH2O   Apnea (secs) 20 secs   RR Low  6   RR High 40 br/min   Oxygen Therapy/Pulse Ox   SpO2 97 %

## 2022-05-03 NOTE — CARE COORDINATION
[facility]    Referral received from CM to follow for home care services. I will follow for needs, and speak with patient to verify demos.   Pending auth from [facility]    Order/Referral needs sent to [facility] and I will follow up on auth request  CM to call [phone] ask for triage nurse  [facility]  Fax: order; facesheet; h&p; avs; dc summary to triage nurse/[facility] pcp  Let them know patient requesting [facility] for home care    Written auth needs faxed to [facility] [phone]     [facility] will call [facility] home care coordinator to follow up on auth request  [phone] Lexy Speaker     For additional assistance can reach out to [facility] [address] Supervisor Lan Harrington [phone]     Russell Santiago RN, BSN CTN  [facility] [phone]

## 2022-05-03 NOTE — DISCHARGE INSTR - COC
Continuity of Care Form    Patient Name: Yi Webster   :  1950  MRN:  [id number]    Admit date:  2022  Discharge date:  3 May 2022    Code Status Order: Full Code   Advance Directives:      Admitting Physician:  Yordan Guerra MD  PCP: Jayson Burton MD    Discharging Nurse: [facility] Unit/Room#: 3525/2161-19  Discharging Unit Phone Number: ***    Emergency Contact:   Extended Emergency Contact Information  Primary Emergency Contact: MARIA ESTHER Levi [address] Phone: [phone]  Work Phone: [phone]  Relation: Child  Secondary Emergency Contact: [facility]  Mobile Phone: [phone]  Relation: Child    Past Surgical History:  Past Surgical History:   Procedure Laterality Date    BRONCHOSCOPY N/A 2022    BRONCHOSCOPY DIAGNOSTIC AND CELL [address] ONLY performed by Ninoska Noriega MD at [facility]  2022    BRONCHOSCOPY FOREIGN BODY REMOVAL performed by Ninoska Noriega MD at [address] [place] [address] Right 14    incarcerated right inguinal hernia repair    OTHER SURGICAL HISTORY  2014    ROBOTIC ASSISTED LAPAROSCOPIC PROSTATECTOMY    PROSTATE BIOPSY      TONSILLECTOMY         Immunization History:   Immunization History   Administered Date(s) Administered    Tdap (Boostrix, Adacel) 2013       Active Problems:  Patient Active Problem List   Diagnosis Code    Anxiety state F41.1    Essential hypertension I10    Colon polyp K63.5    Vitamin D deficiency E55.9    Prostate cancer ([facility] C61    Lower leg DVT (deep venous thromboembolism), acute ([facility] I82.4Z9    Pulmonary embolism ([facility]) I26.99    Post-phlebitic syndrome I87.009    Depression F32. A    NSVT (nonsustained ventricular tachycardia) ([facility]) I47.2    Hx of deep venous thrombosis Z86.718    COPD exacerbation ([facility]) J44.1    History of pulmonary embolus (PE) Z86.711    PAF (paroxysmal atrial fibrillation) (HCC) I48.0    COPD with acute exacerbation (HCC) J44.1    Acute on chronic respiratory failure (HCC) J96.20    Other emphysema (HCC) J43.8    Stage 3a chronic kidney disease (HCC) N18.31    Current smoker F17.200    Elevated lactic acid level R79.89    Hypercalcemia E83.52    Enlarged RV (right ventricle) I51.7    Grade I diastolic dysfunction J53.34    CKD (chronic kidney disease) N18.9    Chest congestion R09.89    Mucus plugging of bronchi T17.500A    Ineffective airway clearance R06.89       Isolation/Infection:   Isolation            No Isolation          Patient Infection Status       Infection Onset Added Last Indicated Last Indicated By Review Planned Expiration Resolved Resolved By    None active    Resolved    COVID-19 (Rule Out) 04/28/22 04/28/22 04/28/22 COVID-19, Rapid (Ordered)   04/28/22 Rule-Out Test Resulted            Nurse Assessment:  Last Vital Signs: /75   Pulse 73   Temp 98.3 °F (36.8 °C) (Oral)   Resp 18   Ht 6' 5\" (1.956 m)   Wt 210 lb (95.3 kg)   SpO2 97%   BMI 24.90 kg/m²     Last documented pain score (0-10 scale): Pain Level: 0  Last Weight:   Wt Readings from Last 1 Encounters:   04/28/22 210 lb (95.3 kg)     Mental Status:  {IP PT MENTAL STATUS:20030}    IV Access:  { FIDELIA IV ACCESS:998406387}    Nursing Mobility/ADLs:  Walking   {P DME MMIU:997175621}  Transfer  {P DME NDGQ:516517783}  Bathing  {P DME HDPZ:857320875}  Dressing  {P DME XIYY:976319535}  Toileting  {P DME SJZD:315577521}  Feeding  {P DME GSUR:304436799}  Med Admin  {P DME HYCJ:936393614}  Med Delivery   { FIDELIA MED Delivery:775067565}    Wound Care Documentation and Therapy:        Elimination:  Continence:    Bowel: {YES / QR:37724}  Bladder: {YES / SX:26659}  Urinary Catheter: {Urinary Catheter:996562980}   Colostomy/Ileostomy/Ileal Conduit: {YES / HB:38469}       Date of Last BM: ***    Intake/Output Summary (Last 24 hours) at 5/3/2022 1206  Last data filed at 5/2/2022 1434  Gross per 24 hour   Intake 800 ml   Output --   Net 800 ml     I/O last 3 completed shifts:   In: 80 [I.V.:810]  Out: -     Safety Concerns:     [address] FIDELIA Safety Concerns:537550153}    Impairments/Disabilities:      [address] FIDELIA Impairments/Disabilities:313040738}    Nutrition Therapy:  Current Nutrition Therapy:   [address] FIDELIA Diet List:829956684}    Routes of Feeding: {CHP DME Other Feedings:873578334}  Liquids: {Slp liquid thickness:88805}  Daily Fluid Restriction: {CHP DME Yes amt example:158551807}  Last Modified Barium Swallow with Video (Video Swallowing Test): {Done Not Done QWUT:596586926}    Treatments at the Time of Hospital Discharge:   Respiratory Treatments: ***  Oxygen Therapy:  {Therapy; copd oxygen:45521}  Ventilator:    { CC Vent LIAR:508480039}    Rehab Therapies: Physical Therapy, Occupational Therapy, and Nurse  Weight Bearing Status/Restrictions: [address]  Weight Bearin}  Other Medical Equipment (for information only, NOT a DME order):  {EQUIPMENT:997378480}  Other Treatments: HOME HEALTH CARE: LEVEL 3 [address] [place] [address], [address] agency to establish plan of care for patient over 60 day period   Nursing  Initial home SN evaluation visit to occur within 24-48 hours for:  1)  medication management  2)  VS and clinical assessment  3)  S&S chronic disease exacerbation education + when to contact MD/NP  4)  care coordination  Medication Reconciliation during 1st SN visit  PT/OT/Speech   Evaluations in home within 24-48 hours of discharge to include DME and home safety   Frontload therapy 5 days, then 3x a week   OT to evaluate if patient has [address] needs for personal care    evaluation within 24-48 hours to evaluate resources & insurance for potential AL, [place], LTC, and Medicaid options   Palliative Care referral within 5 days of hospital discharge   PCP Visit scheduled within 3 - 7 days of hospital discharge    [place] care Vitals (If patient is agreeable and meets guidelines)      Patient's personal belongings (please select all that are sent with patient):  {CHP DME Belongings:984346216}    RN SIGNATURE:  {Esignature:111922976}    CASE MANAGEMENT/SOCIAL WORK SECTION    Inpatient Status Date: 4/28/22    Readmission Risk Assessment Score:  Readmission Risk              Risk of Unplanned Readmission:  21           Discharging to Facility/ [address] [place] [address]    / signature: Electronically signed by Yasmin Lazaro RN on 5/3/22 at 2:36 PM EDT    PHYSICIAN SECTION    Prognosis: Good    Condition at Discharge: Stable    Rehab Potential (if transferring to Rehab): Good    Recommended Labs or Other Treatments After Discharge: None    Physician Certification: I certify the above information and transfer of Rodri Chavarria  is necessary for the continuing treatment of the diagnosis listed and that he requires [address] for less 30 days.      Update Admission H&P: No change in H&P    PHYSICIAN SIGNATURE:  Electronically signed by Rhoda Marx MD on 5/3/22 at 12:07 PM EDT

## 2022-05-03 NOTE — PROCEDURES
INSTRUMENTAL SWALLOW REPORT  MODIFIED BARIUM SWALLOW    NAME: Sj Ireland   : 1950  MRN: [id number]       Date of Eval: 5/3/2022     Ordering Physician: Dr. Azeem Noel  Radiologist: Dr. Birdie Adler Recommendation: IDDSI 7 Regular solids/IDDSI 0 thin liquids. ,meds whole or halved in puree  Risk Management: Recommend use of effortful swallow (\"swallow hard\") for all po intake as compensatory strategy and for strethening of oropharyngeal swallow/improved laryngeal vestibule closure and anterior hyoid excursion. Recommend upright position during and 30 minutes after meals, small bites/sips, slow rate of po intake, oral care before and after meals, and encourage self-feeding. Control risk factors for aspiration PNA by completing oral care 3-4x/day and increasing physical mobility as is medically feasible    Recommend  ST for dysphagia tx to instruct pt in effortful swallow as exercise and compensatory strategy with goal of strengthening oropharyngeal swallow and eventually fading as a strategy during po intake         Referring Diagnosis(es): Referring Diagnosis: Suspected Pharyngo-esophageal Dysphagia    Past Medical History:  has a past medical history of Abdominal Pain, Anxiety State, BPH (benign prostatic hyperplasia), Cancer ([address] Chest pain, Colon polyp, DVT, lower extremity ([address] Hernia, HYPERTENSION, PE (pulmonary embolism), Prostatitis, Hx of, and SBO (small bowel obstruction) ([address] Past Surgical History:  has a past surgical history that includes hernia repair; Prostate biopsy; Tonsillectomy; other surgical history (2014); Inguinal hernia repair (Right, 14); bronchoscopy (N/A, 2022); and bronchoscopy (2022).             Date of Prior Study: 2021 at [place]  Type of Study: Repeat MBS  Results of Prior Study: unknown as report unavailble; Pt treated for Oral Dysphagia x1 by ST 2021    Recent CXR/CT of Chest: Date 2022   Lung volumes are increased compatible with COPD/emphysema.  Thoracic aorta is   tortuous.  Heart is normal in size.  No pleural fluid or pneumonia. Unchanged apical scarring.  Osseous structures are stable. Patient Complaints/Reason for Referral:  Jaziel Carbajal was referred for a MBS to assess the efficiency of his/her swallow function, assess for aspiration, and to make recommendations regarding safe dietary consistencies, effective compensatory strategies, and safe eating environment. Patient complaints: solids sticking in throat; pea in main bronchus during bronchoscopy    Onset of problem:   Date of Onset: admit 04/28/2022       Subjective  Subjective: Pt seen in radiology suite. O2 per NC at 1.5L which is pt's baseline    Behavior/Cognition/Vision/Hearing:  Behavior/Cognition: Alert; Cooperative  Vision Exceptions: Wears glasses for reading  Hearing: Within functional limits       Medical record review/interview: Per MD note, \"79 y. o. male who presented to [facility] with SOB and cough. He notes that it started 2 to 3 days ago. He has a history of COPD. He wears 1-2L O2 at baseline. However, it was not helping and his SOB worsened. He then started coughing and noted he was bringing up a lot of mucus. He ran out of his inhaler. He decided to come into the ED. He has a h/o PE approx 7 years ago and takes xarelto daily. He smoked 1ppd for 40-50 years. Lately he has been smoking 1/3 ppd due to SOB. No smoking the past 2 days.  Denies fever, chills, N/V\"        Per pulm note: \" Patient underwent bronchoscopy and large amounts of thick mucous plugs and food particles was aspirated from the airways, patient is feeling better, patient does not have any significant cough or chest congestion at this time, no increasing shortness of breath or wheezing, patient does not have any pleuritic chest pain,, patient was afebrile and hemodynamically maintained, patient was on 1.5 L of nasal oxygen with saturation of 97%, patient had sinus rhythm on the monitor, patient  patient's glycemic control was acceptable,, patient was alert and communicative, no other pertinent review of system of concern\"    Pt had partial thyroid removal in 7/2021 with notes suggesting benign neoplasm and goiter. Pt states that swallowing trouble began at time of that surgery. Pt had MBS as inpatient  7/21/2021 at [place] and appears as if he had one f/u ST session. No reports available. Charges in [address] and pt report of tongue exercises suggest oral phase dysphagia observed. Pt completed exercises for a few weeks with some perceived improvement. No repeat MBS completed.     Pt reports dysphagia with solids and pills persists since that time. Pt endorses unintended weight loss over past three months but unable to specify how much weight has been lost. Pt drinks Ensure and thin liquids and eats mostly puree, but does occasionally still eat regular solids. Pt reports he did not like minced and moist breakfast served this day, so he did not eat it. Pt reports no reflux/heartburn symptoms. Solid dysphagia reported as intermittent  Predisposing dysphagia risk factors: COPD; current smoker  Clinical signs of possible chronic dysphagia: weight loss and hx of dysphagia; limited variety of consistencies in po intake  Precipitating dysphagia risk factors: increased O2 demands    Trials and Findings:  IDDSI 0 (thin): Assessed via tsp and cup. Mildly Impaired oral phase characterized by premature spillage to valleculae with subsequent premature spillage to pyriform sinuses and small amount stasis in oral cavity post swallow of thin by cup . Mildly Impaired pharyngeal phase characterized by reduced laryngeal elevation, reduced anterior hyoid excursion, insufficient extent of cricopharyngeal opening and pharyngeal stripping wave WNL resulting in shallow penetration during the swallow that is completely cleared during 5mL thin by spoon presented by SLP but not when pt self-feeds 5 mL from cup. No aspiration is observed. Effortful swallow with thin by cup results in longer and more complete laryngeal vestibule closure and eliminates penetration during the swallow  IDDSI 2 (mildly thick): Assessed via tsp, cup, and straw. Normal oral phase characterized by small amount of premature spillage to valleculae. Normal pharyngeal phase characterized by functional hyolaryngeal elevation and excursion, timely and functional epiglottic inversion and retroflexion and pharyngeal stripping wave WNL resulting in no aspiration or penetration. CP bar again visualized. IDDSI 3 (moderately thick):Assessed via tsp. Normal oral phase characterized. Normal pharyngeal phase characterized by functional hyolaryngeal elevation and excursion, timely and functional epiglottic inversion and retroflexion and pharyngeal stripping wave WNL resulting in no aspiration or penetration. Prominent cricopharyngeus again observed which partially impedes bolus flow but no residue remains at level of UES. IDDSI 4 (puree): Assessed via tsp using 5cc bolus. Normal oral phase. Normal pharyngeal phase characterized by functional hyolaryngeal elevation and excursion, timely and functional epiglottic inversion and retroflexion and pharyngeal stripping wave WNL resulting in no aspiration or penetration  IDDSI 5 (minced and moist):DNT  IDDSI 6 (soft and bite-sized):DNT  IDDSI 7 (regular): Normal oral phase characterized by functional rotary mandibular movement, good A-P bolus transit and timely swallow. Normal pharyngeal phase characterized by functional hyolaryngeal elevation and excursion, timely and functional epiglottic inversion and retroflexion and pharyngeal stripping wave WNL resulting in no aspiration or penetration      Impressions:  Assessment: Mild oral/pharyngeal dysphagia, likely acute-on-chronic related to h/o dysphagia, COPD, and COPDAE. Swallow safety is preserved; swallow efficiency is preserved.  Pt appears to be at low risk for aspiration PNA, and low risk for malnutrition/dehydration. Diet modification is not indicated. Swallow prognosis is good/ given willingness to participate in therapy, good caregiver support, and demonstration of effectiveness of effortful swallow as an exercise and compensatory strategy in improving laryngeal vestibule closure. Patient appears to be a good candidate for behavioral swallow rehabilitation. Diet Recommendation: IDDSI 7 Regular solids/IDDSI 0 thin liquids. ,meds whole or halved in puree  Risk Management: Recommend use of effortful swallow (\"swallow hard\") for all po intake as compensatory strategy and for strethening of oropharyngeal swallow/improved laryngeal vestibule closure and anterior hyoid excursion. Recommend upright position during and 30 minutes after meals, small bites/sips, slow rate of po intake, oral care before and after meals, and encourage self-feeding. Control risk factors for aspiration PNA by completing oral care 3-4x/day and increasing physical mobility as is medically feasible. Recommend [facility] for dysphagia tx to instruct pt in effortful swallow as exercise and compensatory strategy with goal of strengthening oropharyngeal swallow and eventually fading as a strategy during po intake    Specialist Referrals: If pt continues to experience solid food dysphagia despite oropharyngeal dysphagia tx, recommend GI consult to assess esophageal structure and motility given prominent cricopharyngeus identified by radiologist during [address] Pt's shoulder obscured clear view of esophagus during MBS. Ancillary Tests: consider esophageal motility study  Goals: See below  Follow-up exam: as needed after course of dysphagia tx      Treatment Dx and ICD 10: R 13.12   Patient Position: Lateral        Consistencies Administered: Regular;Pureed; Thin teaspoon; Thin cup;Mildly Thick teaspoon;Mildly Thick cup;Moderately Thickteaspoon                        Upper Esophageal Screen: no Penetration-Aspiration Scale (PAS): 2 - Material enters the airway, remains above the vocal folds, and is ejected from the airway    Recommended Diet:  Solid consistency: Regular  Liquid consistency: Thin  Liquid administration via: Cup    Medication administration: Meds in puree    Safe Swallow Protocol:  Supervision: Independent  Compensatory Swallowing Strategies : Upright as possible for all oral intake;Remain upright for 30-45 minutes after meals;Eat/Feed slowly; Small bites/sips;Effortful swallow              Recommendations/Treatment  Requires SLP Intervention: Yes        D/C Recommendations: Ongoing speech therapy is recommended at next level of care         Recommended Exercises:    Therapeutic Interventions: Diet tolerance monitoring;Patient/Family education;Pharyngeal exercises    Referral To:  (consider GI for assessment of esophageal structure and motility if pt complaints persist)    Education: Images and recommendations were reviewed with pt and radiologist following this exam.   Patient Education: Education completed re: purpose of MBS, compensatory strategies, results, recommendations  Patient Education Response: Verbalizes understanding;Demonstrated understanding    Safety Devices  Safety Devices in place: Not Applicable (left in care of transporter in no distress)  Restraints Initially in Place: No      Goals:    Long Term:   Timeframe for Long-term Goals: 7 days by 05/10/2022  Goal 1: Pt ahmet demonstrate functional swallow with no decline in respiratory status with use of safe swallow strategies        Short Term:     Goal 1: Pt will demonstrate effortful swallow with and without bolus for increased laryngeal vestibular closure in 100 swallows per session  Goal 2: Pt will demonstrate understanding of safe swallow strategies and ways to decrease adverse outcomes associated with aspiration              Frequency of tx: 2-4x/wk       Esophageal Phase  Esophageal Screen: Impaired (prominent cricopharyngeus noted)      Dysphagia tx:  Skilled dysphagia tx completed based on review of MBS and compensatory strategies developed during instrumental assessment. Pt demonstrates undertanding of recommendation for 100 effortful swallow 3x/day for 5-6 weeks with goal of increased laryngeal vestibule closure. Recommend [address] fro dysphagia tx upon d/c. Pain      Pain Level: 0        Therapy Time:   Individual Concurrent Group Co-treatment   Time In 1535         Time Out 1605         Minutes 30             Signature:  Matt Hansen M.A.  [address], SLP, 5/3/2022, 4:49 PM

## 2022-05-03 NOTE — PROGRESS NOTES
Speech Language Pathology    Speech Language Pathology  Clinical Bedside Swallow Assessment  Facility/Department: [facility]  - MED SURG/ORTHO      NAME:Rafael Cantrell  : 1950 (70 y.o.)   MRN: [id number]  ROOM: [address] [place] [address]     Recommend MBS to assess oropharyngeal swallow function for treatment planning purposes and to determine need for diet modification    Diet recommendation: IDDSI 4 Puree Solids; IDDSI 0 Thin Liquids; Meds crushed in puree as able  Risk management: upright for all intake, stay upright for at least 30 mins after intake, small bites/sips, oral care 2-3x/day to reduce adverse affects in the event of aspiration, increase physical mobility as able, slow rate of intake, general aspiration precautions and hold PO and contact SLP if s/s of aspiration or worsening respiratory status develop.     ADMISSION DATE: 2022  PATIENT DIAGNOSIS(ES): COPD exacerbation (HCC) [J44.1]  COPD with acute exacerbation (HCC) [J44.1]  Acute respiratory failure with hypoxia ([address] [J96.01]  Chief Complaint   Patient presents with    Shortness of Breath     pt has COPD-on 1.5 l/nc at home;HHN per EMS;SOB x 2 days; ran out of resques inhaler 2 days ago     Patient Active Problem List    Diagnosis Date Noted    Chest congestion 2022    Mucus plugging of bronchi 2022    Ineffective airway clearance 2022    CKD (chronic kidney disease) 2022    COPD with acute exacerbation ([address] 2022    Acute on chronic respiratory failure ([address] 2022    Other emphysema ([address] 2022    Stage 3a chronic kidney disease ([address] 2022    Current smoker 2022    Elevated lactic acid level 2022    Hypercalcemia 2022    Enlarged RV (right ventricle) 2022    Grade I diastolic dysfunction     PAF (paroxysmal atrial fibrillation) ([address] 2021    COPD exacerbation ([address] 04/10/2021    History of pulmonary embolus (PE) 04/10/2021    NSVT (nonsustained ventricular tachycardia) ([address] 03/23/2017    Hx of deep venous thrombosis 03/23/2017    Depression 12/08/2014    Post-phlebitic syndrome 05/12/2014    Pulmonary embolism ([address] 02/07/2014    Lower leg DVT (deep venous thromboembolism), acute ([address] 02/06/2014    Prostate cancer ([address] 01/06/2014    Vitamin D deficiency 10/11/2013    Colon polyp 03/27/2012    Anxiety state     Essential hypertension      Past Medical History:   Diagnosis Date    Abdominal Pain     Anxiety State     BPH (benign prostatic hyperplasia) 10/3/2013    Cancer ([address] 2014    prostate    Chest pain     Colon polyp 3/27/2012    DVT, lower extremity ([address]     Left Leg    Hernia 1/24/2014    HYPERTENSION     PE (pulmonary embolism) 2/7/2014    Pt denies    Prostatitis, Hx of     SBO (small bowel obstruction) ([address] 1/24/2014     Past Surgical History:   Procedure Laterality Date    BRONCHOSCOPY N/A 5/2/2022    BRONCHOSCOPY DIAGNOSTIC AND CELL [address] ONLY performed by Brittney Bajwa MD at [address] [place] [address]  5/2/2022    BRONCHOSCOPY FOREIGN BODY REMOVAL performed by Brittney Bajwa MD at [facility] 1/23/14    incarcerated right inguinal hernia repair    OTHER SURGICAL HISTORY  1/16/2014    ROBOTIC ASSISTED LAPAROSCOPIC PROSTATECTOMY    PROSTATE BIOPSY      TONSILLECTOMY       No Known Allergies    DATE ONSET: admit 04/28/2022    Date of Evaluation: 5/3/2022   Evaluating Therapist: Sonya Tristan SLP    Chart Reviewed: : [x] Yes [] No    Current Diet: ADULT DIET; Dysphagia - Minced and Moist  ADULT ORAL NUTRITION SUPPLEMENT; Breakfast, Lunch, Dinner; Standard High Calorie/High Protein Oral Supplement    Recent Chest Radiography: [x] Chest XR   [] CT of Chest  Date:04/28/2022  Impressions  Lung volumes are increased compatible with COPD/emphysema.  Thoracic aorta is   tortuous.  Heart is normal in size.  No pleural fluid or pneumonia. Unchanged apical scarring.  Osseous structures are stable. Pain: no c/o pain    Reason for Referral  [facility] was referred for a bedside swallow evaluation to assess the efficiency of their swallow function, identify signs and symptoms of aspiration and make recommendations regarding safe dietary consistencies, effective compensatory strategies, and safe eating environment. Assessment    Medical record review/interview:Per MD note, \"79 y.o. male who presented to [facility] with SOB and cough. He notes that it started 2 to 3 days ago. He has a history of COPD. He wears 1-2L O2 at baseline. However, it was not helping and his SOB worsened. He then started coughing and noted he was bringing up a lot of mucus. He ran out of his inhaler. He decided to come into the ED. He has a h/o PE approx 7 years ago and takes xarelto daily. He smoked 1ppd for 40-50 years. Lately he has been smoking 1/3 ppd due to SOB. No smoking the past 2 days.  Denies fever, chills, N/V\"      Per pulm note: \" Patient underwent bronchoscopy and large amounts of thick mucous plugs and food particles was aspirated from the airways, patient is feeling better, patient does not have any significant cough or chest congestion at this time, no increasing shortness of breath or wheezing, patient does not have any pleuritic chest pain,, patient was afebrile and hemodynamically maintained, patient was on 1.5 L of nasal oxygen with saturation of 97%, patient had sinus rhythm on the monitor, patient  patient's glycemic control was acceptable,, patient was alert and communicative, no other pertinent review of system of concern\"        Patient Complaints:  Odynophagia: [] Yes [x] No  Globus Sensation: [x] Yes [] No  SOB with PO intake: [] Yes [x] No  Increased WOB with PO intake: [] Yes [x] No  Reflux Sx's: [] Yes [x] No  Weight loss: [x] Yes [] No  Coughing/Choking with PO intake: [x] Yes [] No  Reduced Appetite: [] Yes [x] No    Additional Reported Symptoms/Complaints:   Pt had partial thyroid removal in 7/2021 with notes suggesting benign neoplasm and goiter. Pt states that swallowing trouble began at time of that surgery. Pt had MBS as inpatient  7/21/2021 at [place] and appears as if he had one f/u ST session. No reports available. Charges in Kevin and pt report of tongue exercises suggest oral phase dysphagia observed. Pt completed exercises for a few weeks with some perceived improvement. No repeat MBS completed. Pt reports dysphagia with solids and pills persists since that time. Pt endorses unintended weight loss over past three months but unable to specify how much weight has been lost. Pt drinks Ensure and thin liquids and eats mostly puree, but does occasionally still eat regular solids. Pt reports he did not like minced and moist breakfast served this day, so he did not eat it. Pt reports no reflux/heartburn symptoms. Solid dysphagia reported as intermittent. Predisposing dysphagia risk factors: COPD and Current smoker  Clinical signs of possible chronic dysphagia: weight loss and hx of dysphagia  Precipitating dysphagia risk factors: increased O2 demands; pea located in mainstem bronchus and removed during bronchoscopy 05/02/2022. Vitals/labs:     Vitals:    05/03/22 0415 05/03/22 0420 05/03/22 0829 05/03/22 0908   BP:    132/75   Pulse:    73   Resp: 17  18 18   Temp:    98.3 °F (36.8 °C)   TempSrc:    Oral   SpO2:  94% 94% 97%   Weight:       Height:            CBC:   Recent Labs     05/03/22  0738   WBC 9.3   HGB 12.4*         BMP:  Recent Labs     05/03/22  0738      K 4.3      CO2 33*   BUN 43*   CREATININE 1.2   GLUCOSE 96          Cranial nerve exam:   CN V (trigeminal): ophthalmic, maxillary, and mandibular facial sensation- WNL b/l  CN VII (facial): WNL b/l  CN IX/X (glossopharyngeal/vagus): MPT: Adequate; pitch range: Adequate; vocal quality: Adequate; cough: Strong-perceptually  CN XII (hypoglossal):  WNL b/l      Laryngeal function exam:   Secretions: WNL  Vocal quality: See CN exam above  MPT: See CN exam above  S/Z ratio: 1:1  Pitch range: See CN exam above  Cough: See CN exam above    Oral Care Status:    [] Oral Care [facility]  [x] Poor oral care status  [] Edentulous  [] Upper Dentures  [] Lower Dentures  [x] Missing/Broken Teeth  [] Evidence of dental cavities/carries    PO trials:   Ice: Oral swallow appears WFL. No overt clinical signs of dysphagia observed. IDDSI 0 (thin):   - 5cc bolus (tsp): no anterior bolus loss , suspect functional A-P bolus transit, swallow timing subjectively appears timely and no clinical s/s of aspiration  - Cup: no anterior bolus loss , suspect functional A-P bolus transit, swallow timing subjectively appears timely and no clinical s/s of aspiration  - Straw: DNT    IDDSI 2 (mildly thick): DNT    IDDSI 3 (moderately thick): DNT    IDDSI 4 (puree): no anterior bolus loss , suspect functional A-P bolus transit, swallow timing subjectively appears timely, oral clearance grossly WFL and no clinical s/s of aspiration; pt swallows twice in 2/2 presentations. IDDSI 5 (minced and moist): DNT    IDDSI 6 (soft and bite sized): DNT    IDDSI 7 (regular): no anterior bolus loss , suspect functional A-P bolus transit, swallow timing subjectively appears timely, oral clearance grossly WFL and no clinical s/s of aspiration; Pt c/o sensation of cracker sticking in pharynx. Liquids wash minimizes sensation and pt reports improved clearance. 3 oz water: PASS    Impressions:  Clinical signs of pharyngoesophageal dysphagia likely chronic related to hx of dysphagia. Swallow prognosis is good. Instrumental swallow study is indicated given pt c/o pill and solid dysphagia and presence of pea in mainstem bronchus on bronchoscopy.   Given tolerance to PO at bedside and lack of clinical s/s of aspiration at bedside, pt is safe for oral diet prior to instrumental study , though recommend diet modification to pureed solids and thin liquids prior to MBS per pt preference. Instrumentation: warranted  Diet recommendation: IDDSI 4 Puree Solids; IDDSI 0 Thin Liquids; Meds crushed in puree as able  Risk management: upright for all intake, stay upright for at least 30 mins after intake, small bites/sips, oral care 2-3x/day to reduce adverse affects in the event of aspiration, increase physical mobility as able, slow rate of intake, general aspiration precautions and hold PO and contact SLP if s/s of aspiration or worsening respiratory status develop. Prognosis: Good    Recommended Intervention:  [x] Dysphagia tx  [] Videostroboscopy                      [] NPO   [x] MBS       [] Speech/Cog Eval    [] Therapeutic PO Trials     [] Ice Chips   [] Other:  [] FEES                                                 Dysphagia Therapeutic Intervention:  []  Bolus control Exercises  []  Oral Motor Exercises  []  [employer] Protocol  []  Thermal Stimulation  []  Oral Care    []  Vital Stim/NMES  []  Laryngeal Exercises  [x]  Patient/Family Education  []  Pharyngeal Exercises  []  Therapeutic PO trials with SLP  [x]  Diet tolerance monitoring  [x]  Other: TBD following MBS    Referrals:  [] ENT    [] PT  [] Pulmonology [] GI  [] Neurology  [] RD  [] OT   []     Goals:  Short Term Goals:  Timeframe for Short Term Goals: (5 days 05/08/2022)  Goal 1: The patient will tolerate instrumental assessment of swallow for assessment of oropharyngeal swallow function  Goal 2: The patient/caregiver will demonstrate understanding of compensatory swallow strategies, for improved swallow safety and ways to decrease adverse outcomes associated with aspiration.         Long Term Goals:   Timeframe for Long Term Goals: (7 days 05/10/2022)  Goal 1: The patient will demonstrate functional swallow of preferred consistencies with no worsening respiratory/pulmonary status and use of safe swallow strategies to decrease adverse outcomes associated with aspiration. Treatment:  Skilled instruction completed with patient re: evidenced based practice regarding recommendations and POC, importance of oral care to reduce adverse affects in the event of aspiration, and instruction of recommended compensatory strategies developed based upon clinical exam. Potential strategies introduced for use during MBS. Pt able to recall/demonstrate compensatory strategies with min cues and demonstration. Pt Education: SLP educated the patient re: Role of SLP, rationale for completion of assessment, anatomical components of swallow structures as they pertain to airway protection, results of assessment, recommendations, POC and rationale for MBS  Pt Education Response: verbalized understanding, demonstrated understanding and RN aware    Duration/Frequency of Tx: pending results of MBS  Individuals Consulted:   [x]  Patient     []  NP         [x]  RN   []  RD                   [x]  MD      []  Family Member                        []  PA    []  Other:      Safety Devices / Report:  [x]  All fall risk precautions in place []  Safety handoff completed with RN  [x]  Bed alarm in place  [x]  Left in bed     []  Chair alarm in place  []  Left in chair   [x]  Call light in reach   []  Other: Total Treatment Time / Charges       Time in Time out Total Time / units   Swallow Eval/Tx Time  1048  1120 1120  1137 32 min/1 unit  17 min 1 unit     Signature:  Neena Anderson. Almita Sherwood M.A.  [address] [facility], [place]  Speech-Language Pathologist

## 2022-05-03 NOTE — PROGRESS NOTES
INPATIENT PULMONARY CRITICAL CARE PROGRESS NOTE      Reason for visit    COPD exacerbation     SUBJECTIVE: Patient underwent bronchoscopy and large amounts of thick mucous plugs and food particles was aspirated from the airways, patient is feeling better, patient does not have any significant cough or chest congestion at this time, no increasing shortness of breath or wheezing, patient does not have any pleuritic chest pain,, patient was afebrile and hemodynamically maintained, patient was on 1.5 L of nasal oxygen with saturation of 97%, patient had sinus rhythm on the monitor, patient  patient's glycemic control was acceptable,, patient was alert and communicative, no other pertinent review of system of concern        Physical Exam:  Blood pressure (!) 102/54, pulse 72, temperature 97.3 °F (36.3 °C), temperature source Axillary, resp. rate 18, height 6' 5\" (1.956 m), weight 210 lb (95.3 kg), SpO2 94 %.'     Constitutional:  In no significant respiratory distress, no chest congestion,  HENT:  Oropharynx is clear and moist. No thyromegaly. Eyes:  Conjunctivae are normal. Pupils equal, round, and reactive to light. No scleral icterus. Neck: . No tracheal deviation present. No obvious thyroid mass. Cardiovascular: Sinus t rhythm, normal heart sounds. No right ventricular heave. No lower extremity edema. Pulmonary/Chest:  No significant wheezes. Minimal rales. No chest congestion Chest wall is not dull to percussion. No accessory muscle usage or stridor. Decreased breath sound intensity, prolonged expiration  Abdominal: Soft. Bowel sounds present. No distension or hernia. No tenderness. Musculoskeletal: No cyanosis. No clubbing. No obvious joint deformity. Lymphadenopathy: No cervical or supraclavicular adenopathy. Skin: Skin is warm and dry. No rash or nodules on the exposed extremities. Psychiatric: Normal mood and affect. Behavior is normal.  No anxiety. Neurologic: Alert, awake and oriented. PERRL. Speech fluent     Results:  CBC:   Recent Labs     05/01/22  0624 05/02/22  0640 05/03/22  0738   WBC 12.7* 8.9 9.3   HGB 12.8* 12.7* 12.4*   HCT 38.7* 38.1* 37.5*   MCV 97.6 97.1 96.0    233 224     BMP:   Recent Labs     05/01/22  0624 05/02/22  0640 05/03/22  0738    140 140   K 4.7 4.9 4.3    101 100   CO2 34* 32 33*   PHOS 3.2 2.9 2.7   BUN 54* 53* 43*   CREATININE 1.4* 1.4* 1.2       Imaging:  I have reviewed radiology images personally. XR CHEST PORTABLE   Final Result   Increased lung volumes, no acute process detected. XR CHEST PORTABLE    Result Date: 4/28/2022  EXAMINATION: ONE XRAY VIEW OF THE CHEST 4/28/2022 12:15 pm COMPARISON: 02/10/2022 HISTORY: ORDERING SYSTEM PROVIDED HISTORY: shortness of breath TECHNOLOGIST PROVIDED HISTORY: Reason for exam:->shortness of breath Reason for Exam: sob FINDINGS: Lung volumes are increased compatible with COPD/emphysema. Thoracic aorta is tortuous. Heart is normal in size. No pleural fluid or pneumonia. Unchanged apical scarring. Osseous structures are stable. Increased lung volumes, no acute process detected. Results for Teddy Flores (MRN [id number]) as of 5/3/2022 09:56   Ref. Range 5/2/2022 16:52   CULTURE, RESPIRATORY Unknown Rpt   Gram Stain Result Unknown 2+ WBC's (Polymor. .. CULTURE, RESPIRATORY Unknown Further report to follow       Assessment:  Principal Problem:    COPD with acute exacerbation ([facility]  Active Problems:    Acute on chronic respiratory failure (HCC)    CKD (chronic kidney disease)    Chest congestion    Mucus plugging of bronchi    Ineffective airway clearance    Essential hypertension  Resolved Problems:    * No resolved hospital problems.  *          Plan:   · Oxygen supplementation to keep oxygen saturation between 90 and 94% only  · Please titrate the oxygen as per the above parameters  · Patient needs to evaluated for home oxygen  · Monitor for any worsening hypoventilation and hypercarbia  · Bronchodilators in the form of DuoNeb to continue  ·  PO prednisone in tapering doses  · Patient has increasing chest congestion with mucus plugging with ineffective airway clearance which is not improving with conservative management  · Status post therapeutic and diagnostic bronchoscopy with removal of food particles  · Speech evaluation has been requested  · Bronchoscopy cultures are pending  · Smoking cessation advised  · Nicotine replacement therapy if the patient wants  · Cardiac medications including anticoagulation as per IM  · Monitor input output and BMP  · Correct electrolytes on whenever necessary basis  · PUD prophylaxis     ?  Discharge planning     No other recommendations from Pulmonary/CCM stand point -will sign off -please call on PRN basis       Electronically signed by:  June Bowen MD    5/3/2022    8:40 AM.

## 2022-05-03 NOTE — PROGRESS NOTES
Physical Therapy  Facility/Department: [facility] - MED SURG/ORTHO  Daily Treatment Note  NAME: Tacy Severance  : 1950  MRN: [id number]    Date of Service: 5/3/2022    Discharge Recommendations:  Home with Home health PT,24 hour supervision or assist     [facility] 6 Clicks Inpatient Mobility:  AM-PAC Mobility Inpatient   How much difficulty turning over in bed?: None  How much difficulty sitting down on / standing up from a chair with arms?: A Little  How much difficulty moving from lying on back to sitting on side of bed?: None  How much help from another person moving to and from a bed to a chair?: A Little  How much help from another person needed to walk in hospital room?: A Little  How much help from another person for climbing 3-5 steps with a railing?: A Lot  AM-PAC Inpatient Mobility Raw Score : 19  AM-PAC Inpatient T-Scale Score : 45.44  Mobility Inpatient CMS 0-100% Score: 41.77  Mobility Inpatient CMS G-Code Modifier : CK      Patient Diagnosis(es): The primary encounter diagnosis was COPD exacerbation ([facility] A diagnosis of Acute respiratory failure with hypoxia (HCC) was also pertinent to this visit. Assessment   Assessment: Pt presents to [facility] with COPD exacerbation. PTA, pt independent with functional mobility and has 3 flights of steps into apartment. Daughter available to help as needed. Currently, pt functioning below baseline, requires CGA-min(A) for ambulation and had 1 notable LOB requiring assist. Pt would benefit from continued skilled PT to address deficits. Recommend home with HHPT upon d/c  Activity Tolerance: Patient limited by endurance     Plan    Plan  Plan: 3-5 times per week  Current Treatment Recommendations: Strengthening;Balance training;Functional mobility training;Transfer training; Endurance training;Neuromuscular re-education;Stair training;Gait training;Pain management;Home exercise program;Safety education & training;Patient/Caregiver education & training; Therapeutic activities     Restrictions  Restrictions/Precautions  Restrictions/Precautions: Bed Alarm,Up as Tolerated,Fall Risk  Position Activity Restriction  Other position/activity restrictions: 2L O2     Subjective    Subjective  Subjective: Pt agrees to PT session  Pain: No pain c/o  Orientation  Overall Orientation Status: Within Functional Limits     Objective   Vitals  Pulse: 74  BP: 118/72  BP Location: Right upper arm  MAP (Calculated): 87.33  SpO2: 93 % (02 sats down to 87% after activity-90\" to recover)  O2 Device: Nasal cannula (2L)  Bed Mobility Training  Supine to Sit: Modified independent  Scooting: Modified independent  Balance  Sitting: Intact  Standing: Impaired (1 slight  LOB during ambulation requiring stepping strategy and min(A) to correct)  Standing - Static: Occasional  Standing - Dynamic: Occasional  Transfer Training  Transfer Training: Yes  Overall Level of Assistance: Stand-by assistance  Sit to Stand: Stand-by assistance  Stand to Sit: Stand-by assistance  Gait Training  Gait Training: Yes  Gait  Overall Level of Assistance: Contact-guard assistance;Minimum assistance  Interventions: Verbal cues  Speed/Yadi: Slow  Step Length: Right shortened;Left shortened  Gait Abnormalities: Decreased step clearance  Distance (ft): 60 Feet  Assistive Device: Gait belt (pt declined use of rw.  Educated on purpose: to trial safety w/ RW)  Rail Use: Right (ascent)  Stairs - Level of Assistance: Stand-by assistance  Number of Stairs Trained: 7 (7 up/down= total 14)     PT Exercises  Exercise Treatment: performed BLE TE 15X EACH: AP, SLR, HIP ABD, LAQ, MARCHES       Safety Devices  Type of Devices: Call light within reach;Gait belt;Left in chair;Nurse notified     Patient Education  Education Given To: Patient  Education Provided: Home Exercise Program;Transfer Training;Energy Conservation  Education Method: Demonstration;Verbal  Education Outcome: Verbalized understanding    Goals  Long Term Goals  Time Frame for Long term goals : 7 days (5/8/22)  Long term goal 1: Pt will perform bed mobility with IND  -5/03 mod indep  Long term goal 2: Pt will perform transfers with IND   -5/03  sba  Long term goal 3: Pt will ambulate 150 ft with LRAD and supervision   -5/03 NAD sba to min A 60'  Long term goal 4: Pt will perform 10-12 steps with HR and supervision, demonstrating good energy conservation   -5/03 14 stairs total (7up/down) sba RHR up,  Long term goal 5: Pt will perform 12-15 reps of BLE exercises to improve strength by 5/4/22 5/03 progressing  Patient Goals   Patient goals : \"to go home\"      Therapy Time   Individual Concurrent Group Co-treatment   Time In 5036         Time Out 1338         Minutes 33         Timed Code Treatment Minutes: [address]

## 2022-05-03 NOTE — PROGRESS NOTES
05/02/22 2035   RT Protocol   History Pulmonary Disease 1   Respiratory pattern 0   Breath sounds 2   Cough 0   Indications for Bronchodilator Therapy On home bronchodilators   Bronchodilator Assessment Score 3

## 2022-05-03 NOTE — PROGRESS NOTES
Hospitalist Progress Note      PCP: Gelacio Britt MD    Date of Admission: 4/28/2022    Chief Complaint: SOB and Cough    Subjective: no new c/o. Medications:  Reviewed    Infusion Medications    sodium chloride       Scheduled Medications    docusate sodium  100 mg Oral BID    ipratropium-albuterol  1 ampule Inhalation BID    atorvastatin  80 mg Oral Nightly    metoprolol tartrate  12.5 mg Oral BID    rivaroxaban  20 mg Oral Dinner    sodium chloride flush  5-40 mL IntraVENous 2 times per day    predniSONE  40 mg Oral Daily    doxycycline hyclate  100 mg Oral 2 times per day    pantoprazole  40 mg Oral QAM AC     PRN Meds: sodium chloride, guaiFENesin-codeine, benzonatate, sodium chloride flush, sodium chloride, ondansetron **OR** ondansetron, polyethylene glycol, acetaminophen **OR** acetaminophen      Intake/Output Summary (Last 24 hours) at 5/3/2022 0832  Last data filed at 5/2/2022 1434  Gross per 24 hour   Intake 800 ml   Output --   Net 800 ml       Physical Exam Performed:    BP (!) 102/54   Pulse 72   Temp 97.3 °F (36.3 °C) (Axillary)   Resp 18   Ht 6' 5\" (1.956 m)   Wt 210 lb (95.3 kg)   SpO2 94%   BMI 24.90 kg/m²     General appearance: No apparent distress, appears stated age and cooperative. HEENT: Pupils equal, round, and reactive to light. Conjunctivae/corneas clear. Neck: Supple, with full range of motion. No jugular venous distention. Trachea midline. Respiratory:  Normal respiratory effort. Clear to auscultation, bilaterally without Rales/Wheezes/Rhonchi. Cardiovascular: Regular rate and rhythm with normal S1/S2 without murmurs, rubs or gallops. Abdomen: Soft, non-tender, non-distended with normal bowel sounds. Musculoskeletal: No clubbing, cyanosis or edema bilaterally. Full range of motion without deformity. Skin: Skin color, texture, turgor normal.  No rashes or lesions. Neurologic:  Neurovascularly intact without any focal sensory/motor deficits.  Cranial nerves: II-XII intact, grossly non-focal.  Psychiatric: Alert and oriented, thought content appropriate, normal insight  Capillary Refill: Brisk,< 3 seconds   Peripheral Pulses: +2 palpable, equal bilaterally       Labs:   Recent Labs     05/01/22  0624 05/02/22  0640 05/03/22  0738   WBC 12.7* 8.9 9.3   HGB 12.8* 12.7* 12.4*   HCT 38.7* 38.1* 37.5*    233 224     Recent Labs     05/01/22  0624 05/02/22  0640    140   K 4.7 4.9    101   CO2 34* 32   BUN 54* 53*   CREATININE 1.4* 1.4*   CALCIUM 10.2 10.1   PHOS 3.2 2.9     No results for input(s): AST, ALT, BILIDIR, BILITOT, ALKPHOS in the last 72 hours. No results for input(s): INR in the last 72 hours. No results for input(s): Reyne [place] in the last 72 hours. Urinalysis:      Lab Results   Component Value Date    NITRU Negative 04/14/2021    WBCUA 3-5 04/14/2021    BACTERIA Rare 04/14/2021    RBCUA 3-4 04/14/2021    BLOODU Negative 04/14/2021    SPECGRAV >=1.030 04/14/2021    GLUCOSEU Negative 04/14/2021    GLUCOSEU NEGATIVE 03/27/2012       Consults:    IP CONSULT TO PULMONOLOGY      Assessment/Plan:    Active Hospital Problems    Diagnosis     Chest congestion [R09.89]      Priority: Medium    Mucus plugging of bronchi [T17.500A]      Priority: Medium    Ineffective airway clearance [R06.89]      Priority: Medium    CKD (chronic kidney disease) [N18.9]      Priority: Medium    COPD with acute exacerbation ([facility] [address] [J44.1]      Priority: Medium    Acute on chronic respiratory failure (HCC) [J96.20]      Priority: Medium    Essential hypertension [I10]        COPD - w/ chronic respiratory failure on baseline home O2 at 1-2 LPM.  Normally controlled on home medication regimen - continued. Acute COPD exacerbation in setting of chronic respiratory failure - base line O2 1-2L. Started IV Solumedrol with transition to PO Prednisone 1 May. Doxycycline started 28 April - continued. Continue HHN.  Pulmonology consulted and appreciated - S/P Bronchoscopy 2 May w/ aspiration of mucous plugging.      Acute on Chronic Respiratory Failure - w/ progressive hypoxia, POArrival.  Presence of clinical respiratory distress w/ tachypnea/dyspnea/SOB and wheezing w/ use of accessory muscles to breath. Supplemental O2 and wean as tolerated. Nocturnal Bipap as ordered by Pulmonology - consulted and appreciated. Hx PE - anticoagulated at baseline on home Xarelto - continued.     Tobacco Abuse - active and ongoing. Cessation counseled. Nicotine replacement PRN. CKD - baseline stage 3. Follow serial labs - stable. Reviewed and documented as above.       DVT Prophylaxis: Xarelto     Recent Labs     05/01/22  0624 05/02/22  0640 05/03/22  0738    233 224     Diet: ADULT DIET; Dysphagia - Minced and Moist  ADULT ORAL NUTRITION SUPPLEMENT; Breakfast, Lunch, Dinner; Standard High Calorie/High Protein Oral Supplement  Code Status: Full Code      PT/OT Eval Status: seen w/ recs for home w/ assist.     Dispo - Patient is likely to remain in-house at least until Tues/Wed 3/4 May pending clinical course and subspecialty recs - [facility] through [facility] if needed. [employer].        Kay Pinto MD

## 2022-05-04 LAB
CULTURE, RESPIRATORY: ABNORMAL
CULTURE, RESPIRATORY: ABNORMAL
GRAM STAIN RESULT: ABNORMAL
ORGANISM: ABNORMAL

## 2022-05-05 NOTE — DISCHARGE SUMMARY
Hospital Medicine Discharge Summary    Patient ID: Sj Ireland      Patient's PCP: Josh Grover MD    Admit Date: 4/28/2022     Discharge Date: 5/3/2022      Admitting Physician: Casimer Fabry, MD     Discharge Physician: Casimer Fabry, MD     Discharge Diagnoses: Active Hospital Problems    Diagnosis     Chest congestion [R09.89]      Priority: Medium    Mucus plugging of bronchi [T17.500A]      Priority: Medium    Ineffective airway clearance [R06.89]      Priority: Medium    CKD (chronic kidney disease) [N18.9]      Priority: Medium    COPD with acute exacerbation ([facility] [address] [J44.1]      Priority: Medium    Acute on chronic respiratory failure (HCC) [J96.20]      Priority: Medium    Essential hypertension [I10]        The patient was seen and examined on day of discharge and this discharge summary is in conjunction with any daily progress note from day of discharge. Hospital Course:       COPD - w/ chronic respiratory failure on baseline home O2 at 1-2 LPM.  Normally controlled on home medication regimen - continued. Acute COPD exacerbation in setting of chronic respiratory failure - base line O2 1-2L. Started IV Solumedrol with transition to PO Prednisone 1 May. Doxycycline started 28 April - continued. Continue HHN. Pulmonology consulted and appreciated - S/P Bronchoscopy 2 May w/ aspiration of mucous plugging.      Acute on Chronic Respiratory Failure - w/ progressive hypoxia, POArrival.  Presence of clinical respiratory distress w/ tachypnea/dyspnea/SOB and wheezing w/ use of accessory muscles to breath. Supplemental O2 and wean as tolerated. Nocturnal Bipap as ordered by Pulmonology - consulted and appreciated.      Hx PE - anticoagulated at baseline on home Xarelto - continued.     Tobacco Abuse - active and ongoing. Cessation counseled. Nicotine replacement PRN.    CKD - baseline stage 3. Follow serial labs - stable.         Labs:  For convenience and continuity at follow-up the following most recent labs are provided:      CBC:    Lab Results   Component Value Date    WBC 9.3 05/03/2022    HGB 12.4 05/03/2022    HCT 37.5 05/03/2022     05/03/2022       Renal:    Lab Results   Component Value Date     05/03/2022    K 4.3 05/03/2022    K 4.8 04/29/2022     05/03/2022    CO2 33 05/03/2022    BUN 43 05/03/2022    CREATININE 1.2 05/03/2022    CALCIUM 9.8 05/03/2022    PHOS 2.7 05/03/2022         Significant Diagnostic Studies    Radiology:   FL MODIFIED BARIUM SWALLOW W VIDEO   Final Result   1. No evidence of significant penetration of larynx or aspiration as   described above. 2. Mildly prominent cricopharyngeus impression upon posterior cervical   esophagus. Please see separate Speech Pathology report for full discussion of findings   and recommendations. RECOMMENDATIONS:   Unavailable         XR CHEST PORTABLE   Final Result   Increased lung volumes, no acute process detected. Consults:     IP CONSULT TO PULMONOLOGY    Disposition: Home w/ C    Condition at Discharge: Stable    Discharge Instructions/Follow-up:  w/ PCP 1-2 weeks and subspecialists as arranged.      Code Status:  Full Code    Activity: activity as tolerated    Diet: regular diet      Discharge Medications:     Discharge Medication List as of 5/3/2022  2:38 PM           Details   pantoprazole (PROTONIX) 40 MG tablet Take 1 tablet by mouth every morning (before breakfast), Disp-30 tablet, R-3Print      benzonatate (TESSALON) 100 MG capsule Take 1 capsule by mouth 3 times daily as needed for Cough, Disp-20 capsule, R-0Print      predniSONE (DELTASONE) 20 MG tablet Take 2 tablets by mouth daily for 3 days, Disp-6 tablet, R-0Print              Details   aspirin EC 81 MG EC tablet Take 81 mg by mouth dailyHistorical Med      fluticasone (FLONASE) 50 MCG/ACT nasal spray 2 sprays by Each Nostril route dailyHistorical Med      tiotropium-olodaterol (STIOLTO) 2.5-2.5 MCG/ACT AERS Inhale 2 puffs into the lungs dailyHistorical Med      albuterol sulfate (PROAIR RESPICLICK) 786 (90 Base) MCG/ACT aerosol powder inhalation Inhale 2 puffs into the lungs every 4 hours as needed for Wheezing or Shortness of BreathHistorical Med      rivaroxaban (XARELTO) 20 MG TABS tablet Take 20 mg by mouth Daily with supperHistorical Med      metoprolol tartrate (LOPRESSOR) 25 MG tablet Take 12.5 mg by mouth 2 times daily Take 12.5 mg BIDHistorical Med      atorvastatin (LIPITOR) 80 MG tablet Take 80 mg by mouth nightlyHistorical Med      !! Compression Stockings MISC Starting 10/13/2014, Until Discontinued, Disp-1 each, R-0, Print      !! Compression Stockings MISC Starting 2/11/2014, Until Discontinued, Disp-2 each, R-0, Print       !! - Potential duplicate medications found. Please discuss with provider. Time Spent on discharge is more than 30 minutes in the examination, evaluation, counseling and review of medications and discharge plan. Signed:    Lance Mcmanus MD   5/5/2022      Thank you Gregory Rice MD for the opportunity to be involved in this patient's care. If you have any questions or concerns please feel free to contact me at [phone].

## (undated) DEVICE — CANNULA,OXY,ADULT,SUPERSOFT,W/7'TUB,SC: Brand: MEDLINE INDUSTRIES, INC.

## (undated) DEVICE — ELECTRODE,RADIOTRANSLUCENT,FOAM,3PK: Brand: MEDLINE

## (undated) DEVICE — SINGLE USE SUCTION VALVE MAJ-209: Brand: SINGLE USE SUCTION VALVE (STERILE)

## (undated) DEVICE — YANKAUER,BULB TIP,W/O VENT,RIGID,STERILE: Brand: MEDLINE

## (undated) DEVICE — CONMED SCOPE SAVER BITE BLOCK, 20X27 MM: Brand: SCOPE SAVER

## (undated) DEVICE — SINGLE USE BIOPSY VALVE MAJ-210: Brand: SINGLE USE BIOPSY VALVE (STERILE)

## (undated) DEVICE — TRAP,MUCUS SPECIMEN, 80CC: Brand: MEDLINE

## (undated) DEVICE — SYRINGE MED 10ML SLIP TIP BLNT FILL AND LUERLOCK DISP

## (undated) DEVICE — TUBING, SUCTION, 3/16" X 12', STRAIGHT: Brand: MEDLINE

## (undated) DEVICE — BOWL MED M 16OZ PLAS CAP GRAD

## (undated) DEVICE — RETRIEVER ENDOSCP L230CM DIA2.5MM NET W3XL5.5CM MIN WRK CHN

## (undated) DEVICE — DISPOSABLE BIOPSY FORCEPS: Brand: DISPOSABLE BIOPSY FORCEPS

## (undated) DEVICE — SYRINGE MED 50ML LUERSLIP TIP

## (undated) DEVICE — TUBING, SUCTION, 3/16" X 6', STRAIGHT: Brand: MEDLINE